# Patient Record
Sex: FEMALE | Race: WHITE | Employment: OTHER | URBAN - METROPOLITAN AREA
[De-identification: names, ages, dates, MRNs, and addresses within clinical notes are randomized per-mention and may not be internally consistent; named-entity substitution may affect disease eponyms.]

---

## 2017-01-01 ENCOUNTER — HOSPITAL ENCOUNTER (INPATIENT)
Age: 82
LOS: 3 days | DRG: 683 | End: 2017-12-02
Attending: EMERGENCY MEDICINE | Admitting: HOSPITALIST
Payer: MEDICARE

## 2017-01-01 ENCOUNTER — APPOINTMENT (OUTPATIENT)
Dept: GENERAL RADIOLOGY | Age: 82
DRG: 603 | End: 2017-01-01
Attending: EMERGENCY MEDICINE
Payer: MEDICARE

## 2017-01-01 ENCOUNTER — HOSPITAL ENCOUNTER (OUTPATIENT)
Dept: PHYSICAL THERAPY | Age: 82
Discharge: HOME | End: 2017-06-20
Payer: MEDICARE

## 2017-01-01 ENCOUNTER — APPOINTMENT (OUTPATIENT)
Dept: CT IMAGING | Age: 82
End: 2017-01-01
Attending: EMERGENCY MEDICINE
Payer: MEDICARE

## 2017-01-01 ENCOUNTER — APPOINTMENT (OUTPATIENT)
Dept: ULTRASOUND IMAGING | Age: 82
DRG: 683 | End: 2017-01-01
Attending: HOSPITALIST
Payer: MEDICARE

## 2017-01-01 ENCOUNTER — HOSPITAL ENCOUNTER (OUTPATIENT)
Dept: NON INVASIVE DIAGNOSTICS | Age: 82
Discharge: HOME | End: 2017-02-08
Payer: MEDICARE

## 2017-01-01 ENCOUNTER — APPOINTMENT (OUTPATIENT)
Dept: PHYSICAL THERAPY | Age: 82
End: 2017-01-01
Payer: MEDICARE

## 2017-01-01 ENCOUNTER — HOSPITAL ENCOUNTER (OUTPATIENT)
Dept: PHYSICAL THERAPY | Age: 82
End: 2017-01-01
Payer: MEDICARE

## 2017-01-01 ENCOUNTER — HOSPITAL ENCOUNTER (OUTPATIENT)
Dept: PHYSICAL THERAPY | Age: 82
Discharge: HOME | End: 2017-06-28
Payer: MEDICARE

## 2017-01-01 ENCOUNTER — HOSPITAL ENCOUNTER (OUTPATIENT)
Dept: PHYSICAL THERAPY | Age: 82
Discharge: HOME | End: 2017-07-27
Payer: MEDICARE

## 2017-01-01 ENCOUNTER — HOSPITAL ENCOUNTER (OUTPATIENT)
Dept: PHYSICAL THERAPY | Age: 82
Discharge: HOME | End: 2017-06-30
Payer: MEDICARE

## 2017-01-01 ENCOUNTER — HOSPITAL ENCOUNTER (OUTPATIENT)
Dept: PHYSICAL THERAPY | Age: 82
Discharge: HOME | End: 2017-07-05
Payer: MEDICARE

## 2017-01-01 ENCOUNTER — APPOINTMENT (OUTPATIENT)
Dept: GENERAL RADIOLOGY | Age: 82
End: 2017-01-01
Attending: EMERGENCY MEDICINE
Payer: MEDICARE

## 2017-01-01 ENCOUNTER — HOSPITAL ENCOUNTER (EMERGENCY)
Age: 82
Discharge: HOME | End: 2017-07-09
Attending: EMERGENCY MEDICINE
Payer: MEDICARE

## 2017-01-01 ENCOUNTER — HOSPITAL ENCOUNTER (OUTPATIENT)
Dept: WOUND CARE | Age: 82
Discharge: HOME | End: 2017-01-04
Payer: MEDICARE

## 2017-01-01 ENCOUNTER — HOSPITAL ENCOUNTER (INPATIENT)
Age: 82
LOS: 2 days | Discharge: HOME | DRG: 603 | End: 2017-05-04
Attending: EMERGENCY MEDICINE | Admitting: HOSPITALIST
Payer: MEDICARE

## 2017-01-01 ENCOUNTER — HOSPITAL ENCOUNTER (OUTPATIENT)
Dept: PHYSICAL THERAPY | Age: 82
Discharge: HOME | End: 2017-06-22
Payer: MEDICARE

## 2017-01-01 ENCOUNTER — HOSPITAL ENCOUNTER (OUTPATIENT)
Dept: WOUND CARE | Age: 82
Discharge: HOME | End: 2017-01-25
Payer: MEDICARE

## 2017-01-01 ENCOUNTER — HOSPITAL ENCOUNTER (OUTPATIENT)
Dept: PHYSICAL THERAPY | Age: 82
Discharge: HOME | End: 2017-05-30
Payer: MEDICARE

## 2017-01-01 ENCOUNTER — HOSPITAL ENCOUNTER (OUTPATIENT)
Dept: PHYSICAL THERAPY | Age: 82
Discharge: HOME | End: 2017-06-14
Payer: MEDICARE

## 2017-01-01 ENCOUNTER — HOSPITAL ENCOUNTER (EMERGENCY)
Age: 82
Discharge: HOME | End: 2017-09-26
Attending: EMERGENCY MEDICINE
Payer: MEDICARE

## 2017-01-01 ENCOUNTER — HOSPITAL ENCOUNTER (OUTPATIENT)
Dept: PHYSICAL THERAPY | Age: 82
Discharge: HOME | End: 2017-06-06
Payer: MEDICARE

## 2017-01-01 ENCOUNTER — HOSPITAL ENCOUNTER (OUTPATIENT)
Dept: PHYSICAL THERAPY | Age: 82
Discharge: HOME | End: 2017-06-12
Payer: MEDICARE

## 2017-01-01 VITALS
OXYGEN SATURATION: 98 % | DIASTOLIC BLOOD PRESSURE: 50 MMHG | BODY MASS INDEX: 28.68 KG/M2 | TEMPERATURE: 98 F | SYSTOLIC BLOOD PRESSURE: 151 MMHG | RESPIRATION RATE: 16 BRPM | HEART RATE: 56 BPM | WEIGHT: 142 LBS

## 2017-01-01 VITALS
HEIGHT: 59 IN | TEMPERATURE: 97.8 F | WEIGHT: 145 LBS | OXYGEN SATURATION: 97 % | BODY MASS INDEX: 29.23 KG/M2 | DIASTOLIC BLOOD PRESSURE: 72 MMHG | SYSTOLIC BLOOD PRESSURE: 144 MMHG | HEART RATE: 62 BPM | RESPIRATION RATE: 16 BRPM

## 2017-01-01 VITALS
DIASTOLIC BLOOD PRESSURE: 73 MMHG | TEMPERATURE: 97.1 F | OXYGEN SATURATION: 100 % | SYSTOLIC BLOOD PRESSURE: 137 MMHG | RESPIRATION RATE: 16 BRPM | HEART RATE: 61 BPM

## 2017-01-01 VITALS
SYSTOLIC BLOOD PRESSURE: 143 MMHG | RESPIRATION RATE: 16 BRPM | HEART RATE: 64 BPM | DIASTOLIC BLOOD PRESSURE: 80 MMHG | TEMPERATURE: 97.4 F | OXYGEN SATURATION: 96 %

## 2017-01-01 VITALS
TEMPERATURE: 99.3 F | DIASTOLIC BLOOD PRESSURE: 76 MMHG | RESPIRATION RATE: 16 BRPM | WEIGHT: 132 LBS | BODY MASS INDEX: 26.61 KG/M2 | OXYGEN SATURATION: 99 % | HEART RATE: 90 BPM | HEIGHT: 59 IN | SYSTOLIC BLOOD PRESSURE: 118 MMHG

## 2017-01-01 VITALS
WEIGHT: 177 LBS | DIASTOLIC BLOOD PRESSURE: 27 MMHG | HEART RATE: 43 BPM | TEMPERATURE: 95.6 F | HEIGHT: 60 IN | OXYGEN SATURATION: 89 % | BODY MASS INDEX: 34.75 KG/M2 | RESPIRATION RATE: 13 BRPM | SYSTOLIC BLOOD PRESSURE: 54 MMHG

## 2017-01-01 DIAGNOSIS — R31.9 URINARY TRACT INFECTION WITH HEMATURIA, SITE UNSPECIFIED: ICD-10-CM

## 2017-01-01 DIAGNOSIS — L03.115 CELLULITIS OF RIGHT LEG: Primary | ICD-10-CM

## 2017-01-01 DIAGNOSIS — N39.0 URINARY TRACT INFECTION WITH HEMATURIA, SITE UNSPECIFIED: ICD-10-CM

## 2017-01-01 DIAGNOSIS — J90 PLEURAL EFFUSION: ICD-10-CM

## 2017-01-01 DIAGNOSIS — R19.7 DIARRHEA, UNSPECIFIED TYPE: ICD-10-CM

## 2017-01-01 DIAGNOSIS — R60.0 LEG EDEMA: ICD-10-CM

## 2017-01-01 DIAGNOSIS — N39.0 URINARY TRACT INFECTION WITHOUT HEMATURIA, SITE UNSPECIFIED: Primary | ICD-10-CM

## 2017-01-01 DIAGNOSIS — I87.2 VENOUS INSUFFICIENCY: ICD-10-CM

## 2017-01-01 DIAGNOSIS — E86.0 DEHYDRATION: ICD-10-CM

## 2017-01-01 DIAGNOSIS — D68.9 COAGULOPATHY (HCC): ICD-10-CM

## 2017-01-01 DIAGNOSIS — I73.9 PERIPHERAL VASCULAR DISEASE (HCC): ICD-10-CM

## 2017-01-01 DIAGNOSIS — L03.114 CELLULITIS OF LEFT HAND: Primary | ICD-10-CM

## 2017-01-01 DIAGNOSIS — N17.9 AKI (ACUTE KIDNEY INJURY) (HCC): Primary | ICD-10-CM

## 2017-01-01 DIAGNOSIS — M25.461 KNEE EFFUSION, RIGHT: ICD-10-CM

## 2017-01-01 LAB
ALBUMIN SERPL-MCNC: 3.1 G/DL (ref 3.4–5)
ALBUMIN SERPL-MCNC: 3.1 G/DL (ref 3.4–5)
ALBUMIN SERPL-MCNC: 3.2 G/DL (ref 3.4–5)
ALBUMIN SERPL-MCNC: 3.5 G/DL (ref 3.4–5)
ALBUMIN/GLOB SERPL: 1.2 {RATIO} (ref 0.8–1.7)
ALBUMIN/GLOB SERPL: 1.3 {RATIO} (ref 0.8–1.7)
ALP SERPL-CCNC: 172 U/L (ref 45–117)
ALP SERPL-CCNC: 172 U/L (ref 45–117)
ALT SERPL-CCNC: 25 U/L (ref 13–56)
ALT SERPL-CCNC: 29 U/L (ref 13–56)
ANION GAP BLD CALC-SCNC: 11 MMOL/L (ref 3–18)
ANION GAP BLD CALC-SCNC: 7 MMOL/L (ref 3–18)
ANION GAP BLD CALC-SCNC: 8 MMOL/L (ref 3–18)
ANION GAP BLD CALC-SCNC: 9 MMOL/L (ref 3–18)
ANION GAP BLD CALC-SCNC: 9 MMOL/L (ref 3–18)
ANION GAP SERPL CALC-SCNC: 10 MMOL/L (ref 3–18)
ANION GAP SERPL CALC-SCNC: 11 MMOL/L (ref 3–18)
ANION GAP SERPL CALC-SCNC: 13 MMOL/L (ref 3–18)
ANION GAP SERPL CALC-SCNC: 14 MMOL/L (ref 3–18)
ANION GAP SERPL CALC-SCNC: 15 MMOL/L (ref 3–18)
APPEARANCE FLD: ABNORMAL
APPEARANCE UR: ABNORMAL
APPEARANCE UR: CLEAR
APTT PPP: 60.5 SEC (ref 23–36.4)
AST SERPL-CCNC: 36 U/L (ref 15–37)
AST SERPL-CCNC: 46 U/L (ref 15–37)
ATRIAL RATE: 15 BPM
ATRIAL RATE: 227 BPM
ATRIAL RATE: 48 BPM
BACTERIA SPEC CULT: ABNORMAL
BACTERIA SPEC CULT: ABNORMAL
BACTERIA SPEC CULT: NORMAL
BACTERIA URNS QL MICRO: ABNORMAL /HPF
BACTERIA URNS QL MICRO: ABNORMAL /HPF
BASOPHILS # BLD AUTO: 0 K/UL (ref 0–0.06)
BASOPHILS # BLD: 0 % (ref 0–2)
BASOPHILS # BLD: 0 K/UL (ref 0–0.06)
BASOPHILS # BLD: 0 K/UL (ref 0–0.06)
BASOPHILS # BLD: 1 % (ref 0–2)
BASOPHILS NFR BLD: 0 % (ref 0–2)
BASOPHILS NFR BLD: 1 % (ref 0–2)
BILIRUB DIRECT SERPL-MCNC: 0.2 MG/DL (ref 0–0.2)
BILIRUB SERPL-MCNC: 0.3 MG/DL (ref 0.2–1)
BILIRUB SERPL-MCNC: 0.9 MG/DL (ref 0.2–1)
BILIRUB UR QL: NEGATIVE
BILIRUB UR QL: NEGATIVE
BUN SERPL-MCNC: 122 MG/DL (ref 7–18)
BUN SERPL-MCNC: 122 MG/DL (ref 7–18)
BUN SERPL-MCNC: 129 MG/DL (ref 7–18)
BUN SERPL-MCNC: 132 MG/DL (ref 7–18)
BUN SERPL-MCNC: 27 MG/DL (ref 7–18)
BUN SERPL-MCNC: 48 MG/DL (ref 7–18)
BUN SERPL-MCNC: 49 MG/DL (ref 7–18)
BUN SERPL-MCNC: 53 MG/DL (ref 7–18)
BUN SERPL-MCNC: 55 MG/DL (ref 7–18)
BUN SERPL-MCNC: 66 MG/DL (ref 7–18)
BUN/CREAT SERPL: 19 (ref 12–20)
BUN/CREAT SERPL: 30 (ref 12–20)
BUN/CREAT SERPL: 30 (ref 12–20)
BUN/CREAT SERPL: 31 (ref 12–20)
BUN/CREAT SERPL: 31 (ref 12–20)
BUN/CREAT SERPL: 33 (ref 12–20)
BUN/CREAT SERPL: 34 (ref 12–20)
BUN/CREAT SERPL: 38 (ref 12–20)
BUN/CREAT SERPL: 38 (ref 12–20)
BUN/CREAT SERPL: 42 (ref 12–20)
CALCIUM SERPL-MCNC: 8.6 MG/DL (ref 8.5–10.1)
CALCIUM SERPL-MCNC: 8.6 MG/DL (ref 8.5–10.1)
CALCIUM SERPL-MCNC: 8.7 MG/DL (ref 8.5–10.1)
CALCIUM SERPL-MCNC: 8.8 MG/DL (ref 8.5–10.1)
CALCIUM SERPL-MCNC: 8.9 MG/DL (ref 8.5–10.1)
CALCIUM SERPL-MCNC: 9.1 MG/DL (ref 8.5–10.1)
CALCIUM SERPL-MCNC: 9.5 MG/DL (ref 8.5–10.1)
CALCIUM SERPL-MCNC: 9.6 MG/DL (ref 8.5–10.1)
CALCULATED R AXIS, ECG10: -20 DEGREES
CALCULATED R AXIS, ECG10: -37 DEGREES
CALCULATED R AXIS, ECG10: -38 DEGREES
CALCULATED T AXIS, ECG11: -33 DEGREES
CALCULATED T AXIS, ECG11: 1 DEGREES
CALCULATED T AXIS, ECG11: 26 DEGREES
CHLORIDE SERPL-SCNC: 102 MMOL/L (ref 100–108)
CHLORIDE SERPL-SCNC: 103 MMOL/L (ref 100–108)
CHLORIDE SERPL-SCNC: 104 MMOL/L (ref 100–108)
CHLORIDE SERPL-SCNC: 105 MMOL/L (ref 100–108)
CHLORIDE SERPL-SCNC: 106 MMOL/L (ref 100–108)
CHLORIDE SERPL-SCNC: 107 MMOL/L (ref 100–108)
CK MB CFR SERPL CALC: 5.9 % (ref 0–4)
CK MB CFR SERPL CALC: ABNORMAL % (ref 0–4)
CK MB SERPL-MCNC: 3.9 NG/ML (ref 5–25)
CK MB SERPL-MCNC: <1 NG/ML (ref 5–25)
CK SERPL-CCNC: 24 U/L (ref 26–192)
CK SERPL-CCNC: 66 U/L (ref 26–192)
CO2 SERPL-SCNC: 16 MMOL/L (ref 21–32)
CO2 SERPL-SCNC: 17 MMOL/L (ref 21–32)
CO2 SERPL-SCNC: 20 MMOL/L (ref 21–32)
CO2 SERPL-SCNC: 21 MMOL/L (ref 21–32)
CO2 SERPL-SCNC: 24 MMOL/L (ref 21–32)
CO2 SERPL-SCNC: 25 MMOL/L (ref 21–32)
CO2 SERPL-SCNC: 25 MMOL/L (ref 21–32)
CO2 SERPL-SCNC: 26 MMOL/L (ref 21–32)
CO2 SERPL-SCNC: 26 MMOL/L (ref 21–32)
CO2 SERPL-SCNC: 28 MMOL/L (ref 21–32)
COLOR FLD: ABNORMAL
COLOR UR: YELLOW
COLOR UR: YELLOW
CREAT SERPL-MCNC: 1.16 MG/DL (ref 0.6–1.3)
CREAT SERPL-MCNC: 1.26 MG/DL (ref 0.6–1.3)
CREAT SERPL-MCNC: 1.4 MG/DL (ref 0.6–1.3)
CREAT SERPL-MCNC: 1.45 MG/DL (ref 0.6–1.3)
CREAT SERPL-MCNC: 1.76 MG/DL (ref 0.6–1.3)
CREAT SERPL-MCNC: 1.92 MG/DL (ref 0.6–1.3)
CREAT SERPL-MCNC: 4.02 MG/DL (ref 0.6–1.3)
CREAT SERPL-MCNC: 4.02 MG/DL (ref 0.6–1.3)
CREAT SERPL-MCNC: 4.11 MG/DL (ref 0.6–1.3)
CREAT SERPL-MCNC: 4.16 MG/DL (ref 0.6–1.3)
CREAT UR-MCNC: 101 MG/DL (ref 30–125)
CREAT UR-MCNC: 101 MG/DL (ref 30–125)
DIAGNOSIS, 93000: NORMAL
DIFFERENTIAL METHOD BLD: ABNORMAL
DIGOXIN SERPL-MCNC: 0.5 NG/ML (ref 0.9–2)
EOSINOPHIL # BLD: 0.1 K/UL (ref 0–0.4)
EOSINOPHIL # BLD: 0.1 K/UL (ref 0–0.4)
EOSINOPHIL # BLD: 0.2 K/UL (ref 0–0.4)
EOSINOPHIL # BLD: 0.2 K/UL (ref 0–0.4)
EOSINOPHIL # BLD: 0.4 K/UL (ref 0–0.4)
EOSINOPHIL # BLD: 0.5 K/UL (ref 0–0.4)
EOSINOPHIL # FLD: 0 %
EOSINOPHIL NFR BLD: 1 % (ref 0–5)
EOSINOPHIL NFR BLD: 1 % (ref 0–5)
EOSINOPHIL NFR BLD: 13 % (ref 0–5)
EOSINOPHIL NFR BLD: 14 % (ref 0–5)
EOSINOPHIL NFR BLD: 2 % (ref 0–5)
EOSINOPHIL NFR BLD: 2 % (ref 0–5)
EPITH CASTS URNS QL MICRO: ABNORMAL /LPF (ref 0–5)
EPITH CASTS URNS QL MICRO: ABNORMAL /LPF (ref 0–5)
ERYTHROCYTE [DISTWIDTH] IN BLOOD BY AUTOMATED COUNT: 14.8 % (ref 11.6–14.5)
ERYTHROCYTE [DISTWIDTH] IN BLOOD BY AUTOMATED COUNT: 16 % (ref 11.6–14.5)
ERYTHROCYTE [DISTWIDTH] IN BLOOD BY AUTOMATED COUNT: 16.5 % (ref 11.6–14.5)
ERYTHROCYTE [DISTWIDTH] IN BLOOD BY AUTOMATED COUNT: 16.7 % (ref 11.6–14.5)
ERYTHROCYTE [DISTWIDTH] IN BLOOD BY AUTOMATED COUNT: 16.7 % (ref 11.6–14.5)
ERYTHROCYTE [DISTWIDTH] IN BLOOD BY AUTOMATED COUNT: 19.9 % (ref 11.6–14.5)
ERYTHROCYTE [DISTWIDTH] IN BLOOD BY AUTOMATED COUNT: 20.5 % (ref 11.6–14.5)
GLOBULIN SER CALC-MCNC: 2.6 G/DL (ref 2–4)
GLOBULIN SER CALC-MCNC: 2.7 G/DL (ref 2–4)
GLUCOSE SERPL-MCNC: 106 MG/DL (ref 74–99)
GLUCOSE SERPL-MCNC: 107 MG/DL (ref 74–99)
GLUCOSE SERPL-MCNC: 109 MG/DL (ref 74–99)
GLUCOSE SERPL-MCNC: 116 MG/DL (ref 74–99)
GLUCOSE SERPL-MCNC: 123 MG/DL (ref 74–99)
GLUCOSE SERPL-MCNC: 67 MG/DL (ref 74–99)
GLUCOSE SERPL-MCNC: 68 MG/DL (ref 74–99)
GLUCOSE SERPL-MCNC: 95 MG/DL (ref 74–99)
GLUCOSE SERPL-MCNC: 95 MG/DL (ref 74–99)
GLUCOSE SERPL-MCNC: 98 MG/DL (ref 74–99)
GLUCOSE UR STRIP.AUTO-MCNC: NEGATIVE MG/DL
GLUCOSE UR STRIP.AUTO-MCNC: NEGATIVE MG/DL
GRAM STN SPEC: NORMAL
GRAM STN SPEC: NORMAL
HCT VFR BLD AUTO: 30 % (ref 35–45)
HCT VFR BLD AUTO: 31.7 % (ref 35–45)
HCT VFR BLD AUTO: 33.4 % (ref 35–45)
HCT VFR BLD AUTO: 34 % (ref 35–45)
HCT VFR BLD AUTO: 34.3 % (ref 35–45)
HCT VFR BLD AUTO: 35 % (ref 35–45)
HCT VFR BLD AUTO: 36.8 % (ref 35–45)
HGB BLD-MCNC: 10.6 G/DL (ref 12–16)
HGB BLD-MCNC: 11.1 G/DL (ref 12–16)
HGB BLD-MCNC: 11.4 G/DL (ref 12–16)
HGB BLD-MCNC: 11.6 G/DL (ref 12–16)
HGB BLD-MCNC: 11.6 G/DL (ref 12–16)
HGB BLD-MCNC: 12.3 G/DL (ref 12–16)
HGB BLD-MCNC: 9.9 G/DL (ref 12–16)
HGB UR QL STRIP: ABNORMAL
HGB UR QL STRIP: ABNORMAL
INR PPP: 2.9 (ref 0.8–1.2)
INR PPP: 4.1 (ref 0.8–1.2)
KETONES UR QL STRIP.AUTO: NEGATIVE MG/DL
KETONES UR QL STRIP.AUTO: NEGATIVE MG/DL
LACTATE BLD-SCNC: 1.2 MMOL/L (ref 0.4–2)
LACTATE BLD-SCNC: 1.4 MMOL/L (ref 0.4–2)
LEUKOCYTE ESTERASE UR QL STRIP.AUTO: ABNORMAL
LEUKOCYTE ESTERASE UR QL STRIP.AUTO: ABNORMAL
LYMPHOCYTES # BLD AUTO: 11 % (ref 21–52)
LYMPHOCYTES # BLD AUTO: 3 % (ref 21–52)
LYMPHOCYTES # BLD AUTO: 4 % (ref 21–52)
LYMPHOCYTES # BLD AUTO: 5 % (ref 21–52)
LYMPHOCYTES # BLD: 0.4 K/UL (ref 0.9–3.6)
LYMPHOCYTES # BLD: 0.4 K/UL (ref 0.9–3.6)
LYMPHOCYTES # BLD: 0.5 K/UL (ref 0.9–3.6)
LYMPHOCYTES # BLD: 0.6 K/UL (ref 0.9–3.6)
LYMPHOCYTES NFR BLD: 12 % (ref 21–52)
LYMPHOCYTES NFR BLD: 15 % (ref 21–52)
LYMPHOCYTES NFR FLD: 50 %
MAGNESIUM SERPL-MCNC: 2.4 MG/DL (ref 1.6–2.6)
MCH RBC QN AUTO: 30.3 PG (ref 24–34)
MCH RBC QN AUTO: 30.4 PG (ref 24–34)
MCH RBC QN AUTO: 30.5 PG (ref 24–34)
MCH RBC QN AUTO: 30.6 PG (ref 24–34)
MCH RBC QN AUTO: 30.8 PG (ref 24–34)
MCH RBC QN AUTO: 30.8 PG (ref 24–34)
MCH RBC QN AUTO: 32 PG (ref 24–34)
MCHC RBC AUTO-ENTMCNC: 33 G/DL (ref 31–37)
MCHC RBC AUTO-ENTMCNC: 33.1 G/DL (ref 31–37)
MCHC RBC AUTO-ENTMCNC: 33.2 G/DL (ref 31–37)
MCHC RBC AUTO-ENTMCNC: 33.4 G/DL (ref 31–37)
MCHC RBC AUTO-ENTMCNC: 33.4 G/DL (ref 31–37)
MCHC RBC AUTO-ENTMCNC: 33.5 G/DL (ref 31–37)
MCHC RBC AUTO-ENTMCNC: 33.8 G/DL (ref 31–37)
MCV RBC AUTO: 90.5 FL (ref 74–97)
MCV RBC AUTO: 90.9 FL (ref 74–97)
MCV RBC AUTO: 91.6 FL (ref 74–97)
MCV RBC AUTO: 91.7 FL (ref 74–97)
MCV RBC AUTO: 92 FL (ref 74–97)
MCV RBC AUTO: 92.2 FL (ref 74–97)
MCV RBC AUTO: 96.3 FL (ref 74–97)
MONOCYTES # BLD: 0.2 K/UL (ref 0.05–1.2)
MONOCYTES # BLD: 0.3 K/UL (ref 0.05–1.2)
MONOCYTES # BLD: 0.4 K/UL (ref 0.05–1.2)
MONOCYTES # BLD: 0.9 K/UL (ref 0.05–1.2)
MONOCYTES NFR BLD AUTO: 16 % (ref 3–10)
MONOCYTES NFR BLD AUTO: 2 % (ref 3–10)
MONOCYTES NFR BLD AUTO: 2 % (ref 3–10)
MONOCYTES NFR BLD AUTO: 3 % (ref 3–10)
MONOCYTES NFR BLD: 11 % (ref 3–10)
MONOCYTES NFR BLD: 7 % (ref 3–10)
MONOCYTES NFR FLD: 10 %
NEUTS BAND # FLD: 10 %
NEUTS SEG # BLD: 1.9 K/UL (ref 1.8–8)
NEUTS SEG # BLD: 12.1 K/UL (ref 1.8–8)
NEUTS SEG # BLD: 2 K/UL (ref 1.8–8)
NEUTS SEG # BLD: 4.1 K/UL (ref 1.8–8)
NEUTS SEG # BLD: 9 K/UL (ref 1.8–8)
NEUTS SEG # BLD: 9.8 K/UL (ref 1.8–8)
NEUTS SEG NFR BLD AUTO: 70 % (ref 40–73)
NEUTS SEG NFR BLD AUTO: 90 % (ref 40–73)
NEUTS SEG NFR BLD AUTO: 93 % (ref 40–73)
NEUTS SEG NFR BLD AUTO: 94 % (ref 40–73)
NEUTS SEG NFR BLD: 59 % (ref 40–73)
NEUTS SEG NFR BLD: 68 % (ref 40–73)
NEUTS SEG NFR FLD: 30 %
NITRITE UR QL STRIP.AUTO: NEGATIVE
NITRITE UR QL STRIP.AUTO: NEGATIVE
NUC CELL # FLD: 3950 /CU MM
PH UR STRIP: 5 [PH] (ref 5–8)
PH UR STRIP: 5 [PH] (ref 5–8)
PHOSPHATE SERPL-MCNC: 8.3 MG/DL (ref 2.5–4.9)
PHOSPHATE SERPL-MCNC: 8.4 MG/DL (ref 2.5–4.9)
PLATELET # BLD AUTO: 102 K/UL (ref 135–420)
PLATELET # BLD AUTO: 119 K/UL (ref 135–420)
PLATELET # BLD AUTO: 121 K/UL (ref 135–420)
PLATELET # BLD AUTO: 135 K/UL (ref 135–420)
PLATELET # BLD AUTO: 72 K/UL (ref 135–420)
PLATELET # BLD AUTO: 95 K/UL (ref 135–420)
PLATELET # BLD AUTO: 98 K/UL (ref 135–420)
PMV BLD AUTO: 11.3 FL (ref 9.2–11.8)
PMV BLD AUTO: 11.3 FL (ref 9.2–11.8)
PMV BLD AUTO: 11.4 FL (ref 9.2–11.8)
PMV BLD AUTO: 11.4 FL (ref 9.2–11.8)
PMV BLD AUTO: 12.9 FL (ref 9.2–11.8)
PMV BLD AUTO: 13.4 FL (ref 9.2–11.8)
POTASSIUM SERPL-SCNC: 3.9 MMOL/L (ref 3.5–5.5)
POTASSIUM SERPL-SCNC: 4.1 MMOL/L (ref 3.5–5.5)
POTASSIUM SERPL-SCNC: 4.2 MMOL/L (ref 3.5–5.5)
POTASSIUM SERPL-SCNC: 4.3 MMOL/L (ref 3.5–5.5)
POTASSIUM SERPL-SCNC: 4.5 MMOL/L (ref 3.5–5.5)
POTASSIUM SERPL-SCNC: 4.6 MMOL/L (ref 3.5–5.5)
POTASSIUM SERPL-SCNC: 4.6 MMOL/L (ref 3.5–5.5)
POTASSIUM SERPL-SCNC: 5.1 MMOL/L (ref 3.5–5.5)
PROT SERPL-MCNC: 5.9 G/DL (ref 6.4–8.2)
PROT SERPL-MCNC: 6.1 G/DL (ref 6.4–8.2)
PROT UR STRIP-MCNC: 100 MG/DL
PROT UR STRIP-MCNC: NEGATIVE MG/DL
PROT UR-MCNC: 108 MG/DL
PROT/CREAT UR-RTO: 1.1
PROTHROMBIN TIME: 28.7 SEC (ref 11.5–15.2)
PROTHROMBIN TIME: 37.2 SEC (ref 11.5–15.2)
Q-T INTERVAL, ECG07: 432 MS
Q-T INTERVAL, ECG07: 490 MS
Q-T INTERVAL, ECG07: 642 MS
QRS DURATION, ECG06: 100 MS
QRS DURATION, ECG06: 118 MS
QRS DURATION, ECG06: 124 MS
QTC CALCULATION (BEZET), ECG08: 401 MS
QTC CALCULATION (BEZET), ECG08: 455 MS
QTC CALCULATION (BEZET), ECG08: 496 MS
RBC # BLD AUTO: 3.27 M/UL (ref 4.2–5.3)
RBC # BLD AUTO: 3.44 M/UL (ref 4.2–5.3)
RBC # BLD AUTO: 3.47 M/UL (ref 4.2–5.3)
RBC # BLD AUTO: 3.74 M/UL (ref 4.2–5.3)
RBC # BLD AUTO: 3.79 M/UL (ref 4.2–5.3)
RBC # BLD AUTO: 3.82 M/UL (ref 4.2–5.3)
RBC # BLD AUTO: 4 M/UL (ref 4.2–5.3)
RBC # FLD: ABNORMAL /CU MM
RBC #/AREA URNS HPF: 0 /HPF (ref 0–5)
RBC #/AREA URNS HPF: ABNORMAL /HPF (ref 0–5)
SERVICE CMNT-IMP: ABNORMAL
SERVICE CMNT-IMP: NORMAL
SODIUM SERPL-SCNC: 135 MMOL/L (ref 136–145)
SODIUM SERPL-SCNC: 137 MMOL/L (ref 136–145)
SODIUM SERPL-SCNC: 137 MMOL/L (ref 136–145)
SODIUM SERPL-SCNC: 138 MMOL/L (ref 136–145)
SODIUM SERPL-SCNC: 138 MMOL/L (ref 136–145)
SODIUM SERPL-SCNC: 139 MMOL/L (ref 136–145)
SODIUM SERPL-SCNC: 140 MMOL/L (ref 136–145)
SODIUM SERPL-SCNC: 141 MMOL/L (ref 136–145)
SODIUM SERPL-SCNC: 141 MMOL/L (ref 136–145)
SODIUM SERPL-SCNC: 142 MMOL/L (ref 136–145)
SODIUM UR-SCNC: 25 MMOL/L (ref 20–110)
SP GR UR REFRACTOMETRY: 1.02 (ref 1–1.03)
SP GR UR REFRACTOMETRY: 1.02 (ref 1–1.03)
SPECIMEN SOURCE FLD: ABNORMAL
T4 FREE SERPL-MCNC: 1 NG/DL (ref 0.7–1.5)
TROPONIN I SERPL-MCNC: 0.03 NG/ML (ref 0–0.04)
TROPONIN I SERPL-MCNC: 0.06 NG/ML (ref 0–0.04)
TROPONIN I SERPL-MCNC: 0.07 NG/ML (ref 0–0.04)
TSH SERPL DL<=0.05 MIU/L-ACNC: 9.98 UIU/ML (ref 0.36–3.74)
UROBILINOGEN UR QL STRIP.AUTO: 0.2 EU/DL (ref 0.2–1)
UROBILINOGEN UR QL STRIP.AUTO: 0.2 EU/DL (ref 0.2–1)
VENTRICULAR RATE, ECG03: 36 BPM
VENTRICULAR RATE, ECG03: 52 BPM
VENTRICULAR RATE, ECG03: 52 BPM
WBC # BLD AUTO: 10.6 K/UL (ref 4.6–13.2)
WBC # BLD AUTO: 12.9 K/UL (ref 4.6–13.2)
WBC # BLD AUTO: 2.9 K/UL (ref 4.6–13.2)
WBC # BLD AUTO: 3.3 K/UL (ref 4.6–13.2)
WBC # BLD AUTO: 5.3 K/UL (ref 4.6–13.2)
WBC # BLD AUTO: 5.8 K/UL (ref 4.6–13.2)
WBC # BLD AUTO: 9.9 K/UL (ref 4.6–13.2)
WBC URNS QL MICRO: ABNORMAL /HPF (ref 0–4)
WBC URNS QL MICRO: ABNORMAL /HPF (ref 0–4)

## 2017-01-01 PROCEDURE — 74011250637 HC RX REV CODE- 250/637: Performed by: HOSPITALIST

## 2017-01-01 PROCEDURE — 87086 URINE CULTURE/COLONY COUNT: CPT | Performed by: EMERGENCY MEDICINE

## 2017-01-01 PROCEDURE — 74011000258 HC RX REV CODE- 258: Performed by: INTERNAL MEDICINE

## 2017-01-01 PROCEDURE — 74011000258 HC RX REV CODE- 258: Performed by: EMERGENCY MEDICINE

## 2017-01-01 PROCEDURE — 74011000258 HC RX REV CODE- 258: Performed by: HOSPITALIST

## 2017-01-01 PROCEDURE — 74011000250 HC RX REV CODE- 250: Performed by: INTERNAL MEDICINE

## 2017-01-01 PROCEDURE — 74011250636 HC RX REV CODE- 250/636

## 2017-01-01 PROCEDURE — 99284 EMERGENCY DEPT VISIT MOD MDM: CPT

## 2017-01-01 PROCEDURE — 85610 PROTHROMBIN TIME: CPT | Performed by: HOSPITALIST

## 2017-01-01 PROCEDURE — 84484 ASSAY OF TROPONIN QUANT: CPT | Performed by: EMERGENCY MEDICINE

## 2017-01-01 PROCEDURE — 65610000006 HC RM INTENSIVE CARE

## 2017-01-01 PROCEDURE — 97530 THERAPEUTIC ACTIVITIES: CPT

## 2017-01-01 PROCEDURE — 74011250636 HC RX REV CODE- 250/636: Performed by: EMERGENCY MEDICINE

## 2017-01-01 PROCEDURE — G8980 MOBILITY D/C STATUS: HCPCS

## 2017-01-01 PROCEDURE — 87077 CULTURE AEROBIC IDENTIFY: CPT | Performed by: EMERGENCY MEDICINE

## 2017-01-01 PROCEDURE — 97110 THERAPEUTIC EXERCISES: CPT

## 2017-01-01 PROCEDURE — 80069 RENAL FUNCTION PANEL: CPT | Performed by: INTERNAL MEDICINE

## 2017-01-01 PROCEDURE — 74011250636 HC RX REV CODE- 250/636: Performed by: INTERNAL MEDICINE

## 2017-01-01 PROCEDURE — 65270000029 HC RM PRIVATE

## 2017-01-01 PROCEDURE — 36415 COLL VENOUS BLD VENIPUNCTURE: CPT | Performed by: INTERNAL MEDICINE

## 2017-01-01 PROCEDURE — 80048 BASIC METABOLIC PNL TOTAL CA: CPT | Performed by: HOSPITALIST

## 2017-01-01 PROCEDURE — 80048 BASIC METABOLIC PNL TOTAL CA: CPT | Performed by: EMERGENCY MEDICINE

## 2017-01-01 PROCEDURE — 83735 ASSAY OF MAGNESIUM: CPT | Performed by: EMERGENCY MEDICINE

## 2017-01-01 PROCEDURE — 89051 BODY FLUID CELL COUNT: CPT | Performed by: EMERGENCY MEDICINE

## 2017-01-01 PROCEDURE — 74011250636 HC RX REV CODE- 250/636: Performed by: HOSPITALIST

## 2017-01-01 PROCEDURE — 80053 COMPREHEN METABOLIC PANEL: CPT | Performed by: EMERGENCY MEDICINE

## 2017-01-01 PROCEDURE — 74011000250 HC RX REV CODE- 250: Performed by: EMERGENCY MEDICINE

## 2017-01-01 PROCEDURE — 51798 US URINE CAPACITY MEASURE: CPT

## 2017-01-01 PROCEDURE — 93005 ELECTROCARDIOGRAM TRACING: CPT

## 2017-01-01 PROCEDURE — 73560 X-RAY EXAM OF KNEE 1 OR 2: CPT

## 2017-01-01 PROCEDURE — 81001 URINALYSIS AUTO W/SCOPE: CPT | Performed by: EMERGENCY MEDICINE

## 2017-01-01 PROCEDURE — 93306 TTE W/DOPPLER COMPLETE: CPT

## 2017-01-01 PROCEDURE — G8979 MOBILITY GOAL STATUS: HCPCS

## 2017-01-01 PROCEDURE — G8978 MOBILITY CURRENT STATUS: HCPCS

## 2017-01-01 PROCEDURE — 97162 PT EVAL MOD COMPLEX 30 MIN: CPT

## 2017-01-01 PROCEDURE — 83605 ASSAY OF LACTIC ACID: CPT

## 2017-01-01 PROCEDURE — 85610 PROTHROMBIN TIME: CPT | Performed by: EMERGENCY MEDICINE

## 2017-01-01 PROCEDURE — 74011000250 HC RX REV CODE- 250: Performed by: HOSPITALIST

## 2017-01-01 PROCEDURE — 99218 HC RM OBSERVATION: CPT

## 2017-01-01 PROCEDURE — 70450 CT HEAD/BRAIN W/O DYE: CPT

## 2017-01-01 PROCEDURE — 77030020256 HC SOL INJ NACL 0.9%  500ML

## 2017-01-01 PROCEDURE — 77030019938 HC TBNG IV PCA ICUM -A

## 2017-01-01 PROCEDURE — 85025 COMPLETE CBC W/AUTO DIFF WBC: CPT | Performed by: EMERGENCY MEDICINE

## 2017-01-01 PROCEDURE — 85027 COMPLETE CBC AUTOMATED: CPT | Performed by: EMERGENCY MEDICINE

## 2017-01-01 PROCEDURE — 36415 COLL VENOUS BLD VENIPUNCTURE: CPT | Performed by: HOSPITALIST

## 2017-01-01 PROCEDURE — 93308 TTE F-UP OR LMTD: CPT

## 2017-01-01 PROCEDURE — 82570 ASSAY OF URINE CREATININE: CPT | Performed by: HOSPITALIST

## 2017-01-01 PROCEDURE — 93971 EXTREMITY STUDY: CPT

## 2017-01-01 PROCEDURE — 85025 COMPLETE CBC W/AUTO DIFF WBC: CPT | Performed by: INTERNAL MEDICINE

## 2017-01-01 PROCEDURE — 87040 BLOOD CULTURE FOR BACTERIA: CPT | Performed by: EMERGENCY MEDICINE

## 2017-01-01 PROCEDURE — 96361 HYDRATE IV INFUSION ADD-ON: CPT

## 2017-01-01 PROCEDURE — 85730 THROMBOPLASTIN TIME PARTIAL: CPT | Performed by: EMERGENCY MEDICINE

## 2017-01-01 PROCEDURE — 74011250636 HC RX REV CODE- 250/636: Performed by: PHYSICIAN ASSISTANT

## 2017-01-01 PROCEDURE — 97166 OT EVAL MOD COMPLEX 45 MIN: CPT

## 2017-01-01 PROCEDURE — 87070 CULTURE OTHR SPECIMN AEROBIC: CPT | Performed by: EMERGENCY MEDICINE

## 2017-01-01 PROCEDURE — 96365 THER/PROPH/DIAG IV INF INIT: CPT

## 2017-01-01 PROCEDURE — 74011250637 HC RX REV CODE- 250/637: Performed by: EMERGENCY MEDICINE

## 2017-01-01 PROCEDURE — 71010 XR CHEST PORT: CPT

## 2017-01-01 PROCEDURE — 85025 COMPLETE CBC W/AUTO DIFF WBC: CPT | Performed by: HOSPITALIST

## 2017-01-01 PROCEDURE — 77030011256 HC DRSG MEPILEX <16IN NO BORD MOLN -A

## 2017-01-01 PROCEDURE — 77030034849

## 2017-01-01 PROCEDURE — 80076 HEPATIC FUNCTION PANEL: CPT | Performed by: HOSPITALIST

## 2017-01-01 PROCEDURE — 99211 OFF/OP EST MAY X REQ PHY/QHP: CPT

## 2017-01-01 PROCEDURE — 80162 ASSAY OF DIGOXIN TOTAL: CPT | Performed by: INTERNAL MEDICINE

## 2017-01-01 PROCEDURE — 84439 ASSAY OF FREE THYROXINE: CPT | Performed by: HOSPITALIST

## 2017-01-01 PROCEDURE — 84300 ASSAY OF URINE SODIUM: CPT | Performed by: INTERNAL MEDICINE

## 2017-01-01 PROCEDURE — 84156 ASSAY OF PROTEIN URINE: CPT | Performed by: INTERNAL MEDICINE

## 2017-01-01 PROCEDURE — 99283 EMERGENCY DEPT VISIT LOW MDM: CPT

## 2017-01-01 PROCEDURE — 96374 THER/PROPH/DIAG INJ IV PUSH: CPT

## 2017-01-01 PROCEDURE — 84443 ASSAY THYROID STIM HORMONE: CPT | Performed by: EMERGENCY MEDICINE

## 2017-01-01 PROCEDURE — 99285 EMERGENCY DEPT VISIT HI MDM: CPT

## 2017-01-01 PROCEDURE — 82784 ASSAY IGA/IGD/IGG/IGM EACH: CPT | Performed by: HOSPITALIST

## 2017-01-01 PROCEDURE — 96360 HYDRATION IV INFUSION INIT: CPT

## 2017-01-01 PROCEDURE — 74011000258 HC RX REV CODE- 258

## 2017-01-01 PROCEDURE — 77030020186 HC BOOT HL PROTCT SAGE -B

## 2017-01-01 PROCEDURE — 97163 PT EVAL HIGH COMPLEX 45 MIN: CPT

## 2017-01-01 PROCEDURE — 87186 SC STD MICRODIL/AGAR DIL: CPT | Performed by: EMERGENCY MEDICINE

## 2017-01-01 PROCEDURE — 97116 GAIT TRAINING THERAPY: CPT

## 2017-01-01 PROCEDURE — 77030020821 HC BNDG COMPR KT S&N -A: Performed by: INTERNAL MEDICINE

## 2017-01-01 PROCEDURE — 77030020245 HC SOL INJ 5% D/0.9%NACL

## 2017-01-01 PROCEDURE — 97535 SELF CARE MNGMENT TRAINING: CPT

## 2017-01-01 PROCEDURE — 76775 US EXAM ABDO BACK WALL LIM: CPT

## 2017-01-01 PROCEDURE — 96367 TX/PROPH/DG ADDL SEQ IV INF: CPT

## 2017-01-01 PROCEDURE — 29580 STRAPPING UNNA BOOT: CPT

## 2017-01-01 PROCEDURE — 82550 ASSAY OF CK (CPK): CPT | Performed by: EMERGENCY MEDICINE

## 2017-01-01 PROCEDURE — 96375 TX/PRO/DX INJ NEW DRUG ADDON: CPT

## 2017-01-01 RX ORDER — NITROFURANTOIN 25; 75 MG/1; MG/1
100 CAPSULE ORAL 2 TIMES DAILY
Qty: 6 CAP | Refills: 0 | Status: SHIPPED | OUTPATIENT
Start: 2017-01-01 | End: 2017-01-01

## 2017-01-01 RX ORDER — METOPROLOL TARTRATE 50 MG/1
100 TABLET ORAL 2 TIMES DAILY
Status: DISCONTINUED | OUTPATIENT
Start: 2017-01-01 | End: 2017-01-01 | Stop reason: HOSPADM

## 2017-01-01 RX ORDER — COLCHICINE 0.6 MG/1
0.3 TABLET ORAL DAILY
Status: DISCONTINUED | OUTPATIENT
Start: 2017-01-01 | End: 2017-01-01

## 2017-01-01 RX ORDER — SODIUM CHLORIDE 9 MG/ML
250 INJECTION, SOLUTION INTRAVENOUS ONCE
Status: COMPLETED | OUTPATIENT
Start: 2017-01-01 | End: 2017-01-01

## 2017-01-01 RX ORDER — TRAMADOL HYDROCHLORIDE 50 MG/1
50 TABLET ORAL
Status: DISCONTINUED | OUTPATIENT
Start: 2017-01-01 | End: 2017-01-01 | Stop reason: HOSPADM

## 2017-01-01 RX ORDER — COLCHICINE 0.6 MG/1
0.3 TABLET ORAL DAILY
Status: DISCONTINUED | OUTPATIENT
Start: 2017-01-01 | End: 2017-01-01 | Stop reason: HOSPADM

## 2017-01-01 RX ORDER — LEVOTHYROXINE SODIUM 88 UG/1
88 TABLET ORAL
Status: DISCONTINUED | OUTPATIENT
Start: 2017-01-01 | End: 2017-01-01 | Stop reason: HOSPADM

## 2017-01-01 RX ORDER — FEBUXOSTAT 40 MG/1
40 TABLET, FILM COATED ORAL DAILY
Status: DISCONTINUED | OUTPATIENT
Start: 2017-01-01 | End: 2017-01-01

## 2017-01-01 RX ORDER — BISACODYL 5 MG
10 TABLET, DELAYED RELEASE (ENTERIC COATED) ORAL
Status: COMPLETED | OUTPATIENT
Start: 2017-01-01 | End: 2017-01-01

## 2017-01-01 RX ORDER — PHENYLEPHRINE HYDROCHLORIDE 10 MG/ML
INJECTION INTRAVENOUS
Status: DISCONTINUED
Start: 2017-01-01 | End: 2017-01-01

## 2017-01-01 RX ORDER — DIGOXIN 125 MCG
0.06 TABLET ORAL DAILY
Status: DISCONTINUED | OUTPATIENT
Start: 2017-01-01 | End: 2017-01-01 | Stop reason: HOSPADM

## 2017-01-01 RX ORDER — SODIUM CHLORIDE 900 MG/100ML
INJECTION INTRAVENOUS
Status: COMPLETED
Start: 2017-01-01 | End: 2017-01-01

## 2017-01-01 RX ORDER — DOBUTAMINE HYDROCHLORIDE 200 MG/100ML
INJECTION INTRAVENOUS
Status: DISPENSED
Start: 2017-01-01 | End: 2017-01-01

## 2017-01-01 RX ORDER — PHENYLEPHRINE HYDROCHLORIDE 10 MG/ML
INJECTION INTRAVENOUS
Status: COMPLETED
Start: 2017-01-01 | End: 2017-01-01

## 2017-01-01 RX ORDER — MORPHINE SULFATE IN 0.9 % NACL 1 MG/ML
5 PLASTIC BAG, INJECTION (ML) INTRAVENOUS
Status: DISCONTINUED | OUTPATIENT
Start: 2017-01-01 | End: 2017-01-01 | Stop reason: CLARIF

## 2017-01-01 RX ORDER — LEVOFLOXACIN 5 MG/ML
750 INJECTION, SOLUTION INTRAVENOUS
Status: COMPLETED | OUTPATIENT
Start: 2017-01-01 | End: 2017-01-01

## 2017-01-01 RX ORDER — ACETAMINOPHEN 325 MG/1
650 TABLET ORAL
Status: DISCONTINUED | OUTPATIENT
Start: 2017-01-01 | End: 2017-01-01

## 2017-01-01 RX ORDER — DILTIAZEM HYDROCHLORIDE 120 MG/1
120 CAPSULE, COATED, EXTENDED RELEASE ORAL DAILY
Status: DISCONTINUED | OUTPATIENT
Start: 2017-01-01 | End: 2017-01-01 | Stop reason: HOSPADM

## 2017-01-01 RX ORDER — FUROSEMIDE 40 MG/1
40 TABLET ORAL DAILY
Status: DISCONTINUED | OUTPATIENT
Start: 2017-01-01 | End: 2017-01-01 | Stop reason: HOSPADM

## 2017-01-01 RX ORDER — ONDANSETRON 2 MG/ML
4 INJECTION INTRAMUSCULAR; INTRAVENOUS
Status: DISCONTINUED | OUTPATIENT
Start: 2017-01-01 | End: 2017-01-01

## 2017-01-01 RX ORDER — CLINDAMYCIN HYDROCHLORIDE 300 MG/1
300 CAPSULE ORAL
Qty: 12 CAP | Refills: 6 | Status: SHIPPED | OUTPATIENT
Start: 2017-01-01 | End: 2017-01-01

## 2017-01-01 RX ORDER — FEBUXOSTAT 40 MG/1
40 TABLET, FILM COATED ORAL DAILY
COMMUNITY

## 2017-01-01 RX ORDER — MORPHINE SULFATE 5 MG/ML
INJECTION, SOLUTION INTRAVENOUS
Status: DISCONTINUED | OUTPATIENT
Start: 2017-01-01 | End: 2017-01-01 | Stop reason: HOSPADM

## 2017-01-01 RX ORDER — CEPHALEXIN 500 MG/1
500 CAPSULE ORAL 4 TIMES DAILY
Qty: 5 CAP | Refills: 0 | Status: SHIPPED | OUTPATIENT
Start: 2017-01-01 | End: 2017-01-01

## 2017-01-01 RX ORDER — LEVOTHYROXINE SODIUM 88 UG/1
88 TABLET ORAL
Status: DISCONTINUED | OUTPATIENT
Start: 2017-01-01 | End: 2017-01-01

## 2017-01-01 RX ORDER — DEXTROSE MONOHYDRATE AND SODIUM CHLORIDE 5; .9 G/100ML; G/100ML
75 INJECTION, SOLUTION INTRAVENOUS CONTINUOUS
Status: DISCONTINUED | OUTPATIENT
Start: 2017-01-01 | End: 2017-01-01

## 2017-01-01 RX ORDER — VANCOMYCIN HYDROCHLORIDE 500 MG/10ML
INJECTION, POWDER, LYOPHILIZED, FOR SOLUTION INTRAVENOUS
Status: COMPLETED
Start: 2017-01-01 | End: 2017-01-01

## 2017-01-01 RX ORDER — SODIUM CHLORIDE 0.9 % (FLUSH) 0.9 %
5-10 SYRINGE (ML) INJECTION EVERY 8 HOURS
Status: DISCONTINUED | OUTPATIENT
Start: 2017-01-01 | End: 2017-01-01

## 2017-01-01 RX ORDER — POTASSIUM CITRATE 10 MEQ/1
10 TABLET, EXTENDED RELEASE ORAL DAILY
Status: DISCONTINUED | OUTPATIENT
Start: 2017-01-01 | End: 2017-01-01

## 2017-01-01 RX ORDER — CEPHALEXIN 500 MG/1
500 CAPSULE ORAL 4 TIMES DAILY
Qty: 20 CAP | Refills: 0 | Status: SHIPPED | OUTPATIENT
Start: 2017-01-01 | End: 2017-01-01

## 2017-01-01 RX ORDER — METOPROLOL TARTRATE 50 MG/1
50 TABLET ORAL 2 TIMES DAILY
Status: DISCONTINUED | OUTPATIENT
Start: 2017-01-01 | End: 2017-01-01

## 2017-01-01 RX ORDER — SODIUM CHLORIDE 0.9 % (FLUSH) 0.9 %
5-10 SYRINGE (ML) INJECTION AS NEEDED
Status: DISCONTINUED | OUTPATIENT
Start: 2017-01-01 | End: 2017-01-01

## 2017-01-01 RX ORDER — SULFAMETHOXAZOLE AND TRIMETHOPRIM 800; 160 MG/1; MG/1
2 TABLET ORAL 2 TIMES DAILY
Qty: 40 TAB | Refills: 0 | Status: SHIPPED | OUTPATIENT
Start: 2017-01-01 | End: 2017-01-01

## 2017-01-01 RX ORDER — SIMVASTATIN 20 MG/1
20 TABLET, FILM COATED ORAL
Status: DISCONTINUED | OUTPATIENT
Start: 2017-01-01 | End: 2017-01-01

## 2017-01-01 RX ORDER — POTASSIUM CITRATE 15 MEQ/1
TABLET, EXTENDED RELEASE ORAL
COMMUNITY

## 2017-01-01 RX ORDER — TELMISARTAN 80 MG/1
80 TABLET ORAL DAILY
Status: DISCONTINUED | OUTPATIENT
Start: 2017-01-01 | End: 2017-01-01 | Stop reason: HOSPADM

## 2017-01-01 RX ORDER — DIPHENHYDRAMINE HYDROCHLORIDE 50 MG/ML
12.5 INJECTION, SOLUTION INTRAMUSCULAR; INTRAVENOUS
Status: COMPLETED | OUTPATIENT
Start: 2017-01-01 | End: 2017-01-01

## 2017-01-01 RX ORDER — ATROPINE SULFATE 0.1 MG/ML
0.5 INJECTION INTRAVENOUS AS NEEDED
Status: COMPLETED | OUTPATIENT
Start: 2017-01-01 | End: 2017-01-01

## 2017-01-01 RX ORDER — HYDRALAZINE HYDROCHLORIDE 20 MG/ML
10 INJECTION INTRAMUSCULAR; INTRAVENOUS ONCE
Status: COMPLETED | OUTPATIENT
Start: 2017-01-01 | End: 2017-01-01

## 2017-01-01 RX ORDER — SODIUM CHLORIDE 9 MG/ML
250 INJECTION, SOLUTION INTRAVENOUS
Status: COMPLETED | OUTPATIENT
Start: 2017-01-01 | End: 2017-01-01

## 2017-01-01 RX ORDER — SCOLOPAMINE TRANSDERMAL SYSTEM 1 MG/1
1.5 PATCH, EXTENDED RELEASE TRANSDERMAL
Status: DISCONTINUED | OUTPATIENT
Start: 2017-01-01 | End: 2017-01-01 | Stop reason: HOSPADM

## 2017-01-01 RX ORDER — CEPHALEXIN 500 MG/1
500 CAPSULE ORAL 4 TIMES DAILY
Qty: 40 CAP | Refills: 0 | Status: SHIPPED | OUTPATIENT
Start: 2017-01-01 | End: 2017-01-01

## 2017-01-01 RX ORDER — LANOLIN ALCOHOL/MO/W.PET/CERES
1.5 CREAM (GRAM) TOPICAL
Status: DISCONTINUED | OUTPATIENT
Start: 2017-01-01 | End: 2017-01-01

## 2017-01-01 RX ORDER — LIDOCAINE HYDROCHLORIDE 10 MG/ML
20 INJECTION INFILTRATION; PERINEURAL ONCE
Status: COMPLETED | OUTPATIENT
Start: 2017-01-01 | End: 2017-01-01

## 2017-01-01 RX ORDER — DOBUTAMINE HYDROCHLORIDE 200 MG/100ML
0-10 INJECTION INTRAVENOUS
Status: DISCONTINUED | OUTPATIENT
Start: 2017-01-01 | End: 2017-01-01

## 2017-01-01 RX ORDER — METOPROLOL TARTRATE 50 MG/1
100 TABLET ORAL 2 TIMES DAILY
Status: DISCONTINUED | OUTPATIENT
Start: 2017-01-01 | End: 2017-01-01

## 2017-01-01 RX ORDER — DOPAMINE HYDROCHLORIDE 160 MG/100ML
0-20 INJECTION, SOLUTION INTRAVENOUS
Status: DISCONTINUED | OUTPATIENT
Start: 2017-01-01 | End: 2017-01-01

## 2017-01-01 RX ORDER — SIMVASTATIN 20 MG/1
20 TABLET, FILM COATED ORAL
Status: DISCONTINUED | OUTPATIENT
Start: 2017-01-01 | End: 2017-01-01 | Stop reason: HOSPADM

## 2017-01-01 RX ORDER — MORPHINE SULFATE 10 MG/ML
INJECTION, SOLUTION INTRAMUSCULAR; INTRAVENOUS
Status: COMPLETED
Start: 2017-01-01 | End: 2017-01-01

## 2017-01-01 RX ORDER — FEBUXOSTAT 40 MG/1
40 TABLET, FILM COATED ORAL DAILY
Status: DISCONTINUED | OUTPATIENT
Start: 2017-01-01 | End: 2017-01-01 | Stop reason: HOSPADM

## 2017-01-01 RX ORDER — DILTIAZEM HYDROCHLORIDE 120 MG/1
120 CAPSULE, COATED, EXTENDED RELEASE ORAL DAILY
Status: DISCONTINUED | OUTPATIENT
Start: 2017-01-01 | End: 2017-01-01

## 2017-01-01 RX ORDER — DIGOXIN 125 MCG
0.06 TABLET ORAL DAILY
Status: DISCONTINUED | OUTPATIENT
Start: 2017-01-01 | End: 2017-01-01

## 2017-01-01 RX ORDER — LORAZEPAM 2 MG/ML
1 INJECTION INTRAMUSCULAR
Status: DISCONTINUED | OUTPATIENT
Start: 2017-01-01 | End: 2017-01-01 | Stop reason: HOSPADM

## 2017-01-01 RX ORDER — TRAMADOL HYDROCHLORIDE 50 MG/1
50 TABLET ORAL
Status: DISCONTINUED | OUTPATIENT
Start: 2017-01-01 | End: 2017-01-01

## 2017-01-01 RX ORDER — ACETAMINOPHEN 325 MG/1
650 TABLET ORAL
Status: COMPLETED | OUTPATIENT
Start: 2017-01-01 | End: 2017-01-01

## 2017-01-01 RX ORDER — MORPHINE SULFATE 10 MG/ML
5 INJECTION, SOLUTION INTRAMUSCULAR; INTRAVENOUS ONCE
Status: COMPLETED | OUTPATIENT
Start: 2017-01-01 | End: 2017-01-01

## 2017-01-01 RX ORDER — ATROPINE SULFATE 0.1 MG/ML
INJECTION INTRAVENOUS
Status: COMPLETED
Start: 2017-01-01 | End: 2017-01-01

## 2017-01-01 RX ORDER — SULFAMETHOXAZOLE AND TRIMETHOPRIM 800; 160 MG/1; MG/1
1 TABLET ORAL
Status: COMPLETED | OUTPATIENT
Start: 2017-01-01 | End: 2017-01-01

## 2017-01-01 RX ADMIN — DILTIAZEM HYDROCHLORIDE 120 MG: 120 CAPSULE, EXTENDED RELEASE ORAL at 08:55

## 2017-01-01 RX ADMIN — HYDRALAZINE HYDROCHLORIDE 10 MG: 20 INJECTION INTRAMUSCULAR; INTRAVENOUS at 06:01

## 2017-01-01 RX ADMIN — DILTIAZEM HYDROCHLORIDE 120 MG: 120 CAPSULE, EXTENDED RELEASE ORAL at 09:16

## 2017-01-01 RX ADMIN — METOPROLOL TARTRATE 50 MG: 50 TABLET ORAL at 23:02

## 2017-01-01 RX ADMIN — SIMVASTATIN 20 MG: 20 TABLET, FILM COATED ORAL at 23:30

## 2017-01-01 RX ADMIN — SODIUM CHLORIDE 500 ML: 900 INJECTION, SOLUTION INTRAVENOUS at 08:56

## 2017-01-01 RX ADMIN — MULTIPLE VITAMINS W/ MINERALS TAB 1 TABLET: TAB at 08:55

## 2017-01-01 RX ADMIN — SODIUM CHLORIDE 1000 MG: 900 INJECTION, SOLUTION INTRAVENOUS at 01:19

## 2017-01-01 RX ADMIN — SODIUM BICARBONATE: 84 INJECTION, SOLUTION INTRAVENOUS at 01:55

## 2017-01-01 RX ADMIN — LEVOFLOXACIN 750 MG: 5 INJECTION, SOLUTION INTRAVENOUS at 19:13

## 2017-01-01 RX ADMIN — SODIUM CHLORIDE 100 ML: 900 INJECTION, SOLUTION INTRAVENOUS at 16:00

## 2017-01-01 RX ADMIN — LEVOTHYROXINE SODIUM 88 MCG: 88 TABLET ORAL at 08:09

## 2017-01-01 RX ADMIN — COLCHICINE 0.3 MG: 0.6 TABLET, FILM COATED ORAL at 09:25

## 2017-01-01 RX ADMIN — APIXABAN 2.5 MG: 2.5 TABLET, FILM COATED ORAL at 18:13

## 2017-01-01 RX ADMIN — MORPHINE SULFATE 5 MG: 10 INJECTION INTRAMUSCULAR; INTRAVENOUS; SUBCUTANEOUS at 01:56

## 2017-01-01 RX ADMIN — TELMISARTAN 80 MG: 80 TABLET ORAL at 09:00

## 2017-01-01 RX ADMIN — LORAZEPAM 1 MG: 2 INJECTION, SOLUTION INTRAMUSCULAR; INTRAVENOUS at 12:27

## 2017-01-01 RX ADMIN — ATROPINE SULFATE 0.5 MG: 0.1 INJECTION, SOLUTION INTRAVENOUS at 20:04

## 2017-01-01 RX ADMIN — METOPROLOL TARTRATE 100 MG: 50 TABLET ORAL at 17:59

## 2017-01-01 RX ADMIN — SODIUM CHLORIDE 500 MG: 900 INJECTION, SOLUTION INTRAVENOUS at 15:49

## 2017-01-01 RX ADMIN — DILTIAZEM HYDROCHLORIDE 120 MG: 120 CAPSULE, COATED, EXTENDED RELEASE ORAL at 10:30

## 2017-01-01 RX ADMIN — SODIUM BICARBONATE: 84 INJECTION, SOLUTION INTRAVENOUS at 15:08

## 2017-01-01 RX ADMIN — LEVOTHYROXINE SODIUM 88 MCG: 88 TABLET ORAL at 08:32

## 2017-01-01 RX ADMIN — FUROSEMIDE 40 MG: 40 TABLET ORAL at 09:15

## 2017-01-01 RX ADMIN — PHENYLEPHRINE HYDROCHLORIDE 300 MCG/MIN: 10 INJECTION, SOLUTION INTRAMUSCULAR; INTRAVENOUS; SUBCUTANEOUS at 04:05

## 2017-01-01 RX ADMIN — ATROPINE SULFATE 0.5 MG: 0.1 INJECTION, SOLUTION INTRAVENOUS at 20:16

## 2017-01-01 RX ADMIN — FEBUXOSTAT 40 MG: 40 TABLET ORAL at 09:02

## 2017-01-01 RX ADMIN — TRAMADOL HYDROCHLORIDE 50 MG: 50 TABLET, FILM COATED ORAL at 22:00

## 2017-01-01 RX ADMIN — SIMVASTATIN 20 MG: 10 TABLET, FILM COATED ORAL at 02:33

## 2017-01-01 RX ADMIN — BISACODYL 10 MG: 5 TABLET, COATED ORAL at 16:42

## 2017-01-01 RX ADMIN — CEFAZOLIN SODIUM 1 G: 1 INJECTION, POWDER, FOR SOLUTION INTRAMUSCULAR; INTRAVENOUS at 00:02

## 2017-01-01 RX ADMIN — CEFAZOLIN SODIUM 1 G: 1 INJECTION, POWDER, FOR SOLUTION INTRAMUSCULAR; INTRAVENOUS at 08:54

## 2017-01-01 RX ADMIN — DIGOXIN 0.06 MG: 125 TABLET ORAL at 12:20

## 2017-01-01 RX ADMIN — SODIUM BICARBONATE: 84 INJECTION, SOLUTION INTRAVENOUS at 17:37

## 2017-01-01 RX ADMIN — SODIUM BICARBONATE: 84 INJECTION, SOLUTION INTRAVENOUS at 00:23

## 2017-01-01 RX ADMIN — SIMVASTATIN 20 MG: 10 TABLET, FILM COATED ORAL at 22:28

## 2017-01-01 RX ADMIN — TELMISARTAN 80 MG: 80 TABLET ORAL at 09:11

## 2017-01-01 RX ADMIN — FEBUXOSTAT 40 MG: 40 TABLET ORAL at 08:54

## 2017-01-01 RX ADMIN — SODIUM CHLORIDE 250 ML/HR: 900 INJECTION, SOLUTION INTRAVENOUS at 18:04

## 2017-01-01 RX ADMIN — SIMVASTATIN 20 MG: 20 TABLET, FILM COATED ORAL at 23:01

## 2017-01-01 RX ADMIN — CEFAZOLIN SODIUM 1 G: 1 INJECTION, POWDER, FOR SOLUTION INTRAMUSCULAR; INTRAVENOUS at 09:15

## 2017-01-01 RX ADMIN — RIVAROXABAN 15 MG: 15 TABLET, FILM COATED ORAL at 08:55

## 2017-01-01 RX ADMIN — PIPERACILLIN SODIUM,TAZOBACTAM SODIUM 3.38 G: 3; .375 INJECTION, POWDER, FOR SOLUTION INTRAVENOUS at 20:41

## 2017-01-01 RX ADMIN — ACETAMINOPHEN 650 MG: 325 TABLET, FILM COATED ORAL at 23:01

## 2017-01-01 RX ADMIN — TRAMADOL HYDROCHLORIDE 50 MG: 50 TABLET, FILM COATED ORAL at 22:30

## 2017-01-01 RX ADMIN — TRAMADOL HYDROCHLORIDE 50 MG: 50 TABLET, FILM COATED ORAL at 16:20

## 2017-01-01 RX ADMIN — TRAMADOL HYDROCHLORIDE 50 MG: 50 TABLET, FILM COATED ORAL at 08:55

## 2017-01-01 RX ADMIN — ACETAMINOPHEN 650 MG: 325 TABLET, FILM COATED ORAL at 21:01

## 2017-01-01 RX ADMIN — SODIUM CHLORIDE 250 ML: 900 INJECTION, SOLUTION INTRAVENOUS at 05:31

## 2017-01-01 RX ADMIN — CEFTRIAXONE SODIUM 1 G: 1 INJECTION, POWDER, FOR SOLUTION INTRAMUSCULAR; INTRAVENOUS at 00:26

## 2017-01-01 RX ADMIN — METOPROLOL TARTRATE 100 MG: 50 TABLET ORAL at 09:11

## 2017-01-01 RX ADMIN — TRAMADOL HYDROCHLORIDE 50 MG: 50 TABLET, FILM COATED ORAL at 09:10

## 2017-01-01 RX ADMIN — DOBUTAMINE HYDROCHLORIDE 30 MCG/KG/MIN: 200 INJECTION INTRAVENOUS at 06:20

## 2017-01-01 RX ADMIN — FUROSEMIDE 40 MG: 40 TABLET ORAL at 08:55

## 2017-01-01 RX ADMIN — ONDANSETRON 4 MG: 2 INJECTION INTRAMUSCULAR; INTRAVENOUS at 20:57

## 2017-01-01 RX ADMIN — MORPHINE SULFATE: 5 INJECTION, SOLUTION INTRAVENOUS at 02:50

## 2017-01-01 RX ADMIN — DOBUTAMINE HYDROCHLORIDE 30 MCG/KG/MIN: 200 INJECTION INTRAVENOUS at 01:31

## 2017-01-01 RX ADMIN — DIGOXIN 0.06 MG: 125 TABLET ORAL at 09:13

## 2017-01-01 RX ADMIN — COLCHICINE 0.3 MG: 0.6 TABLET, FILM COATED ORAL at 08:55

## 2017-01-01 RX ADMIN — SODIUM BICARBONATE: 84 INJECTION, SOLUTION INTRAVENOUS at 06:26

## 2017-01-01 RX ADMIN — ONDANSETRON 4 MG: 2 INJECTION INTRAMUSCULAR; INTRAVENOUS at 19:03

## 2017-01-01 RX ADMIN — LEVOTHYROXINE SODIUM 88 MCG: 88 TABLET ORAL at 11:44

## 2017-01-01 RX ADMIN — METOPROLOL TARTRATE 100 MG: 50 TABLET ORAL at 18:24

## 2017-01-01 RX ADMIN — WATER 1 G: 1 INJECTION INTRAMUSCULAR; INTRAVENOUS; SUBCUTANEOUS at 13:11

## 2017-01-01 RX ADMIN — DIGOXIN 0.06 MG: 125 TABLET ORAL at 14:47

## 2017-01-01 RX ADMIN — DOBUTAMINE HYDROCHLORIDE 10 MCG/KG/MIN: 200 INJECTION INTRAVENOUS at 20:50

## 2017-01-01 RX ADMIN — MULTIPLE VITAMINS W/ MINERALS TAB 1 TABLET: TAB at 09:33

## 2017-01-01 RX ADMIN — DOBUTAMINE HYDROCHLORIDE 30 MCG/KG/MIN: 200 INJECTION INTRAVENOUS at 21:46

## 2017-01-01 RX ADMIN — LEVOTHYROXINE SODIUM 88 MCG: 88 TABLET ORAL at 12:39

## 2017-01-01 RX ADMIN — FUROSEMIDE 40 MG: 40 TABLET ORAL at 09:12

## 2017-01-01 RX ADMIN — SIMVASTATIN 20 MG: 20 TABLET, FILM COATED ORAL at 22:27

## 2017-01-01 RX ADMIN — MULTIPLE VITAMINS W/ MINERALS TAB 1 TABLET: TAB at 10:30

## 2017-01-01 RX ADMIN — DIPHENHYDRAMINE HYDROCHLORIDE 12.5 MG: 50 INJECTION, SOLUTION INTRAMUSCULAR; INTRAVENOUS at 19:47

## 2017-01-01 RX ADMIN — MORPHINE SULFATE 5 MG: 10 INJECTION, SOLUTION INTRAMUSCULAR; INTRAVENOUS at 01:56

## 2017-01-01 RX ADMIN — LEVOTHYROXINE SODIUM 88 MCG: 88 TABLET ORAL at 07:22

## 2017-01-01 RX ADMIN — WATER 1 G: 1 INJECTION INTRAMUSCULAR; INTRAVENOUS; SUBCUTANEOUS at 15:08

## 2017-01-01 RX ADMIN — MULTIPLE VITAMINS W/ MINERALS TAB 1 TABLET: TAB at 09:02

## 2017-01-01 RX ADMIN — DOPAMINE HYDROCHLORIDE IN DEXTROSE 20 MCG/KG/MIN: 1.6 INJECTION, SOLUTION INTRAVENOUS at 23:30

## 2017-01-01 RX ADMIN — FEBUXOSTAT 40 MG: 40 TABLET ORAL at 10:30

## 2017-01-01 RX ADMIN — POTASSIUM CITRATE 10 MEQ: 10 TABLET ORAL at 12:20

## 2017-01-01 RX ADMIN — CEFAZOLIN SODIUM 1 G: 1 INJECTION, POWDER, FOR SOLUTION INTRAMUSCULAR; INTRAVENOUS at 17:59

## 2017-01-01 RX ADMIN — DILTIAZEM HYDROCHLORIDE 120 MG: 120 CAPSULE, COATED, EXTENDED RELEASE ORAL at 12:21

## 2017-01-01 RX ADMIN — WATER 1 G: 1 INJECTION INTRAMUSCULAR; INTRAVENOUS; SUBCUTANEOUS at 14:36

## 2017-01-01 RX ADMIN — RIVAROXABAN 15 MG: 15 TABLET, FILM COATED ORAL at 09:25

## 2017-01-01 RX ADMIN — LEVOTHYROXINE SODIUM 88 MCG: 88 TABLET ORAL at 10:30

## 2017-01-01 RX ADMIN — DEXTROSE AND SODIUM CHLORIDE 75 ML/HR: 5; 900 INJECTION, SOLUTION INTRAVENOUS at 23:18

## 2017-01-01 RX ADMIN — FEBUXOSTAT 40 MG: 40 TABLET ORAL at 09:15

## 2017-01-01 RX ADMIN — DOPAMINE HYDROCHLORIDE IN DEXTROSE 20 MCG/KG/MIN: 1.6 INJECTION, SOLUTION INTRAVENOUS at 05:25

## 2017-01-01 RX ADMIN — DILTIAZEM HYDROCHLORIDE 120 MG: 120 CAPSULE, EXTENDED RELEASE ORAL at 14:47

## 2017-01-01 RX ADMIN — TRAMADOL HYDROCHLORIDE 50 MG: 50 TABLET, FILM COATED ORAL at 23:19

## 2017-01-01 RX ADMIN — CEFAZOLIN SODIUM 1 G: 1 INJECTION, POWDER, FOR SOLUTION INTRAMUSCULAR; INTRAVENOUS at 23:42

## 2017-01-01 RX ADMIN — TRAMADOL HYDROCHLORIDE 50 MG: 50 TABLET, FILM COATED ORAL at 16:42

## 2017-01-01 RX ADMIN — METOPROLOL TARTRATE 50 MG: 50 TABLET ORAL at 18:13

## 2017-01-01 RX ADMIN — SIMVASTATIN 20 MG: 10 TABLET, FILM COATED ORAL at 22:00

## 2017-01-01 RX ADMIN — DIGOXIN 0.06 MG: 125 TABLET ORAL at 08:55

## 2017-01-01 RX ADMIN — LIDOCAINE HYDROCHLORIDE 20 ML: 10 INJECTION, SOLUTION INFILTRATION; PERINEURAL at 00:10

## 2017-01-01 RX ADMIN — FEBUXOSTAT 40 MG: 40 TABLET ORAL at 09:26

## 2017-01-01 RX ADMIN — PHENYLEPHRINE HYDROCHLORIDE: 10 INJECTION INTRAVENOUS at 04:05

## 2017-01-01 RX ADMIN — MULTIPLE VITAMINS W/ MINERALS TAB 1 TABLET: TAB at 10:40

## 2017-01-01 RX ADMIN — PHENYLEPHRINE HYDROCHLORIDE 300 MCG/MIN: 10 INJECTION, SOLUTION INTRAMUSCULAR; INTRAVENOUS; SUBCUTANEOUS at 10:38

## 2017-01-01 RX ADMIN — ACETAMINOPHEN 650 MG: 325 TABLET, FILM COATED ORAL at 14:31

## 2017-01-01 RX ADMIN — DIGOXIN 0.06 MG: 125 TABLET ORAL at 10:31

## 2017-01-01 RX ADMIN — TELMISARTAN 80 MG: 80 TABLET ORAL at 09:25

## 2017-01-01 RX ADMIN — SULFAMETHOXAZOLE AND TRIMETHOPRIM 1 TABLET: 800; 160 TABLET ORAL at 19:47

## 2017-01-01 RX ADMIN — APIXABAN 2.5 MG: 2.5 TABLET, FILM COATED ORAL at 09:02

## 2017-01-01 RX ADMIN — APIXABAN 2.5 MG: 2.5 TABLET, FILM COATED ORAL at 10:30

## 2017-01-01 RX ADMIN — SODIUM CHLORIDE 500 ML: 900 INJECTION, SOLUTION INTRAVENOUS at 14:22

## 2017-01-01 RX ADMIN — DOPAMINE HYDROCHLORIDE IN DEXTROSE 5 MCG/KG/MIN: 1.6 INJECTION, SOLUTION INTRAVENOUS at 17:37

## 2017-01-01 RX ADMIN — VANCOMYCIN HYDROCHLORIDE 500 MG: 500 INJECTION, POWDER, LYOPHILIZED, FOR SOLUTION INTRAVENOUS at 16:00

## 2017-01-01 RX ADMIN — PHENYLEPHRINE HYDROCHLORIDE 100 MCG/MIN: 10 INJECTION, SOLUTION INTRAMUSCULAR; INTRAVENOUS; SUBCUTANEOUS at 23:04

## 2017-01-01 RX ADMIN — TRAMADOL HYDROCHLORIDE 50 MG: 50 TABLET, FILM COATED ORAL at 10:30

## 2017-01-01 RX ADMIN — TRAMADOL HYDROCHLORIDE 50 MG: 50 TABLET, FILM COATED ORAL at 18:13

## 2017-01-01 RX ADMIN — TRAMADOL HYDROCHLORIDE 50 MG: 50 TABLET, FILM COATED ORAL at 06:25

## 2017-01-01 RX ADMIN — DOPAMINE HYDROCHLORIDE IN DEXTROSE 10 MCG/KG/MIN: 1.6 INJECTION, SOLUTION INTRAVENOUS at 18:17

## 2017-01-01 RX ADMIN — DOPAMINE HYDROCHLORIDE IN DEXTROSE 7.5 MCG/KG/MIN: 1.6 INJECTION, SOLUTION INTRAVENOUS at 18:10

## 2017-01-01 RX ADMIN — COLCHICINE 0.3 MG: 0.6 TABLET, FILM COATED ORAL at 09:15

## 2017-01-01 RX ADMIN — METOPROLOL TARTRATE 100 MG: 50 TABLET ORAL at 09:30

## 2017-01-01 RX ADMIN — TRAMADOL HYDROCHLORIDE 50 MG: 50 TABLET, FILM COATED ORAL at 06:48

## 2017-01-01 RX ADMIN — METOPROLOL TARTRATE 100 MG: 50 TABLET ORAL at 08:56

## 2017-01-01 RX ADMIN — ONDANSETRON 4 MG: 2 INJECTION INTRAMUSCULAR; INTRAVENOUS at 01:55

## 2017-01-01 RX ADMIN — MULTIPLE VITAMINS W/ MINERALS TAB 1 TABLET: TAB at 12:21

## 2017-01-01 RX ADMIN — TRAMADOL HYDROCHLORIDE 50 MG: 50 TABLET, FILM COATED ORAL at 23:01

## 2017-01-01 RX ADMIN — ACETAMINOPHEN 650 MG: 325 TABLET, FILM COATED ORAL at 20:22

## 2017-01-04 NOTE — IP AVS SNAPSHOT
Rita Elisa 
 
 
 [address] [facility] 
2nd Floor [address] [id number] [phone] Patient: Edna Rico MRN: [id number] You are allergic to the following Allergen Reactions Augmentin (Amoxicillin-Pot Clavulanate) Unknown (comments) Biaxin (Clarithromycin) Unknown (comments) Codeine Unknown (comments) Doxycycline Diarrhea Procardia (Nifedipine) Unknown (comments) Trimox (Amoxicillin) Unknown (comments) Recent Documentation Smoking Status Never Assessed Emergency Contacts Name Discharge Info Relation Home Work Mobile Aime Rojo  Child [2] [phone] About your hospitalization You were admitted on:  January 4, 2017 You last received care in the:  [facility]  W Clemente Caceres You were discharged on:  January 4, 2017 Unit phone number:  [phone] Why you were hospitalized Your primary diagnosis was:  Not on File Your Primary Care Physician (PCP) Primary Care Physician Office Phone Office Fax [address], [address] [phone] Follow-up Information None Your Appointments Wednesday January 25, 2017 11:00 AM EST  
WOUND CARE FOLLOW UP with Lindsey Clark MD  
[facility] OP WOUND CARE [facility] [address] [facility] [address] [id number]  
[phone] Current Discharge Medication List  
  
START taking these medications Dose & Instructions Dispensing Information Comments Morning Noon Evening Bedtime  
 clindamycin 300 mg capsule Commonly known as:  CLEOCIN Your next dose is: Today, Tomorrow Other:  _________ Dose:  300 mg Take 1 Cap by mouth Every Mon, Wed & Sun for 30 days. Indications: SKIN AND SKIN STRUCTURE ANAEROBIC INFECTION Quantity:  12 Cap Refills:  6 ASK your doctor about these medications Dose & Instructions Dispensing Information Comments Morning Noon Evening Bedtime  
 allopurinol 100 mg tablet Commonly known as:  Michelle Vimal Your next dose is: Today, Tomorrow Other:  _________ Dose:  100 mg Take 100 mg by mouth daily. Refills:  0  
     
   
   
   
  
 colchicine 0.6 mg tablet Your next dose is: Today, Tomorrow Other:  _________ Dose:  0.3 mg Take 0.3 mg by mouth daily. Refills:  0  
     
   
   
   
  
 collagenase 250 unit/gram ointment Commonly known as:  SANTYL Your next dose is: Today, Tomorrow Other:  _________ Dose:  1 Tube Apply 1 Tube to affected area daily. Quantity:  15 g Refills:  0  
     
   
   
   
  
 digoxin 0.125 mg tablet Commonly known as:  LANOXIN Your next dose is: Today, Tomorrow Other:  _________ Dose:  0.0625 mg Take 0.0625 mg by mouth daily. Refills:  0  
     
   
   
   
  
 dilTIAZem  mg XR capsule Commonly known as:  DILACOR XR Your next dose is: Today, Tomorrow Other:  _________ Take  by mouth daily. Refills:  0  
     
   
   
   
  
 ergocalciferol 50,000 unit capsule Commonly known as:  ERGOCALCIFEROL Your next dose is: Today, Tomorrow Other:  _________ Dose:  400 Units Take 400 Units by mouth daily. Refills:  0 Flaxseed Oil Oil Your next dose is: Today, Tomorrow Other:  _________  
   
   
 by Does Not Apply route. Refills:  0  
     
   
   
   
  
 furosemide 40 mg tablet Commonly known as:  LASIX Your next dose is: Today, Tomorrow Other:  _________ Dose:  40 mg Take 40 mg by mouth daily. Refills:  0  
     
   
   
   
  
 levoFLOXacin 750 mg tablet Commonly known as:  Kenard Catalino Your next dose is: Today, Tomorrow Other:  _________  Dose:  750 mg  
 Take 1 Tab by mouth every fourty-eight (48) hours. Quantity:  4 Tab Refills:  0  
     
   
   
   
  
 levothyroxine 88 mcg tablet Commonly known as:  SYNTHROID Your next dose is: Today, Tomorrow Other:  _________ Dose:  88 mcg Take 88 mcg by mouth Daily (before breakfast). Refills:  0  
     
   
   
   
  
 metoprolol tartrate 50 mg tablet Commonly known as:  LOPRESSOR Your next dose is: Today, Tomorrow Other:  _________ Dose:  100 mg Take 100 mg by mouth two (2) times a day. Refills:  0  
     
   
   
   
  
 multivitamin, tx-iron-ca-min 9 mg iron-400 mcg Tab tablet Commonly known as:  THERA-M w/ IRON Your next dose is: Today, Tomorrow Other:  _________ Dose:  1 Tab Take 1 Tab by mouth daily. Refills:  0  
     
   
   
   
  
 rivaroxaban 15 mg Tab tablet Commonly known as:  Jocelyn Fellers Your next dose is: Today, Tomorrow Other:  _________ Dose:  15 mg Take 15 mg by mouth daily (with breakfast). Refills:  0  
     
   
   
   
  
 simvastatin 20 mg tablet Commonly known as:  ZOCOR Your next dose is: Today, Tomorrow Other:  _________ Dose:  20 mg Take 20 mg by mouth nightly. Refills:  0  
     
   
   
   
  
 telmisartan 80 mg tablet Commonly known as:  Shaun Wakeeney Your next dose is: Today, Tomorrow Other:  _________ Dose:  80 mg Take 80 mg by mouth daily. Refills:  0  
     
   
   
   
  
 traMADol 50 mg tablet Commonly known as:  ULTRAM  
   
Your next dose is: Today, Tomorrow Other:  _________ Dose:  50 mg Take 1 Tab by mouth every six (6) hours as needed. Max Daily Amount: 200 mg. Quantity:  10 Tab Refills:  0  
     
   
   
   
  
 vancomycin 1,000 mg 1,000 mg IVPB Your next dose is: Today, Tomorrow Other:  _________  Dose:  1000 mg  
 1,000 mg by IntraVENous route every twenty-four (24) hours. Quantity:  10 Dose Refills:  0 Where to Get Your Medications These medications were sent to [address] [place] [address] [id number] Phone:  [phone]  
  clindamycin 300 mg capsule Discharge Instructions Wound Care Instructions Patient: Lydia Hayes MRN: [id number]  SSN: [id number] YOB: 1925  Age:89 y.o. Sex: female Ms. Augustine Yulisa, Dr. Margaux Sales recommends the following discharge instruction: 
 
Ayden Dills Felt/Foam Offloading    Removable Cast Brie Said Wedge Shoe     Wheelchair Total Contact Cast     Multiplodus Splint Crutches     Surgical Shoe Mattress:  
Other:  
 
Edema Control Elevated legs as much as possible. Recommend above level of heart. Layered Wraps                Left         Right        BILATERAL 
        - Type:               Unna Boot         Multi-Layer Two Layers       Three Layers     Four Layers Tubular Bandages           Left         Right     SIZE:   
     Stockings                         Left         Right Compression Pump        Left         Right   
___ millimeters of mercury 
___ millimeters of mercury for ___ minutes for ___ day(s) Other: Please remove unna boot to left lower leg and place patient's home compression stocking. IF WOUND ON RIGHT LEG IS COMPLETELY HEALED, please remove unna boot and place patient's home compression stocking. Please inform the wound center if both unna boots are removed and compression stockings placed as home care for wound care will need to be discontinued in this case. Nutritional Supplements Multi-Vitamin Other:   
  
[employer]  
     Home Health:   
    Long Term Care:   
    DME:  
 
Consult Nutrition Vascular Orthotist/Pedorthotist  
     Infectious Disease Lymphedema Therapist  
Other:  
 
Dressings: 
Another brand of generically equivalent product may be dispensed unless specifically ordered otherwise. Home Ulcer/wound Hygiene (cleanse with) Distilled Water Normal Saline Wound Cleanser Mild antimicrobial soap and water Chlorhexidine liquid soap and water or saline Other:  
 
Apply Hydrogel     Hypergel      Aquacel Ag Cadexomer Iodine                 (Iodosorb)     Silver Alginate       Medihoney:  
     Collagenase:Santyl     Calcium Alginate      Collagen:  
     Plain Foam     Non-Adherent Contact         Layer      Xeroform Silver foam      Hydrocolloid      Transparent Film Acticoat      Adaptic:   
 
Other/Specific Instructions: 
 
Cover With 
     Dry Gauze and Roll Gauze Foam and Roll Gauze Dry Gauze Bordered gauze: Foam          Bordered            Nonbordered Secure with Tape Other [place]-Ulcer Care Cream  
     Lotion Ointment Barrier Other:  
 
Change Dressing Once Daily Every Other Day Every 3 Days Every 5 Days Every 7 Days Do Not Change 2 x per week (Mon and Thurs. ) 3 x per week (Mon, Wed, Fri. ) Other Follow-Up: 
  Schedule follow up in 3  weeks with: 
   As Needed (PRN) Francisco Lee, DPM  
    CATHERINE Peter DPM DPISAAC  
    Nurse Visit Please call the wound clinic ([phone]) regarding any questions or concerns. Discharge Orders None Introducing [facility]! Wilmer Campos introduces [employer] patient portal. Now you can access parts of your medical record, email your doctor's office, and request medication refills online. 1. In your internet browser, go to [email or web address] 2. Click on the First Time User? Click Here link in the Sign In box. You will see the New Member Sign Up page. 3. Enter your Tabula Access Code exactly as it appears below. You will not need to use this code after youve completed the sign-up process. If you do not sign up before the expiration date, you must request a new code. · [employer] Access Code: [email or web address] Expires: 1/18/2017 10:29 AM 
 
4. Enter the last four digits of your Social Security Number (xxxx) and Date of Birth (mm/dd/yyyy) as indicated and click Submit. You will be taken to the next sign-up page. 5. Create a Tabula ID. This will be your Tabula login ID and cannot be changed, so think of one that is secure and easy to remember. 6. Create a Tabula password. You can change your password at any time. 7. Enter your Password Reset Question and Answer. This can be used at a later time if you forget your password. 8. Enter your e-mail address. You will receive e-mail notification when new information is available in [address] 9. Click Sign Up. You can now view and download portions of your medical record. 10. Click the Download Summary menu link to download a portable copy of your medical information. If you have questions, please visit the Frequently Asked Questions section of the Tabula website. Remember, Tabula is NOT to be used for urgent needs. For medical emergencies, dial 911. Now available from your iPhone and Android! General Information Please provide this summary of care documentation to your next provider. Patient Signature:  ____________________________________________________________ Date:  ____________________________________________________________  
  
Abi Moulds Provider Signature:  ____________________________________________________________ Date:  ____________________________________________________________

## 2017-01-04 NOTE — CONSULTS
[facility] WOUND CARE INITIAL/ follow up NOTE      Patient: Rebecca Greer MRN: [id number]  SSN: [id number]    YOB: 1925  Age: 80 y.o. Sex: female      Primary Care Provider:  Troy Tsai MD  Date of Visit: 1/4/2017    Assessment : Follow up Visit Week: [address] admission last month for Pneumonia. Was at [facility]. after that. Left leg cellulitis much improved. Less  Erythema. Now has stasis dermatitis Still with significant swelling  Much improved  Rt leg wound. Swelling of Rt leg down - with Unna boot    wrinkled and dry skin    Plan/ RECCOMENDATIONS:  Clindamycin 300 mg 3 times a week for Secondary prophylaxis of recurrent Cellulitis  Allergic to Penicillin and Clarithromycin  Continue Unna boot for Rt leg   Unna boot to Lt leg - once swelling reduced back to Compression stockings    Discussed at length with pt and her daughter  RTC in 3 week      FIRST VISIT on 10-28-16    ASSESSMENT:   1. Rt Lower mid leg traumatic wound. Rolled edge on the medial side   Infected with K.oxytoca - cx of 10-21. Was on Doxycycline  Did not toletrate     2. Atrial Fib  3. Gout  4. Sleep apnea  5. Multiple antibiotic Allergies    WOUND POA CONDITIONS    Wound Leg Right; Lower; Anterior (Active)   DRESSING STATUS Removed 10/28/2016 11:54 AM   DRESSING TYPE Other (Comment) 10/21/2016  1:27 PM   Wound Dimensions (length x width x depth) 2.2x4.1x0.1 10/28/2016 11:54 AM   Condition of Base Eschar 10/28/2016 11:54 AM   Condition of Edges Open 10/28/2016 11:54 AM   Tissue Type Black;Red;Yellow 10/28/2016 11:54 AM   Tissue Type Percent Black 5 % 10/21/2016  1:27 PM   Tissue Type Percent Red 90 10/28/2016 11:54 AM   Tissue Type Percent Yellow 5 10/28/2016 11:54 AM   Drainage Amount  Small  10/28/2016 11:54 AM   Drainage Color Sanguinous 10/28/2016 11:54 AM   Wound Odor None 10/28/2016 11:54 AM   Periwound Skin Condition Edematous 10/28/2016 11:54 AM   Cleansing and Cleansing Agents  Normal saline 10/28/2016 11:54 AM   Dressing Type Applied Other (Comment) 10/21/2016  1:27 PM   Procedure Tolerated Well 10/21/2016  1:27 PM   Number of days:7           PLAN /RECOMMENDATIONS:  Cristina based dressings  Keflex 500 mg Po Q 8 hrs X 7 days- Called RN at PCP office . PT has taken and tolerated this in the past  RTC one week      DEBRIDEMENT NOTE    Selective sharp instrument debridement of slough and devitilized tissue    After the benefits/risks/SE were discussed, the patient agreed to proceed. Time out was done:   * Patient was identified by name and date of birth   * Agreement on procedure being performed was verified   * Procedure site verified and marked as necessary   * Patient was positioned for comfort   * Consent was signed and verified. Site: Mid Rt L Leg    Instruments used:    []  Dermal curette  [] Blade        [x] #15  [] #10  [x] Forceps  [] Tissue nippers  [x] sterile scissors  [] Other     Anesthesia:    [x]  EMLA 2.5% cream: applied to wound beds for approximately 15minutes. []   Lidocaine 2% Topical Gel      []  Lidocaine injectable 1% with epinephrine 1:100,000    []  Lidocaine injectable 1% without epinephrine    []  Other:     []  None         [] patient is insensate due to neuropathy         [] patient declines        [] allergy to anesthetic        [] tissue for debridement is either superficial, loosely adherent and/or necrotic and denervated     After satisfactory anesthesia achieved, the wound/s was/were sharply debrided necrotic, devitalized and granulation tissue down to the SQ layer, revealing a clean and viable wound bed. Post debridement measurement was ___x same_____x___cm . Bleeding: <5mL Resolved with light focal pressure. Wounds were cleaned and irrigated with saline.      Wound care applied:   []   Hydrogell   []  Hypergel   []   Hydrofiber/Aquacel      []   Cadexomer Iodine (Iodosorb)   []  Silver Alginate    []   Medihoney:    []   Collagenase:Santyl   []  Calcium Alginate   []   Collagen: []   Foam   []  Non-Adherent Contact Layer   []   Xeroform    []   Adaptec:   []   Hydrocolloid   []   Transparent Film    []   Promogran   [x]   Cristina   []   Promogran       []  Antibiotic ointment/cream  []   Wound VAC   []   Other (see below)     Other:     []   Dry Gauze and Roll Gauze    [x]   Foam and Roll Gauze    []   Dry Gauze    []    Bordered gauze:     []   Secure with Tape    []   Other      []   Compression Wrap:          []   Unna Boot    []Multi-Layer    [x]Tubular Bandages       Helena-Ulcer Care    []   Cream     []   Lotion     [x]   Ointment     []   Barrier     []   Other:       The patient tolerated the procedure well with no complications. The patient left the exam room in satisfactory and stable condition. Traa Cisneros MD    PROCEDURE NOTE:        FIRST VISIT DATA: on 10-28-16  Reason for consult:   Yolanda Camacho is 80 y.o. female on whom Wound Care Service is being consulted for LLleg traumatic wound    History of Present Illness: On First Visit    Patient presented to the wound center on 10-21-16 for evaluation of a right lower leg injury. She was accompanied by her daughter. The patient reported that she injured her right leg on 9/23/16 when reaching for something in her kitchen cabinet. A metal bowl fell from the top shelf and hit her shin. To date, she has been seen by dermatology and primary care. She is also receiving skilled nursing [facility] three times per week. She changes her dressings daily, doing them herself on days when the nurse does not come. She was taking Doxycycline, prescribed by her PCP, but states it caused severe diarrhea and she discontinued it. She had not been given another antibiotic. There is an open wound on the anterior aspect of the right lower leg. Some erythema and ecchymosis is noted in the helena wound. The patient denies pain or discomfort.   A culture is taken,  Which has grown K.oxytoca She states she will see a vascular specialist next week for venous and arterial studies on her lower extremities. Her legs are edematous, the right is less swollen than the left. Patient states she has chronic swelling in her left leg and wears compression stockings when she can get them. Asked me call her PCP office to verify what antibiotics she has tolerated in the past.      Patient Active Problem List   Diagnosis Code    Wound dehiscence, traumatic injury repair T81.33XA    Traumatic open wound of right lower leg with infection S81.801A, L08.9    Cellulitis of left lower extremity L03. 116    Pneumonia J18.9    Sepsis (HCC) A41.9    Cellulitis of leg, left L03.116     Allergies   Allergen Reactions    Augmentin [Amoxicillin-Pot Clavulanate] Unknown (comments)    Biaxin [Clarithromycin] Unknown (comments)    Codeine Unknown (comments)    Doxycycline Diarrhea    Procardia [Nifedipine] Unknown (comments)    Trimox [Amoxicillin] Unknown (comments)      Past Medical History   Diagnosis Date    Atrial fibrillation ([facility] [address]     Gout     Sleep apnea      Past Surgical History   Procedure Laterality Date    Hx heart catheterization       Social History   Substance Use Topics    Smoking status: Not on file    Smokeless tobacco: Not on file    Alcohol use Not on file     Family History   Problem Relation Age of Onset    Breast Cancer Child 39    Breast Cancer Maternal Grandmother      Prior to Admission medications    Medication Sig Start Date End Date Taking? Authorizing Provider   collagenase (SANTYL) 250 unit/gram ointment Apply 1 Tube to affected area daily. 11/30/16   Moo Vuong MD   levoFLOXacin (LEVAQUIN) 750 mg tablet Take 1 Tab by mouth every fourty-eight (48) hours. 11/30/16   Moo Vuong MD   traMADol (ULTRAM) 50 mg tablet Take 1 Tab by mouth every six (6) hours as needed.  Max Daily Amount: 200 mg. 11/30/16   Moo Vuong MD   vancomycin 1,000 mg 1,000 mg IVPB 1,000 mg by IntraVENous route every twenty-four (24) hours. 11/30/16   Moo Vuong MD   dilTIAZem XR (DILACOR XR) 120 mg XR capsule Take  by mouth daily. Historical Provider   levothyroxine (SYNTHROID) 88 mcg tablet Take 88 mcg by mouth Daily (before breakfast). Rudolph Gaming MD   simvastatin (ZOCOR) 20 mg tablet Take 20 mg by mouth nightly. Rudolph Gaming MD   telmisartan (MICARDIS) 80 mg tablet Take 80 mg by mouth daily. Rudolph Gaming MD   rivaroxaban (XARELTO) 15 mg tab tablet Take 15 mg by mouth daily (with breakfast). Rudolph Gaming MD   furosemide (LASIX) 40 mg tablet Take 40 mg by mouth daily. Rudolph Gaming MD   ergocalciferol (ERGOCALCIFEROL) 50,000 unit capsule Take 400 Units by mouth daily. Rudolph Gaming MD   multivitamin, tx-iron-ca-min (THERA-M W/ IRON) 9 mg iron-400 mcg tab tablet Take 1 Tab by mouth daily. Rudolph Gaming MD   Flaxseed Oil oil by Does Not Apply route. Rudolph Gaming MD   metoprolol tartrate (LOPRESSOR) 50 mg tablet Take 100 mg by mouth two (2) times a day. Rudolph Gaming MD   digoxin (LANOXIN) 0.125 mg tablet Take 0.0625 mg by mouth daily. Rudolph Gaming MD   colchicine 0.6 mg tablet Take 0.3 mg by mouth daily. Rudolph Gaming MD   allopurinol (ZYLOPRIM) 100 mg tablet Take 100 mg by mouth daily. Rudolph Gaming MD     There are no discontinued medications. Current Outpatient Prescriptions   Medication Sig    collagenase (SANTYL) 250 unit/gram ointment Apply 1 Tube to affected area daily.  levoFLOXacin (LEVAQUIN) 750 mg tablet Take 1 Tab by mouth every fourty-eight (48) hours.  traMADol (ULTRAM) 50 mg tablet Take 1 Tab by mouth every six (6) hours as needed. Max Daily Amount: 200 mg.    vancomycin 1,000 mg 1,000 mg IVPB 1,000 mg by IntraVENous route every twenty-four (24) hours.  dilTIAZem XR (DILACOR XR) 120 mg XR capsule Take  by mouth daily.  levothyroxine (SYNTHROID) 88 mcg tablet Take 88 mcg by mouth Daily (before breakfast).  simvastatin (ZOCOR) 20 mg tablet Take 20 mg by mouth nightly.     telmisartan (MICARDIS) 80 mg tablet Take 80 mg by mouth daily.  rivaroxaban (XARELTO) 15 mg tab tablet Take 15 mg by mouth daily (with breakfast).  furosemide (LASIX) 40 mg tablet Take 40 mg by mouth daily.  ergocalciferol (ERGOCALCIFEROL) 50,000 unit capsule Take 400 Units by mouth daily.  multivitamin, tx-iron-ca-min (THERA-M W/ IRON) 9 mg iron-400 mcg tab tablet Take 1 Tab by mouth daily.  Flaxseed Oil oil by Does Not Apply route.  metoprolol tartrate (LOPRESSOR) 50 mg tablet Take 100 mg by mouth two (2) times a day.  digoxin (LANOXIN) 0.125 mg tablet Take 0.0625 mg by mouth daily.  colchicine 0.6 mg tablet Take 0.3 mg by mouth daily.  allopurinol (ZYLOPRIM) 100 mg tablet Take 100 mg by mouth daily. No current facility-administered medications for this encounter. MICROBIOLOGY:-     Culture result:   Date Value Ref Range Status   11/26/2016 [id number]  COLONIES/mL  PROTEUS MIRABILIS     Final   11/26/2016 NO GROWTH 6 DAYS   Final   11/26/2016 AEROBIC BOTTLE  DIPHTHEROIDS     Final     GRAM STAIN   Date Value Ref Range Status   11/26/2016    Final    ANAEROBIC BOTTLE  GRAM POSITIVE RODS  CORRECTED REPORT. PREVIOUSLY REPORTED AS GRAM POSITIVE COCCI IN CLUSTERS IN ERROR.     11/26/2016    Final    SMEAR CALLED TO AND CORRECTLY REPEATED BY:  Edu Malik, CHARGE NURSE  [facility].  AT 1128,12/01/16 TO ETT.     11/26/2016   Final    CORRECTED REPORT CALLED AND FAXED TO ABAD AT [facility] ON 12/3 AT 4146 TO JCK   11/26/2016   Final    RESULTS TO ROLF Camacho [address] AT [facility] [place] [id number] AT 1520 BY SO   11/26/2016 AEROBIC BOTTLE DIPTHEROIDS  Final         Antibiotic History:    Current:     S/P:    Radiology:  [unfilled]      Review of Systems:     Constitutional: negative for chills, diaphoresis, fever, malaise/fatigue, weakness and weight loss   Skin: negative for itching and rash   HENT: negative for ear discharge, ear pain, hearing loss and sore throat   Eyes: negative for blurred vision, double vision and photophobia   Cardiovascular: negative for chest pain, claudication, leg swelling, orthopnea, paroxysmal nocturnal dyspnea   Respiratory: negative for cough, hemoptysis, shortness of breath or sputum production   Gastointestinal: negative for abdominal pain, blood in stool, constipation, diarrhea, melena, nausea and vomiting   Genitourinary: No dysuria, increased frequency, hematuria   Musculoskeletal: negative for back pain, joint pain and myalgias   Endo: negative for cold intolerance, heat intolerance, polydipsia and polyuria. Heme: negative for easy bleeding and easy bruising   Allergies: negative for hives   Neurological: negative for headaches, dizziness, focal weakness, level of consciousness, seizures and tingling   Psychiatric:  negative for depression, hallucinations and suicidal ideals       Objective:     Patient Vitals for the past 24 hrs:   Temp Pulse Resp BP SpO2   01/04/17 1105 97.1 °F (36.2 °C) 61 16 137/73 100 %       Constitutional: well developed, nourished, no distress and alert and oriented x 3   HENT: atraumatic, nose normal, normocephalic and oropharynx clear and moist   Eyes: conjunctiva normal, EOM normal and PERRL   Neck: ROM normal, supple, trachea normal and no adenopathy, thrush.  MMM   Cardiovascular: heart sounds normal, intact distal pulses, normal rate and regular rhythm   Pulmonary/Chest Wall: breath sounds normal and effort normal   Abdominal: appearance normal, bowel sounds normal, soft and No rebound, gaurding or tenderness   Genitourinary/Anorectal: deferred   Musculoskeletal: normal ROM   Neurological: awake, alert and oriented x 3, and   cranial nerves intact  muscular strength 5/5 and symmetric  reflexes normal and symmetric  sensory normal       Skin: Multiple ecchumoses           Labwork and Ancillary Studies   CBC w/Diff  Lab Results   Component Value Date/Time    WBC 7.6 11/30/2016 04:37 AM    RBC 3.00 (L) 11/30/2016 04:37 AM    HCT 28.8 (L) 11/30/2016 04:37 AM    MCV 96.0 11/30/2016 04:37 AM    MCH 32.3 11/30/2016 04:37 AM    MCHC 33.7 11/30/2016 04:37 AM    RDW 15.3 (H) 11/30/2016 04:37 AM    BANDS 5 11/16/2016 05:40 PM    MONOS 12 (H) 11/30/2016 04:37 AM    EOS 2 11/30/2016 04:37 AM    BASOS 0 11/30/2016 04:37 AM       Comprehensive Metabolic Profile  Lab Results   Component Value Date/Time     (L) 11/30/2016 04:37 AM    CO2 24 11/30/2016 04:37 AM    BUN 21 (H) 11/30/2016 04:37 AM         Ratna Prescott MD  Pager: [phone] Wound Care Staff

## 2017-01-04 NOTE — DISCHARGE INSTRUCTIONS
Wound Care Instructions    Patient: Christina Paez MRN: [id number]  SSN: [id number]     YOB: 1925  Age:89 y.o. Sex: female       Ms. Paola Valencia, Dr. Denise Khan recommends the following discharge instruction:    Offloading    []   Felt/Foam Offloading   [] Removable Cast Noris Gaitan       []   Wedge Shoe   []  Wheelchair     []   Total Contact Cast   []  Multiplodus Splint     []   Crutches   []  Surgical Shoe    Mattress:   Other:     Edema Control    []   Elevated legs as much as possible. Recommend above level of heart. [x]   Layered Wraps             []   Left      [x]   Right      []  BILATERAL          - Type:            [x]   Unna Boot       []  Multi-Layer                                   []   Two Layers     []  Three Layers   []  Four Layers     []   Tubular Bandages        []   Left      []   Right     SIZE:      []   Stockings                      []   Left      []   Right     []   Compression Pump     []   Left      []   Right    ___ millimeters of mercury  ___ millimeters of mercury for ___ minutes for ___ day(s)        Other: Please remove unna boot to left lower leg and place patient's home compression stocking. IF WOUND ON RIGHT LEG IS COMPLETELY HEALED, please remove unna boot and place patient's home compression stocking. Please inform the wound center if both unna boots are removed and compression stockings placed as home care for wound care will need to be discontinued in this case. Nutritional Supplements    []  Multi-Vitamin     []  Other:       [employer] One     []   Home Health:    []    Long Term Care:      []  DME:     Consult    []   Nutrition     []   Vascular     []   Orthotist/Pedorthotist     []   Infectious Disease     []   Lymphedema Therapist   Other:     Dressings:  Another brand of generically equivalent product may be dispensed unless specifically ordered otherwise.     Home Ulcer/wound Hygiene (cleanse with)      []   Distilled Water     []   Normal Saline     []   Wound Cleanser     []   Mild antimicrobial soap and water     []   Chlorhexidine liquid soap and water or saline     []   Other:     Apply    []   Hydrogel   []  Hypergel   []   Aquacel Ag     []   Cadexomer Iodine                 (Iodosorb)   []  Silver Alginate    []   Medihoney:     []   Collagenase:Santyl   []  Calcium Alginate   []   Collagen:     []   Plain Foam   []  Non-Adherent Contact         Layer   []   Xeroform     []   Silver foam   []   Hydrocolloid   []   Transparent Film     []   Acticoat   []   Adaptic:      Other/Specific Instructions:    Cover With    []   Dry Gauze and Roll Gauze     []   Foam and Roll Gauze     []   Dry Gauze     []   Bordered gauze:     []   Foam      []    Bordered         []   Nonbordered     []   Secure with Tape     []   Other       Helena-Ulcer Care    []   Cream     []   Lotion     []   Ointment     []   Barrier     []   Other:     Change Dressing    []   Once Daily     []   Every Other Day     []   Every 3 Days       []   Every 5 Days     []   Every 7 Days     []   Do Not Change       []   2 x per week (Mon and Thurs. )     []   3 x per week (Mon, Wed, Fri. )     []   Other          Follow-Up:   [x] Schedule follow up in 3  weeks with:   []  As Needed (PRN)     [x]   Nydia Cheng MD    []   Nilam Gomez DPM    []   Tramaine Jones DPM    []   Cecilio Carr DPM       []   Jennie Hitchcock DPM    []   Nurse Visit        Please call the wound clinic ([phone]) regarding any questions or concerns.

## 2017-01-04 NOTE — WOUND CARE
01/04/17 1111   Wound Leg Right   Date First Assessed/Time First Assessed: 11/27/16 0032   Wound Type: Trauma  Location: Leg  Orientation: Right   DRESSING STATUS Removed   DRESSING TYPE Unna boot   Non-Pressure Ulcer Partial thickness (epider/derm)   Wound Length (cm) 0.8 cm   Wound Width (cm) 0.9 cm   Wound Depth (cm) 0.1   Wound Surface area (cm^3) 0.07 cm^2   Condition of Base Pink   Condition of Edges Open   Tissue Type Pink   Tissue Type Percent Pink 100   Drainage Amount  Scant   Drainage Color Serosanguinous   Wound Odor None   Periwound Skin Condition Intact   Cleansing and Cleansing Agents  Soap and water   Dressing Type Applied Unna boot   Procedure Tolerated Well     Dr. Arnold Doan initiated maintenance antibiotics Clindamycin 3 times a week. The patient will follow up with Dr. Kae Howe in 3 weeks in clinic.

## 2017-01-17 NOTE — WOUND CARE
Patient called c/o rash after taking antibiotic for two days. Patient states \" I have a lot of allergies, you can look in my record. \" Advised patient to d/c medication, she states she has already stopped it. Informed patient that she will be contacted tomorrow once Dr. Rosario Parnell reviews her chart.

## 2017-01-25 NOTE — CONSULTS
[facility] WOUND CARE INITIAL/ follow up NOTE      Patient: Ranjan Rosenberg MRN: [id number]  SSN: [id number]    YOB: 1925  Age: 80 y.o. Sex: female      Primary Care Provider:  Adan Vuong MD  Date of Visit: 1/25/2017    Assessment : Follow up Visit Week: [address] admission last month for Pneumonia. Was at [facility] after that. Left leg cellulitis much improved. Less  Erythema. Now has stasis dermatitis Still with significant swelling  Much improved  Rt leg wound. Swelling of Rt leg down - with Unna boot    wrinkled and dry skin    Plan/ RECCOMENDATIONS:  Clindamycin 300 mg 3 times a week for Secondary prophylaxis of recurrent Cellulitis  Allergic to Penicillin and Clarithromycin  Continue Unna boot for Rt leg   Unna boot to Lt leg - once swelling reduced back to Compression stockings    Discussed at length with pt and her daughter  RTC in 3 week      FIRST VISIT on 10-28-16    ASSESSMENT:   1. Rt Lower mid leg traumatic wound. Rolled edge on the medial side   Infected with K.oxytoca - cx of 10-21. Was on Doxycycline  Did not toletrate     2. Atrial Fib  3. Gout  4. Sleep apnea  5. Multiple antibiotic Allergies    WOUND POA CONDITIONS    Wound Leg Right; Lower; Anterior (Active)   DRESSING STATUS Removed 10/28/2016 11:54 AM   DRESSING TYPE Other (Comment) 10/21/2016  1:27 PM   Wound Dimensions (length x width x depth) 2.2x4.1x0.1 10/28/2016 11:54 AM   Condition of Base Eschar 10/28/2016 11:54 AM   Condition of Edges Open 10/28/2016 11:54 AM   Tissue Type Black;Red;Yellow 10/28/2016 11:54 AM   Tissue Type Percent Black 5 % 10/21/2016  1:27 PM   Tissue Type Percent Red 90 10/28/2016 11:54 AM   Tissue Type Percent Yellow 5 10/28/2016 11:54 AM   Drainage Amount  Small  10/28/2016 11:54 AM   Drainage Color Sanguinous 10/28/2016 11:54 AM   Wound Odor None 10/28/2016 11:54 AM   Periwound Skin Condition Edematous 10/28/2016 11:54 AM   Cleansing and Cleansing Agents  Normal saline 10/28/2016 11:54 AM   Dressing Type Applied Other (Comment) 10/21/2016  1:27 PM   Procedure Tolerated Well 10/21/2016  1:27 PM   Number of days:7           PLAN /RECOMMENDATIONS:  Cristina based dressings  Keflex 500 mg Po Q 8 hrs X 7 days- Called RN at PCP office . PT has taken and tolerated this in the past  RTC one week      DEBRIDEMENT NOTE    Selective sharp instrument debridement of slough and devitilized tissue    After the benefits/risks/SE were discussed, the patient agreed to proceed. Time out was done:   * Patient was identified by name and date of birth   * Agreement on procedure being performed was verified   * Procedure site verified and marked as necessary   * Patient was positioned for comfort   * Consent was signed and verified. Site: Mid Rt L Leg    Instruments used:    []  Dermal curette  [] Blade        [x] #15  [] #10  [x] Forceps  [] Tissue nippers  [x] sterile scissors  [] Other     Anesthesia:    [x]  EMLA 2.5% cream: applied to wound beds for approximately 15minutes. []   Lidocaine 2% Topical Gel      []  Lidocaine injectable 1% with epinephrine 1:100,000    []  Lidocaine injectable 1% without epinephrine    []  Other:     []  None         [] patient is insensate due to neuropathy         [] patient declines        [] allergy to anesthetic        [] tissue for debridement is either superficial, loosely adherent and/or necrotic and denervated     After satisfactory anesthesia achieved, the wound/s was/were sharply debrided necrotic, devitalized and granulation tissue down to the SQ layer, revealing a clean and viable wound bed. Post debridement measurement was ___x same_____x___cm . Bleeding: <5mL Resolved with light focal pressure. Wounds were cleaned and irrigated with saline.      Wound care applied:   []   Hydrogell   []  Hypergel   []   Hydrofiber/Aquacel      []   Cadexomer Iodine (Iodosorb)   []  Silver Alginate    []   Medihoney:    []   Collagenase:Santyl   []  Calcium Alginate   []   Collagen: []   Foam   []  Non-Adherent Contact Layer   []   Xeroform    []   Adaptec:   []   Hydrocolloid   []   Transparent Film    []   Promogran   [x]   Cristina   []   Promogran       []  Antibiotic ointment/cream  []   Wound VAC   []   Other (see below)     Other:     []   Dry Gauze and Roll Gauze    [x]   Foam and Roll Gauze    []   Dry Gauze    []    Bordered gauze:     []   Secure with Tape    []   Other      []   Compression Wrap:          []   Unna Boot    []Multi-Layer    [x]Tubular Bandages       [place]ena-Ulcer Care    []   Cream     []   Lotion     [x]   Ointment     []   Barrier     []   Other:       The patient tolerated the procedure well with no complications. The patient left the exam room in satisfactory and stable condition. Sony Blanco MD    PROCEDURE NOTE:        FIRST VISIT DATA: on 10-28-16  Reason for consult:   Yolanda Camacho is 80 y.o. female on whom Wound Care Service is being consulted for LLleg traumatic wound    History of Present Illness: On First Visit    Patient presented to the wound center on 10-21-16 for evaluation of a right lower leg injury. She was accompanied by her daughter. The patient reported that she injured her right leg on 9/23/16 when reaching for something in her kitchen cabinet. A metal bowl fell from the top shelf and hit her shin. To date, she has been seen by dermatology and primary care. She is also receiving skilled nursing from [facility] three times per week. She changes her dressings daily, doing them herself on days when the nurse does not come. She was taking Doxycycline, prescribed by her PCP, but states it caused severe diarrhea and she discontinued it. She had not been given another antibiotic. There is an open wound on the anterior aspect of the right lower leg. Some erythema and ecchymosis is noted in the helena wound. The patient denies pain or discomfort.   A culture is taken,  Which has grown K.oxytoca She states she will see a vascular specialist next week for venous and arterial studies on her lower extremities. Her legs are edematous, the right is less swollen than the left. Patient states she has chronic swelling in her left leg and wears compression stockings when she can get them. Asked me call her PCP office to verify what antibiotics she has tolerated in the past.      Patient Active Problem List   Diagnosis Code    Wound dehiscence, traumatic injury repair T81.33XA    Traumatic open wound of right lower leg with infection S81.801A, L08.9    Cellulitis of left lower extremity L03. 116    Pneumonia J18.9    Sepsis (HCC) A41.9    Cellulitis of leg, left L03.116     Allergies   Allergen Reactions    Augmentin [Amoxicillin-Pot Clavulanate] Unknown (comments)    Biaxin [Clarithromycin] Unknown (comments)    Codeine Unknown (comments)    Doxycycline Diarrhea    Procardia [Nifedipine] Unknown (comments)    Trimox [Amoxicillin] Unknown (comments)      Past Medical History   Diagnosis Date    Atrial fibrillation ([facility] [address]     Gout     Sleep apnea      Past Surgical History   Procedure Laterality Date    Hx heart catheterization       Social History   Substance Use Topics    Smoking status: Not on file    Smokeless tobacco: Not on file    Alcohol use Not on file     Family History   Problem Relation Age of Onset    Breast Cancer Child 39    Breast Cancer Maternal Grandmother      Prior to Admission medications    Medication Sig Start Date End Date Taking? Authorizing Provider   clindamycin (CLEOCIN) 300 mg capsule Take 1 Cap by mouth Every Mon, Wed & Sun for 30 days. Indications: SKIN AND SKIN STRUCTURE ANAEROBIC INFECTION 1/4/17 2/3/17  Suki Cason MD   collagenase (SANTYL) 250 unit/gram ointment Apply 1 Tube to affected area daily. 11/30/16   Arun Butts MD   levoFLOXacin (LEVAQUIN) 750 mg tablet Take 1 Tab by mouth every fourty-eight (48) hours.  11/30/16   Arun Butts MD   traMADol (ULTRAM) 50 mg tablet Take 1 Tab by mouth every six (6) hours as needed. Max Daily Amount: 200 mg. 11/30/16   Freda Syed MD   vancomycin 1,000 mg 1,000 mg IVPB 1,000 mg by IntraVENous route every twenty-four (24) hours. 11/30/16   Freda Syed MD   dilTIAZem XR (DILACOR XR) 120 mg XR capsule Take  by mouth daily. Historical Provider   levothyroxine (SYNTHROID) 88 mcg tablet Take 88 mcg by mouth Daily (before breakfast). Rudolph Gaming MD   simvastatin (ZOCOR) 20 mg tablet Take 20 mg by mouth nightly. Rudolph Gaming MD   telmisartan (MICARDIS) 80 mg tablet Take 80 mg by mouth daily. Rudolph Gaming MD   rivaroxaban (XARELTO) 15 mg tab tablet Take 15 mg by mouth daily (with breakfast). Rudolph Gaming MD   furosemide (LASIX) 40 mg tablet Take 40 mg by mouth daily. Rudolph Gaming MD   ergocalciferol (ERGOCALCIFEROL) 50,000 unit capsule Take 400 Units by mouth daily. Rudolph Gaming MD   multivitamin, tx-iron-ca-min (THERA-M W/ IRON) 9 mg iron-400 mcg tab tablet Take 1 Tab by mouth daily. Rudolph Gaming MD   Flaxseed Oil oil by Does Not Apply route. Rudolph Gaming MD   metoprolol tartrate (LOPRESSOR) 50 mg tablet Take 100 mg by mouth two (2) times a day. Rudolph Gaming MD   digoxin (LANOXIN) 0.125 mg tablet Take 0.0625 mg by mouth daily. Rudolph Gaming MD   colchicine 0.6 mg tablet Take 0.3 mg by mouth daily. Rudolph Gaming MD   allopurinol (ZYLOPRIM) 100 mg tablet Take 100 mg by mouth daily. Rudolph Gaming MD     There are no discontinued medications. Current Outpatient Prescriptions   Medication Sig    clindamycin (CLEOCIN) 300 mg capsule Take 1 Cap by mouth Every Mon, Wed & Sun for 30 days. Indications: SKIN AND SKIN STRUCTURE ANAEROBIC INFECTION    collagenase (SANTYL) 250 unit/gram ointment Apply 1 Tube to affected area daily.  levoFLOXacin (LEVAQUIN) 750 mg tablet Take 1 Tab by mouth every fourty-eight (48) hours.  traMADol (ULTRAM) 50 mg tablet Take 1 Tab by mouth every six (6) hours as needed.  Max Daily Amount: 200 mg.   [facility] vancomycin 1,000 mg 1,000 mg IVPB 1,000 mg by IntraVENous route every twenty-four (24) hours.  dilTIAZem XR (DILACOR XR) 120 mg XR capsule Take  by mouth daily.  levothyroxine (SYNTHROID) 88 mcg tablet Take 88 mcg by mouth Daily (before breakfast).  simvastatin (ZOCOR) 20 mg tablet Take 20 mg by mouth nightly.  telmisartan (MICARDIS) 80 mg tablet Take 80 mg by mouth daily.  rivaroxaban (XARELTO) 15 mg tab tablet Take 15 mg by mouth daily (with breakfast).  furosemide (LASIX) 40 mg tablet Take 40 mg by mouth daily.  ergocalciferol (ERGOCALCIFEROL) 50,000 unit capsule Take 400 Units by mouth daily.  multivitamin, tx-iron-ca-min (THERA-M W/ IRON) 9 mg iron-400 mcg tab tablet Take 1 Tab by mouth daily.  Flaxseed Oil oil by Does Not Apply route.  metoprolol tartrate (LOPRESSOR) 50 mg tablet Take 100 mg by mouth two (2) times a day.  digoxin (LANOXIN) 0.125 mg tablet Take 0.0625 mg by mouth daily.  colchicine 0.6 mg tablet Take 0.3 mg by mouth daily.  allopurinol (ZYLOPRIM) 100 mg tablet Take 100 mg by mouth daily. No current facility-administered medications for this encounter. MICROBIOLOGY:-     Culture result:   Date Value Ref Range Status   11/26/2016 85037  COLONIES/mL  PROTEUS MIRABILIS     Final   11/26/2016 NO GROWTH 6 DAYS   Final   11/26/2016 AEROBIC BOTTLE  DIPHTHEROIDS     Final     GRAM STAIN   Date Value Ref Range Status   11/26/2016    Final    ANAEROBIC BOTTLE  GRAM POSITIVE RODS  CORRECTED REPORT. PREVIOUSLY REPORTED AS GRAM POSITIVE COCCI IN CLUSTERS IN ERROR.     11/26/2016    Final    SMEAR CALLED TO AND CORRECTLY REPEATED BY:  Edu Malik, CHARGE NURSE  [facility].  AT 1128,12/01/16 TO ETT.     11/26/2016   Final    CORRECTED REPORT CALLED AND FAXED TO ABAD AT [address] LAB ON 12/3 AT 8324 TO JCK   11/26/2016   Final    RESULTS  S Mini Ayon AT [facility] ON [id number] AT 1520 BY SO   11/26/2016 AEROBIC BOTTLE DIPTHEROIDS  Final Antibiotic History:    Current:     S/P:    Radiology:  [unfilled]      Review of Systems:     Constitutional: negative for chills, diaphoresis, fever, malaise/fatigue, weakness and weight loss   Skin: negative for itching and rash   HENT: negative for ear discharge, ear pain, hearing loss and sore throat   Eyes: negative for blurred vision, double vision and photophobia   Cardiovascular: negative for chest pain, claudication, leg swelling, orthopnea, paroxysmal nocturnal dyspnea   Respiratory: negative for cough, hemoptysis, shortness of breath or sputum production   Gastointestinal: negative for abdominal pain, blood in stool, constipation, diarrhea, melena, nausea and vomiting   Genitourinary: No dysuria, increased frequency, hematuria   Musculoskeletal: negative for back pain, joint pain and myalgias   Endo: negative for cold intolerance, heat intolerance, polydipsia and polyuria. Heme: negative for easy bleeding and easy bruising   Allergies: negative for hives   Neurological: negative for headaches, dizziness, focal weakness, level of consciousness, seizures and tingling   Psychiatric:  negative for depression, hallucinations and suicidal ideals       Objective:     No data found. Constitutional: well developed, nourished, no distress and alert and oriented x 3   HENT: atraumatic, nose normal, normocephalic and oropharynx clear and moist   Eyes: conjunctiva normal, EOM normal and PERRL   Neck: ROM normal, supple, trachea normal and no adenopathy, thrush.  MMM   Cardiovascular: heart sounds normal, intact distal pulses, normal rate and regular rhythm   Pulmonary/Chest Wall: breath sounds normal and effort normal   Abdominal: appearance normal, bowel sounds normal, soft and No rebound, gaurding or tenderness   Genitourinary/Anorectal: deferred   Musculoskeletal: normal ROM   Neurological: awake, alert and oriented x 3, and   cranial nerves intact  muscular strength 5/5 and symmetric  reflexes normal and symmetric  sensory normal       Skin: Multiple ecchumoses           Labwork and Ancillary Studies   CBC w/Diff  Lab Results   Component Value Date/Time    WBC 7.6 11/30/2016 04:37 AM    RBC 3.00 (L) 11/30/2016 04:37 AM    HCT 28.8 (L) 11/30/2016 04:37 AM    MCV 96.0 11/30/2016 04:37 AM    MCH 32.3 11/30/2016 04:37 AM    MCHC 33.7 11/30/2016 04:37 AM    RDW 15.3 (H) 11/30/2016 04:37 AM    BANDS 5 11/16/2016 05:40 PM    MONOS 12 (H) 11/30/2016 04:37 AM    EOS 2 11/30/2016 04:37 AM    BASOS 0 11/30/2016 04:37 AM       Comprehensive Metabolic Profile  Lab Results   Component Value Date/Time     (L) 11/30/2016 04:37 AM    CO2 24 11/30/2016 04:37 AM    BUN 21 (H) 11/30/2016 04:37 AM         Mariusz Rhodes MD  Pager: [phone]  [facility] Wound Care Staff

## 2017-05-01 NOTE — IP AVS SNAPSHOT
Current Discharge Medication List  
  
START taking these medications Dose & Instructions Dispensing Information Comments Morning Noon Evening Bedtime  
 cephALEXin 500 mg capsule Commonly known as:  Grabiel Fraction Your last dose was: Your next dose is:    
   
   
 Dose:  500 mg Take 1 Cap by mouth four (4) times daily for 5 days. Quantity:  20 Cap Refills:  0 CONTINUE these medications which have NOT CHANGED Dose & Instructions Dispensing Information Comments Morning Noon Evening Bedtime  
 allopurinol 100 mg tablet Commonly known as:  Heidi Betancourt Your last dose was: Your next dose is:    
   
   
 Dose:  100 mg Take 100 mg by mouth daily. Refills:  0  
     
   
   
   
  
 colchicine 0.6 mg tablet Your last dose was: Your next dose is:    
   
   
 Dose:  0.3 mg Take 0.3 mg by mouth daily. Refills:  0  
     
   
   
   
  
 digoxin 0.125 mg tablet Commonly known as:  LANOXIN Your last dose was: Your next dose is:    
   
   
 Dose:  0.0625 mg Take 0.0625 mg by mouth daily. Refills:  0  
     
   
   
   
  
 dilTIAZem  mg XR capsule Commonly known as:  DILACOR XR Your last dose was: Your next dose is: Take  by mouth daily. Refills:  0  
     
   
   
   
  
 ergocalciferol 50,000 unit capsule Commonly known as:  ERGOCALCIFEROL Your last dose was: Your next dose is:    
   
   
 Dose:  400 Units Take 400 Units by mouth daily. Refills:  0 Flaxseed Oil Oil Your last dose was: Your next dose is:    
   
   
 by Does Not Apply route. Refills:  0  
     
   
   
   
  
 furosemide 40 mg tablet Commonly known as:  LASIX Your last dose was: Your next dose is:    
   
   
 Dose:  40 mg Take 40 mg by mouth daily. Refills:  0 levothyroxine 88 mcg tablet Commonly known as:  SYNTHROID Your last dose was: Your next dose is:    
   
   
 Dose:  88 mcg Take 88 mcg by mouth Daily (before breakfast). Refills:  0  
     
   
   
   
  
 metoprolol tartrate 50 mg tablet Commonly known as:  LOPRESSOR Your last dose was: Your next dose is:    
   
   
 Dose:  100 mg Take 100 mg by mouth two (2) times a day. Refills:  0  
     
   
   
   
  
 multivitamin, tx-iron-ca-min 9 mg iron-400 mcg Tab tablet Commonly known as:  THERA-M w/ IRON Your last dose was: Your next dose is:    
   
   
 Dose:  1 Tab Take 1 Tab by mouth daily. Refills:  0 Potassium Citrate 15 mEq [place] Your last dose was: Your next dose is: Take  by mouth. Refills:  0  
     
   
   
   
  
 rivaroxaban 15 mg Tab tablet Commonly known as:  Loren Darter Your last dose was: Your next dose is:    
   
   
 Dose:  15 mg Take 15 mg by mouth daily (with breakfast). Refills:  0  
     
   
   
   
  
 simvastatin 20 mg tablet Commonly known as:  ZOCOR Your last dose was: Your next dose is:    
   
   
 Dose:  20 mg Take 20 mg by mouth nightly. Refills:  0  
     
   
   
   
  
 telmisartan 80 mg tablet Commonly known as:  Tilmon Luis Your last dose was: Your next dose is:    
   
   
 Dose:  80 mg Take 80 mg by mouth daily. Refills:  0  
     
   
   
   
  
 traMADol 50 mg tablet Commonly known as:  ULTRAM  
   
Your last dose was: Your next dose is:    
   
   
 Dose:  50 mg Take 1 Tab by mouth every six (6) hours as needed. Max Daily Amount: 200 mg. Quantity:  10 Tab Refills:  0  
     
   
   
   
  
 ULORIC 40 mg Tab tablet Generic drug:  febuxostat Your last dose was: Your next dose is:    
   
   
 Dose:  40 mg Take 40 mg by mouth daily. Refills:  0 STOP taking these medications   
 levoFLOXacin 750 mg tablet Commonly known as:  LEVAQUIN  
   
  
 vancomycin 1,000 mg 1,000 mg IVPB Where to Get Your Medications These medications were sent to [address] [place] [address] [id number] Phone:  [phone]  
  cephALEXin 500 mg capsule

## 2017-05-01 NOTE — IP AVS SNAPSHOT
[address] [address] 
[phone] Patient: Ashish Cantor MRN: [id number] You are allergic to the following Allergen Reactions Augmentin (Amoxicillin-Pot Clavulanate) Unknown (comments) Biaxin (Clarithromycin) Unknown (comments) Clindamycin Rash Codeine Unknown (comments) Doxycycline Diarrhea Procardia (Nifedipine) Unknown (comments) Trimox (Amoxicillin) Unknown (comments) Recent Documentation Height Weight BMI Smoking Status 1.499 m 65.8 kg 29.29 kg/m2 Never Assessed Unresulted Labs Order Current Status CULTURE, BLOOD Preliminary result CULTURE, BLOOD Preliminary result CULTURE, BODY FLUID W GRAM STAIN Preliminary result Emergency Contacts Name Discharge Info Relation Home Work Mobile [facility] DISCHARGE CAREGIVER [3] Daughter [21] [phone] About your hospitalization You were admitted on:  May 2, 2017 You last received care in the:  [address] 2 [facility] You were discharged on: May 4, 2017 Unit phone number:  [phone] Why you were hospitalized Your primary diagnosis was:  Not on File Your diagnoses also included:  Cellulitis Of Right Leg Providers Seen During Your Hospitalizations Provider Role Specialty Primary office phone Gómez Valverde MD Attending Provider Emergency Medicine [phone] Erlinda Archer MD Attending Provider Emergency Medicine [phone] Julio Mejia MD Attending Provider [facility] [phone] Melisa Aguilera DO Attending Provider Internal Medicine [phone] Your Primary Care Physician (PCP) Primary Care Physician Office Phone Office Fax [address], [address] [phone] Follow-up Information Follow up With Details Comments Contact Info MD [address] 1100 [facility] [address] [place] [address] Current Discharge Medication List  
  
START taking these medications Dose & Instructions Dispensing Information Comments Morning Noon Evening Bedtime  
 cephALEXin 500 mg capsule Commonly known as:  Ortiz Gayatri Your last dose was: Your next dose is:    
   
   
 Dose:  500 mg Take 1 Cap by mouth four (4) times daily for 5 days. Quantity:  20 Cap Refills:  0 CONTINUE these medications which have NOT CHANGED Dose & Instructions Dispensing Information Comments Morning Noon Evening Bedtime  
 allopurinol 100 mg tablet Commonly known as:  Asim Mera Your last dose was: Your next dose is:    
   
   
 Dose:  100 mg Take 100 mg by mouth daily. Refills:  0  
     
   
   
   
  
 colchicine 0.6 mg tablet Your last dose was: Your next dose is:    
   
   
 Dose:  0.3 mg Take 0.3 mg by mouth daily. Refills:  0  
     
   
   
   
  
 digoxin 0.125 mg tablet Commonly known as:  LANOXIN Your last dose was: Your next dose is:    
   
   
 Dose:  0.0625 mg Take 0.0625 mg by mouth daily. Refills:  0  
     
   
   
   
  
 dilTIAZem  mg XR capsule Commonly known as:  DILACOR XR Your last dose was: Your next dose is: Take  by mouth daily. Refills:  0  
     
   
   
   
  
 ergocalciferol 50,000 unit capsule Commonly known as:  ERGOCALCIFEROL Your last dose was: Your next dose is:    
   
   
 Dose:  400 Units Take 400 Units by mouth daily. Refills:  0 Flaxseed Oil Oil Your last dose was: Your next dose is:    
   
   
 by Does Not Apply route. Refills:  0  
     
   
   
   
  
 furosemide 40 mg tablet Commonly known as:  LASIX Your last dose was: Your next dose is: Dose:  40 mg Take 40 mg by mouth daily. Refills:  0  
     
   
   
   
  
 levothyroxine 88 mcg tablet Commonly known as:  SYNTHROID Your last dose was: Your next dose is:    
   
   
 Dose:  88 mcg Take 88 mcg by mouth Daily (before breakfast). Refills:  0  
     
   
   
   
  
 metoprolol tartrate 50 mg tablet Commonly known as:  LOPRESSOR Your last dose was: Your next dose is:    
   
   
 Dose:  100 mg Take 100 mg by mouth two (2) times a day. Refills:  0  
     
   
   
   
  
 multivitamin, tx-iron-ca-min 9 mg iron-400 mcg Tab tablet Commonly known as:  THERA-M w/ IRON Your last dose was: Your next dose is:    
   
   
 Dose:  1 Tab Take 1 Tab by mouth daily. Refills:  0 Potassium Citrate 15 mEq Allison Anguiano Your last dose was: Your next dose is: Take  by mouth. Refills:  0  
     
   
   
   
  
 rivaroxaban 15 mg Tab tablet Commonly known as:  Jose D Alexander Your last dose was: Your next dose is:    
   
   
 Dose:  15 mg Take 15 mg by mouth daily (with breakfast). Refills:  0  
     
   
   
   
  
 simvastatin 20 mg tablet Commonly known as:  ZOCOR Your last dose was: Your next dose is:    
   
   
 Dose:  20 mg Take 20 mg by mouth nightly. Refills:  0  
     
   
   
   
  
 telmisartan 80 mg tablet Commonly known as:  Chris Salen Your last dose was: Your next dose is:    
   
   
 Dose:  80 mg Take 80 mg by mouth daily. Refills:  0  
     
   
   
   
  
 traMADol 50 mg tablet Commonly known as:  ULTRAM  
   
Your last dose was: Your next dose is:    
   
   
 Dose:  50 mg Take 1 Tab by mouth every six (6) hours as needed. Max Daily Amount: 200 mg. Quantity:  10 Tab Refills:  0  
     
   
   
   
  
 ULORIC 40 mg Tab tablet Generic drug:  febuxostat Your last dose was: Your next dose is: Dose:  40 mg Take 40 mg by mouth daily. Refills:  0 STOP taking these medications   
 levoFLOXacin 750 mg tablet Commonly known as:  LEVAQUIN  
   
  
 vancomycin 1,000 mg 1,000 mg IVPB Where to Get Your Medications These medications were sent to [address] [place], [place] [address]haresh Guerrero [address], [facility] [address] Phone:  [phone]  
  cephALEXin 500 mg capsule Discharge Instructions DISCHARGE SUMMARY from Nurse The following personal items are in your possession at time of discharge: 
 
Dental Appliances: None Visual Aid: Glasses, With patient Home Medications: None Jewelry: Camilo Hand Clothing: Footwear, Pants, Shirt, Socks, Undergarments Other Valuables: Elizabeth Miles PATIENT INSTRUCTIONS: 
 
 
F-face looks uneven A-arms unable to move or move unevenly S-speech slurred or non-existent T-time-call 911 as soon as signs and symptoms begin-DO NOT go Back to bed or wait to see if you get better-TIME IS BRAIN. Warning Signs of HEART ATTACK Call 911 if you have these symptoms: 
? Chest discomfort. Most heart attacks involve discomfort in the center of the chest that lasts more than a few minutes, or that goes away and comes back. It can feel like uncomfortable pressure, squeezing, fullness, or pain. ? Discomfort in other areas of the upper body. Symptoms can include pain or discomfort in one or both arms, the back, neck, jaw, or stomach. ? Shortness of breath with or without chest discomfort. ? Other signs may include breaking out in a cold sweat, nausea, or lightheadedness. Don't wait more than five minutes to call [address]! Fast action can save your life. Calling 911 is almost always the fastest way to get lifesaving treatment. Emergency Medical Services staff can begin treatment when they arrive  up to an hour sooner than if someone gets to the hospital by car. The discharge information has been reviewed with the patient. The spouse verbalized understanding. Discharge medications reviewed with the patient and appropriate educational materials and side effects teaching were provided. Cellulitis: Care Instructions Your Care Instructions Cellulitis is a skin infection. It often occurs after a break in the skin from a scrape, cut, bite, or puncture, or after a rash. The doctor has checked you carefully, but problems can develop later. If you notice any problems or new symptoms, get medical treatment right away. Follow-up care is a key part of your treatment and safety. Be sure to make and go to all appointments, and call your doctor if you are having problems. It's also a good idea to know your test results and keep a list of the medicines you take. How can you care for yourself at home? · Take your antibiotics as directed. Do not stop taking them just because you feel better. You need to take the full course of antibiotics. · Prop up the infected area on pillows to reduce pain and swelling. Try to keep the area above the level of your heart as often as you can. · If your doctor told you how to care for your wound, follow your doctor's instructions. If you did not get instructions, follow this general advice: ¨ Wash the wound with clean water 2 times a day. Don't use hydrogen peroxide or alcohol, which can slow healing. ¨ You may cover the wound with a thin layer of petroleum jelly, such as Vaseline, and a nonstick bandage. ¨ Apply more petroleum jelly and replace the bandage as needed. · Be safe with medicines. Take pain medicines exactly as directed. ¨ If the doctor gave you a prescription medicine for pain, take it as prescribed. ¨ If you are not taking a prescription pain medicine, ask your doctor if you can take an over-the-counter medicine. To prevent cellulitis in the future · Try to prevent cuts, scrapes, or other injuries to your skin. Cellulitis most often occurs where there is a break in the skin. · If you get a scrape, cut, mild burn, or bite, wash the wound with clean water as soon as you can to help avoid infection. Don't use hydrogen peroxide or alcohol, which can slow healing. · If you have swelling in your legs (edema), support stockings and good skin care may help prevent leg sores and cellulitis. · Take care of your feet, especially if you have diabetes or other conditions that increase the risk of infection. Wear shoes and socks. Do not go barefoot. If you have athlete's foot or other skin problems on your feet, talk to your doctor about how to treat them. When should you call for help? Call your doctor now or seek immediate medical care if: 
· You have signs that your infection is getting worse, such as: 
¨ Increased pain, swelling, warmth, or redness. ¨ Red streaks leading from the area. ¨ Pus draining from the area. ¨ A fever. · You get a rash. Watch closely for changes in your health, and be sure to contact your doctor if: 
· You are not getting better after 1 day (24 hours). · You do not get better as expected. Where can you learn more? Go to http://david-luanne.info/. Favian Gonzales in the search box to learn more about \"Cellulitis: Care Instructions. \" Current as of: October 13, 2016 Content Version: 11.2 © 5959-2147 [employer].  Care instructions adapted under license by [employer] (which disclaims liability or warranty for this information). If you have questions about a medical condition or this instruction, always ask your healthcare professional. [address] any warranty or liability for your use of this information. Patient armband removed and shredded. Discharge Orders None Introducing [facility] SERVICES! Rosi Steel introduces Eponym patient portal. Now you can access parts of your medical record, email your doctor's office, and request medication refills online. 1. In your internet browser, go to [email or web address] 2. Click on the First Time User? Click Here link in the Sign In box. You will see the New Member Sign Up page. 3. Enter your Eponym Access Code exactly as it appears below. You will not need to use this code after youve completed the sign-up process. If you do not sign up before the expiration date, you must request a new code. · Eponym Access Code: [email or web address] Expires: 7/30/2017 10:50 PM 
 
4. Enter the last four digits of your Social Security Number (xxxx) and Date of Birth (mm/dd/yyyy) as indicated and click Submit. You will be taken to the next sign-up page. 5. Create a Eponym ID. This will be your Eponym login ID and cannot be changed, so think of one that is secure and easy to remember. 6. Create a Eponym password. You can change your password at any time. 7. Enter your Password Reset Question and Answer. This can be used at a later time if you forget your password. 8. Enter your e-mail address. You will receive e-mail notification when new information is available in [address] 9. Click Sign Up. You can now view and download portions of your medical record. 10. Click the Download Summary menu link to download a portable copy of your medical information. If you have questions, please visit the Frequently Asked Questions section of the Eponym website.  Remember, Eponym is NOT to be used for urgent needs. For medical emergencies, dial 911. Now available from your iPhone and Android! General Information Please provide this summary of care documentation to your next provider. Patient Signature:  ____________________________________________________________ Date:  ____________________________________________________________  
  
Mónica Brought Provider Signature:  ____________________________________________________________ Date:  ____________________________________________________________

## 2017-05-02 PROBLEM — L03.115 CELLULITIS OF RIGHT LEG: Status: ACTIVE | Noted: 2017-01-01

## 2017-05-02 NOTE — PROGRESS NOTES
0400: admit pt from ED, pt arrived via stretcher. Received pt alert and oriented x 4. With right leg redness, pt has 4+ pitting edema on bilateral lower extremities, right knee wrapped with dressing. No s/s of distress. Instructed pt to use call bell whenever she needs help, pt verbalized understanding. 9352: Paged Dr. Tameka Lee. 0505: received a call back from Dr. Tameka Lee, asked for a diet order, she ordered a cardiac diet for this patient, also notified her about pt's elevated BP 's - 190's, she ordered a one time dose of hydralazine 10 mg IV. Asked her if it's ok not to put SCD's on this pt because her legs are very swollen, she said to discontinue the order for SCD's and that it's ok not to put them because pt's INR is high. Read back provided. 0800: Bedside and Verbal shift change report given to Sulma Snyder (oncoming nurse) by Ravi Boo   (offgoing nurse). Report included the following information SBAR, Kardex, Intake/Output and MAR.

## 2017-05-02 NOTE — PROGRESS NOTES
conducted an initial consultation and Spiritual Assessment for Yolanda Camacho, who is a 80 y.o.,female. Patients Primary Language is: [place]. According to the patients EMR Jainism Affiliation is: Church. The reason the Patient came to the hospital is:   Patient Active Problem List    Diagnosis Date Noted    Cellulitis of right leg 05/02/2017    Pneumonia 11/26/2016    Sepsis ([address] 11/26/2016    Cellulitis of leg, left 11/26/2016    Cellulitis of left lower extremity 11/18/2016    Wound dehiscence, traumatic injury repair 10/28/2016    Traumatic open wound of right lower leg with infection 10/28/2016        The  provided the following Interventions:  Initiated a relationship of care and support with patient in room 2116 at around 1003 this morning. Listened empathically as she talked about being here and her hopes for a quick recovery and then back home. Patient is very open and friendly talking. Provided information about Spiritual Care Services. Offered prayer and assurance of continued prayers on patients behalf. The following outcomes were achievher medical narrative and spiritual journey/beliefs. Patient processed feeling about current hospitalization. Patient expressed gratitude for pastoral care visit. Assessment:  Patient does not have any Evangelical/cultural needs that will affect patients preferences in health care. There are no further spiritual or Evangelical issues which require Spiritual Care Services interventions at this time. Plan:  Chaplains will continue to follow and will provide pastoral care on an as needed/requested basis    . Flakita Sal   Spiritual Care   [phone]

## 2017-05-02 NOTE — PROCEDURES
[facility]  *** FINAL REPORT ***    Name: Rosa Barr  MRN: [id number]    Inpatient  : 1925  HIS Order #: [id number]  [address] [facility] Visit #: [id number]  Date: 02 May 2017    TYPE OF TEST: Peripheral Venous Testing    REASON FOR TEST  Pain in limb, Limb swelling, Cellulitis    Right Leg:-  Deep venous thrombosis:           No  Superficial venous thrombosis:    No  Deep venous insufficiency:        No  Superficial venous insufficiency: Not examined      INTERPRETATION/FINDINGS  Duplex images were obtained using 2-D gray scale, color flow, and  spectral Doppler analysis. Right leg :  1. Deep vein(s) visualized include the common femoral, deep femoral,  proximal femoral, mid femoral, distal femoral, popliteal(above knee),  popliteal(fossa), popliteal(below knee), posterior tibial and peroneal   veins. 2. No evidence of deep venous thrombosis detected in the veins  visualized. 3. No evidence of deep vein thrombosis in the contralateral common  femoral vein. 4. No evidence of superficial thrombosis detected. 5. No evidence of reflux detected in the deep veins visualized. 6. Pulsatile flow detected consistent with increased central venous  pressure. ADDITIONAL COMMENTS    I have personally reviewed the data relevant to the interpretation of  this  study. TECHNOLOGIST: Hector Rodriguez.  Gato Garcia  Signed: 2017 02:42 PM    PHYSICIAN: Russell Wells MD  Signed: 2017 03:48 PM

## 2017-05-02 NOTE — PROGRESS NOTES
[facility] Dosing Services     Pharmacy dosing ABX empirically for treatment right leg cellulitis. Loading dose: Initiated total bolus of 1.5gm vancomycin IV x1    Day of Therapy: 0    Labs:   Bun/Serum Creatinine - 53 mg/dl / 1.4 mg/dl  CrCl - 27.2 ml/min  WBC - 10.6    Cultures:   Blood x2 - pending  Body fluid culture - pending    Other Abx:  Ceftriaxone    Plan:   Goal trough 15-20. Initiate maintenance dose of 1gm IV q24h. Check Vanco-trough on 5/4. Pharmacy to follow and will make changes to dose and/or frequency based on clinical status. Thanks for the consult.

## 2017-05-02 NOTE — PROGRESS NOTES
Nutrition initial assessment/Plan of care      RECOMMENDATIONS:   1. Low Na (2 g) diet  2. Monitor weight and PO intake  3. RD to follow     GOALS:   1. PO intake meets >75% of protein/calorie needs by 5/9  2. Weight Maintenance (+/- 1-2 lb) by 5/9        ASSESSMENT:   Per BMI of 29.3, weight is in the overweight classification. PO intake is adequate. Labs noted. BG in the last 24 hours: 107-116. Nutrition recommendations listed. RD to follow. Nutrition Diagnoses:   None at this time    Nutrition Risk:  [] High  [] Moderate [x]  Low    SUBJECTIVE/OBJECTIVE:   Patient admitted with right leg cellulitis. Patient with past medical history of gout, sleep apnea and atrial fibrillation. No known food allergies. Patient reports a good appetite. No known food allergy. She denies N/V/D/C. She reports UBW around 145 lb. Information Obtained from:    [x] Chart Review   [x] Patient   [] Family/Caregiver   [x] Nurse/Physician   [] Interdisciplinary Meeting/Rounds    Diet: Cardiac  Medications: [x] Reviewed (Lasix, MVI)    Allergies: [x] Reviewed   Encounter Diagnoses     ICD-10-CM ICD-9-CM   1. Cellulitis of right leg L03.115 682.6   2. Knee effusion, right M25.461 719.06   3.  Coagulopathy ([facility] D68.9 286.9     Past Medical History:   Diagnosis Date    Atrial fibrillation ([facility]     Gout     Sleep apnea       Labs:    Lab Results   Component Value Date/Time    Sodium 141 05/02/2017 01:00 AM    Potassium 4.6 05/02/2017 01:00 AM    Chloride 106 05/02/2017 01:00 AM    CO2 26 05/02/2017 01:00 AM    Anion gap 9 05/02/2017 01:00 AM    Glucose 107 05/02/2017 01:00 AM    BUN 55 05/02/2017 01:00 AM    Creatinine 1.76 05/02/2017 01:00 AM    Calcium 8.9 05/02/2017 01:00 AM    Phosphorus 3.8 04/28/2010 11:30 AM    Albumin 2.4 11/28/2016 04:15 AM     Anthropometrics: BMI (calculated): 29.3  Last 3 Recorded Weights in this Encounter    05/01/17 8333   Weight: 65.8 kg (145 lb)      Ht Readings from Last 1 Encounters:   05/01/17 4' 11\" (1.499 m)       IBW: 95 lb %IBW: 153% UBW: 145 lb %UBW: 100%   [] Weight Loss [] Weight Gain [x] Weight Stable    Estimated Nutrition Needs: [x] MSJ  [] Other:  Calories: 0773-2691 kcal Based on:   [x] Actual BW    Protein:   65-80 g Based on:   [x] Actual BW    Fluid:       2428-5133 ml Based on:   [x] Actual BW      [x] No Cultural, Temple or ethnic dietary need identified.     [] Cultural, Temple and ethnic food preferences identified and addressed     Wt Status:  [] Normal (18.6 - 24.9) [] Underweight (<18.5) [x] Overweight (25 - 29.9) [] Mild Obesity (30 - 34.9)  [] Moderate Obesity (35 - 39.9) [] Morbid Obesity (40+)   [] Moderate Malnutrition [] Severe Malnutrition in the context of :     Nutrition Problems Identified:   [] Suboptimal PO intake   [] Food Allergies  [] Difficulty chewing/swallowing/poor dentition  [] Constipation/Diarrhea   [] Nausea/Vomiting   [x] None  [] Other:     Plan:   [x] Therapeutic Diet  []  Obtained/adjusted food preferences/tolerances and/or snacks options   []  Supplements added   [] Occupational therapy following for feeding techniques  []  HS snack added   []  Modify diet texture   []  Modify diet for food allergies   []  Educate patient   []  Assist with menu selection   [x]  Monitor PO intake on meal rounds   [x]  Continue inpatient monitoring and intervention   []  Participated in discharge planning/Interdisciplinary rounds/Team meetings   []  Other:     Education Needs:   [] Not appropriate for teaching at this time due to:   [x] Identified and addressed    Nutrition Monitoring and Evaluation:  [x] Continue ongoing monitoring and intervention  [] Other    Joanne Calix, [address] [place] [address]  Pager: [phone]

## 2017-05-02 NOTE — H&P
[address] [place]    Name:  Gerardo Tello  MR#:  [id number]  :  1925  Account #:  [de-identified]  Date of Adm:  2017      CHIEF COMPLAINT: Right lower extremity swelling, redness and pain. HISTORY OF PRESENT ILLNESS: The patient is a 19-year-old  female who presents to the ED complaining of pain and swelling and  redness in her right lower extremity. The patient has a history of knee  injections with steroids per Orthopedics. She states that her last  injection was less than a month ago. At the site of the injection the  patient began having some leaking of clear fluid and redness that  started earlier in the day. The patient states that with a history of the  knee injections this has happened in the past. She denies any fever,  nausea, vomiting, diarrhea. In addition to the swelling, she adds that  she has a history of bilateral lower extremity edema, in which she took  Lasix in the past and currently is not taking Lasix. While in the ER, the patient was found to have an elevated INR in  addition to the redness and swelling of the right leg. IV antibiotics were  initiated at that time. The Xarelto will be held due to supratherapeutic  INR. The patient admitted for further management. Past Medical History:   Diagnosis Date    Atrial fibrillation ([address]     Gout     Sleep apnea        Past Surgical History:   Procedure Laterality Date    HX HEART CATHETERIZATION         Social History     Social History    Marital status:      Spouse name: N/A    Number of children: N/A    Years of education: N/A     Occupational History    Not on file.      Social History Main Topics    Smoking status: Not on file    Smokeless tobacco: Not on file    Alcohol use Not on file    Drug use: Not on file    Sexual activity: Not on file     Other Topics Concern    Not on file     Social History Narrative       Family History   Problem Relation Age of Onset    Breast Cancer Child 39    Breast Cancer Maternal Grandmother        Allergies   Allergen Reactions    Augmentin [Amoxicillin-Pot Clavulanate] Unknown (comments)    Biaxin [Clarithromycin] Unknown (comments)    Clindamycin Rash    Codeine Unknown (comments)    Doxycycline Diarrhea    Procardia [Nifedipine] Unknown (comments)    Trimox [Amoxicillin] Unknown (comments)       Review of Systems:  Positive in bold. CONST: Fever, weight loss, fatigue or chills  GI: Nausea, vomiting, abdominal pain, change in bowel habits, hematochezia, melena, and GERD   INTEG: Dermatitis, abnormal moles  HEENT: Recent changes in vision, vertigo, epistaxis, dysphagia, hoarseness  CV: Chest pain, palpitations, HTN, edema and varicosities  RESP: Cough, shortness of breath, wheezing, hemoptysis, snoring. : Hematuria, dysuria, frequency, urgency, retention, incontinence   MS: Weakness, joint pain and arthritis  ENDO: Diabetes, thyroid disease, polyuria, polydipsia, polyphagia, poor wound healing, heat intolerance, cold intolerance  LYMPH/HEME: Anemia, bruising and history of blood transfusions  NEURO: Dizziness, headache, fainting, seizures and stroke  PSYCH: Anxiety , depression   SKIN: redness, swelling  Physical Exam:      Visit Vitals    /67 (BP 1 Location: Right arm, BP Patient Position: Sitting)    Pulse (!) 51    Temp 98.6 °F (37 °C)    Resp 17    Ht 4' 11\" (1.499 m)    Wt 65.8 kg (145 lb)    SpO2 100%    BMI 29.29 kg/m2       Physical Exam:  Gen:  No distress, alert, awake and oriented x 4  HEENT:  Normal cephalic atraumatic, extra-occular movements are intact. Neck:  Supple, No JVD  Lungs:  Clear bilaterally, no wheeze, no rales, normal effort  Cardiovascular:  Regular Rate and Rhythm, normal S1 and S2, no audible murmur. Capillary refill: < 3 seconds. Abdomen:  Soft, non tender, non distended, normal bowel sounds, no guarding.   Extremities:  Well perfused, no cyanosis,  Bilateral lower extremity edema, worsened on right lower extremity with redness extending up the medial and lateral thigh. Skin is taut  Neurological:  Awake and alert, CN's are intact, normal strength throughout extremities  Skin:  No rashes or moles. Turgor and color normal  Mental Status:  Normal thought process, does not appear anxious      Laboratory Studies: All lab results for the last 24 hours reviewed. Assessment/Plan     Active Problems:   cellulitis right leg      PLAN:    Infectious: cellulitis right leg   Continue IV antibiotics- rocephin, vanco    CV: HTN, AFib, CHF. INR supra therapeutic while patient on xarelto. Although not common, it may be contributing factor. Will hold xarelto and check INR daily. Restart if INR begins to decrease. Continue antihypertensive medication as per home   Continue lasix   Monitor vitals as per unit    Misc:  FULL CODE   Physical therapy to evaluate and treat   Fall precautions   Ambulate with assistance. Monitor intake and output   Monitor vitals as per unit   Replace electrolytes as needed. Follow up am labs.             MD LILIANE Mccormack / DARRYL  D:  05/02/2017   03:42  T:  05/02/2017   06:33  Job #:  [id number]

## 2017-05-02 NOTE — PROGRESS NOTES
[facility]   Discharge Planning/ Assessment    Reasons for Intervention: Chart reviewed and pt verified demographics. He has Medicare a and b, and [employer]. She lives alone, hires a person to [employer] and do laundry at home. Had [facility] in past wound dressing care. He has a cane wheel chair, 2 rolling walkers. Her dtr Raisa Allen [phone] will participate in dc process. Plan home  With [facility] dressing changes if needed.     High Risk Criteria  [] Yes  [x]No   Physician Referral  [] Yes  [x]No        Date    Nursing Referral  [] Yes  [x]No        Date    Patient/Family Request  [] Yes  [x]No        Date       Resources:    Medicare  [x] Yes  [x]No   Medicaid  [] Yes  [x]No   No Resources  [] Yes  [x]No   Private Insurance  [] Yes  [x]No    Name/Phone Number    Other  [x] Yes  []No        (i.e. Workman's Comp)         Prior Services:    Prior Services  [x] Yes  []No   Home Health  [x] Yes  []No   [address]  [] Yes  [x]No        [facility] of [address] Program  [] Yes  [x]No       Meals on Wheels  [] Yes  [x]No   Office on Aging  [] Yes  [x]No   Transportation Services  [] Yes  [x]No   Nursing Home  [] Yes  [x]No        Nursing Home Name    [facility]  [] Yes  [x]No        [address] Name    Other       Information Source:      Information obtained from  [x] Patient  [] Parent   [] [address]  [] Child  [] Spouse   [] Significant Other/Partner   [] Friend      [] EMS    [] Nursing Home Chart          [] Other:   Chart Review  [x] Yes  []No     Family/Support System:    Patient lives with  [x] Alone    [] Spouse   [] Significant Other  [] Children  [] Caretaker   [] Parent  [] Sibling     [] Other       Other Support System:    Is the patient responsible for care of others  [] Yes  [x]No   Information of person caring for patient on  discharge    Managers financial affairs independently  [x] Yes  []No   If no, explain:      Status Prior to Admission:    Mental Status  [x] Awake  [] Alert  [] Oriented  [] Quiet/Calm [] Lethargic/Sedated   [] Disoriented  [] Restless/Anxious  [] Combative   Personal Care  [] Dependent  [x] Independent Personal Care  [] Requires Assistance   Meal Preparation Ability  [] Independent   [] Standby Assistance   [] Minimal Assistance   [] Moderate Assistance  [x] Maximum Assistance     [] Total Assistance   Chores  [] Independent with Chores   [] N/A Nursing Home Resident   [x] Requires Assistance   Bowel/Bladder  [x] Continent  [] Catheter  [] Incontinent  [] Ostomy Self-Care    [] Urine Diversion Self-Care  [] Maximum Assistance     [] Total Assistance   Number of Persons needed for assistance    DME at home  [] [employer], Idamae Massiel  [] [employer], Straight   [] Commode    [] Bathroom/Grab Bars  [] Hospital Bed  [] Nebulizer  [] Oxygen           [] Raised Toilet Seat  [] Shower Chair  [] Side Rails for Bed   [] Tub Transfer Bench   [] July Grills  [] Lucy Sy, Standard      [] Other:   Vendor      Treatment Presently Receiving:    Current Treatments  [] Chemotherapy  [] Dialysis  [] Insulin  [] IVAB [] IVF   [] O2  [] PCA   [] PT   [] RT   [] Tube Feedings   [] Wound Care     Psychosocial Evaluation:    Verbalized Knowledge of Disease Process  [] Patient  []Family   Coping with Disease Process  [] Patient  []Family   Requires Further Counseling Coping with Disease Process  [] Patient  []Family     Identified Projected Needs:    Home Health Aid  [] Yes  [x]No   Transportation  [] Yes  [x]No   Education  [] Yes  [x]No        Specific Education     Financial Counseling  [] Yes  [x]No   Inability to Care for Self/Will Require 24 hour care  [] Yes  [x]No   Pain Management  [] Yes  [x]No   Home Infusion Therapy  [] Yes  [x]No   Oxygen Therapy  [] Yes  [x]No   DME  [] Yes  [x]No   Long Term Care Placement  [] Yes  [x]No   Rehab  [] Yes  [x]No   Physical Therapy  [] Yes  [x]No   Needs Anticipated At This Time  [] Yes  [x]No     Intra-Hospital Referral:    [facility]  [] Yes  [x]No     [] Yes  [x]No   Patient Representative  [] Yes  [x]No   Staff for Teaching Needs  [] Yes  [x]No   Specialty Teaching Needs     Diabetic Educator  [] Yes  [x]No   Referral for Diabetic Educator Needed  [] Yes  [x]No  If Yes, place order for Nutritionist or Diabetic Consult     Tentative Discharge Plan:    Home with No Services  [x] Yes  []No   Home with [address]  [] Yes  [x]No        If Yes, specify type    Home Care Program  [] Yes  [x]No        If Yes, specify type    Meals on Wheels  [] Yes  [x]No   Office of Aging  [] Yes  [x]No   NHP  [] Yes  [x]No   Return to the Nursing Home  [] Yes  [x]No   Rehab Therapy  [] Yes  [x]No   Acute Rehab  [] Yes  [x]No   Subacute Rehab  [] Yes  [x]No   Private Care  [] Yes  [x]No   Substance Abuse Referral  [] Yes  [x]No   Transportation  [] Yes  [x]No   Chore Service  [] Yes  [x]No   Inpatient Hospice  [] Yes  [x]No   OP RT  [] Yes  [x] No   OP Hemo  [] Yes  [x] No   OP PT  [] Yes  [x]No   Support Group  [] Yes  [x]No   Reach to Recovery  [] Yes  [x]No   OP Oncology Clinic  [] Yes  [x]No   Clinic Appointment  [] Yes  [x]No   DME  [] Yes  [x]No   Comments    Name of D/C Planner or  Given to Patient or Family Ian Velásquez. Adriel Pappas RN   Phone Number         [phone]   Date 05/02/17   Time    If you are discharged home, whom do you designate to participate in your discharge plan and receive any information needed?      Enter name of designee dtr        Phone # of designee         Address of designee         Updated         Patient refused to designate any           individual

## 2017-05-02 NOTE — ED TRIAGE NOTES
Patient states she had kenalog shot to right knee on April 13th. Since then she has been having clear drainage coming out of injection since. Patient also has redness from knee that spreads up her thigh.

## 2017-05-02 NOTE — PROGRESS NOTES
0805: Orders received and chart reviewed. Patient scheduled for lower extremity dopplers for right leg DVT suspicion. Most recent INR was 4.1. Hold PT eval at this time. Will re-attempt pending imaging results.      Thank you,   Meli Rosario, PT, DPT

## 2017-05-02 NOTE — ACP (ADVANCE CARE PLANNING)
Patient has designated _______dtr________________ to participate in his/her discharge plan and to receive any needed information.      Name: Vanessa Matthews  Address:  Phone [phone]

## 2017-05-02 NOTE — ED NOTES
Patient reports clear drainage from right knee since injection of Kenalog. Today developed redness along medial right leg extending from right knee to mid thigh.

## 2017-05-02 NOTE — ED NOTES
Provider in triage ---- 1) right thigh redness similar to prior cellulitis    2) injection site right knee still leaking since 4/13/17 following kenalog shot. Concern for cellulitis. Doubt septic joint.

## 2017-05-02 NOTE — PROGRESS NOTES
Assumed care of patient. Patient is AOx4, resting in bed, in stable condition. Patient does have some right knee pain and was given some pain medication earlier.

## 2017-05-02 NOTE — INTERDISCIPLINARY ROUNDS
IDR Summary      Patient: Sugey Kam MRN: [id number]    Age: 80 y.o.  : 1925     Admit Diagnosis: Cellulitis of right leg      Problems pertinent to hospital stay:     Consults:Case Management     Testing due for patient today?  NO    Nutrition plan:Yes     Mobility needs: Yes      Lines/Tubes:   IV: YES   Needed: YES  Varela: NO  Needed:NO  Central Line: NO Needed: NO      VTE Prophylaxis: Not needed          Care Management:  Discharge plan: Home with [facility]   PCP: Billie Joseph MD    : NO  Financial concerns:No   Interventions:       LOS: 0 days     Expected days until discharge: 1-2 days        Signed:     ASIM Armendariz-BC  [address]  Hospitalist Division  Pager:  [phone]  Office:  [phone]

## 2017-05-02 NOTE — ED PROVIDER NOTES
HPI Comments: Yolanda Serrato is a 80 y.o. Female with c/o drainage from right knee s/p steroid injection few days ago with new onset redness to right thigh that started today. No fcs, nvd, cp, sob, cough, recent admissions. H/o cellulitis, pneumonia admission last fall. Swelling constant,. Only mild pain worse with ambulation    The history is provided by the patient. Past Medical History:   Diagnosis Date    Atrial fibrillation ([address]     Gout     Sleep apnea        Past Surgical History:   Procedure Laterality Date    HX HEART CATHETERIZATION           Family History:   Problem Relation Age of Onset    Breast Cancer Child 39    Breast Cancer Maternal Grandmother        Social History     Social History    Marital status:      Spouse name: N/A    Number of children: N/A    Years of education: N/A     Occupational History    Not on file. Social History Main Topics    Smoking status: Not on file    Smokeless tobacco: Not on file    Alcohol use Not on file    Drug use: Not on file    Sexual activity: Not on file     Other Topics Concern    Not on file     Social History Narrative         ALLERGIES: Augmentin [amoxicillin-pot clavulanate]; Biaxin [clarithromycin]; Clindamycin; Codeine; Doxycycline; Procardia [nifedipine]; and Trimox [amoxicillin]    Review of Systems   Constitutional: Negative for fever. HENT: Negative for sore throat. Respiratory: Negative for cough and shortness of breath. Cardiovascular: Positive for leg swelling. Negative for chest pain. Gastrointestinal: Negative for abdominal pain, nausea and vomiting. Genitourinary: Negative for difficulty urinating. Musculoskeletal: Positive for gait problem and joint swelling. Skin: Positive for rash and wound. Negative for pallor. Neurological: Negative for syncope. Hematological: Bruises/bleeds easily. Psychiatric/Behavioral: Positive for sleep disturbance. Negative for confusion.    All other systems reviewed and are negative. Vitals:    05/01/17 2235   BP: 150/67   Pulse: (!) 51   Resp: 17   Temp: 98.6 °F (37 °C)   SpO2: 100%   Weight: 65.8 kg (145 lb)   Height: 4' 11\" (1.499 m)            Physical Exam   Constitutional: She is oriented to person, place, and time. She appears well-developed and well-nourished. No distress. HENT:   Head: Normocephalic and atraumatic. Right Ear: External ear normal.   Left Ear: External ear normal.   Nose: Nose normal.   Mouth/Throat: Uvula is midline, oropharynx is clear and moist and mucous membranes are normal.   Eyes: Conjunctivae are normal. No scleral icterus. Neck: Neck supple. Cardiovascular: Normal rate, regular rhythm, normal heart sounds and intact distal pulses. Pulmonary/Chest: Effort normal and breath sounds normal.   Abdominal: Soft. There is no tenderness. Musculoskeletal: She exhibits edema. Legs:  Neurological: She is alert and oriented to person, place, and time. Gait normal.   Skin: Skin is warm and dry. She is not diaphoretic. Psychiatric: Her behavior is normal.   Nursing note and vitals reviewed. [facility]  ED Course       Arthrocentesis  Date/Time: 5/2/2017 1:36 AM  Performed by: Ramirez Staff  Authorized by: Ramirez Staff     Consent:     Consent obtained:  Verbal    Consent given by:  Patient    Risks discussed:  Bleeding, infection and pain    Alternatives discussed:  No treatment  Location:     Location:  Knee    Knee:  R knee  Anesthesia (see MAR for exact dosages): Anesthesia method:  Local infiltration    Local anesthetic:  Lidocaine 1% w/o epi  Procedure details:     Needle gauge:  18 G    Ultrasound guidance: no      Approach:  Medial    Aspirate characteristics:  Bloody    Steroid injected: no      Specimen collected: yes    Post-procedure details:     Dressing:  Gauze roll    Patient tolerance of procedure:   Tolerated well, no immediate complications        Vitals:  Patient Vitals for the past 12 hrs: Temp Pulse Resp BP SpO2   05/01/17 2235 98.6 °F (37 °C) (!) 51 17 150/67 100 %         Medications ordered:   Medications   sodium chloride (NS) flush 5-10 mL (not administered)   sodium chloride (NS) flush 5-10 mL (not administered)   cefTRIAXone (ROCEPHIN) 1 g in 0.9% sodium chloride (MBP/ADV) 50 mL MBP (0 g IntraVENous IV Completed 5/2/17 0056)   vancomycin (VANCOCIN) 1,000 mg in 0.9% sodium chloride (MBP/ADV) 250 mL adv (1,000 mg IntraVENous New Bag 5/2/17 0119)   lidocaine (XYLOCAINE) 10 mg/mL (1 %) injection 20 mL (20 mL SubCUTAneous Given by Provider 5/1/17 0010)         Lab findings:  Recent Results (from the past 12 hour(s))   CBC WITH AUTOMATED DIFF    Collection Time: 05/01/17 11:34 PM   Result Value Ref Range    WBC 12.9 4.6 - 13.2 K/uL    RBC 4.00 (L) 4.20 - 5.30 M/uL    HGB 12.3 12.0 - 16.0 g/dL    HCT 36.8 35.0 - 45.0 %    MCV 92.0 74.0 - 97.0 FL    MCH 30.8 24.0 - 34.0 PG    MCHC 33.4 31.0 - 37.0 g/dL    RDW 16.7 (H) 11.6 - 14.5 %    PLATELET 794 (L) 581 - 420 K/uL    MPV 11.3 9.2 - 11.8 FL    NEUTROPHILS 94 (H) 40 - 73 %    LYMPHOCYTES 3 (L) 21 - 52 %    MONOCYTES 2 (L) 3 - 10 %    EOSINOPHILS 1 0 - 5 %    BASOPHILS 0 0 - 2 %    ABS. NEUTROPHILS 12.1 (H) 1.8 - 8.0 K/UL    ABS. LYMPHOCYTES 0.4 (L) 0.9 - 3.6 K/UL    ABS. MONOCYTES 0.2 0.05 - 1.2 K/UL    ABS. EOSINOPHILS 0.2 0.0 - 0.4 K/UL    ABS.  BASOPHILS 0.0 0.0 - 0.06 K/UL    DF AUTOMATED     PROTHROMBIN TIME + INR    Collection Time: 05/01/17 11:34 PM   Result Value Ref Range    Prothrombin time 37.2 (H) 11.5 - 15.2 sec    INR 4.1 (H) 0.8 - 1.2     PTT    Collection Time: 05/01/17 11:34 PM   Result Value Ref Range    aPTT 60.5 (H) 23.0 - 36.4 SEC   POC LACTIC ACID    Collection Time: 05/01/17 11:48 PM   Result Value Ref Range    Lactic Acid (POC) 1.2 0.4 - 2.0 mmol/L   CELL COUNT AND DIFF, BODY FLUID    Collection Time: 05/02/17 12:10 AM   Result Value Ref Range    BODY FLUID TYPE KNEE FLUID      FLUID COLOR RED      FLUID APPEARANCE BLOODY FLUID RBC COUNT (A) NRRE /cu mm     SPECIMEN GROSSLY BLOODY. RBC'S TOO NUMEROUS TO COUNT. FLD NUCLEATED CELLS 3950 (A) NRRE /cu mm    FLD SEGS PENDING %    FLD BANDS PENDING %    FLD LYMPHS PENDING %    FLD MONOCYTES PENDING %    FLD EOSINS PENDING %       EKG interpretation by ED Physician:      X-Ray, CT or other radiology findings or impressions:  XR KNEE RT MAX 2 VWS    (Results Pending)   DUPLEX LOWER EXT VENOUS RIGHT    (Results Pending)     No fx on xray    Progress notes, Consult notes or additional Procedure notes: Will need admission for cellulitis. Also eval for possible septic joint although very low suspicion. D/w pt and family at bedside need for admission, abx  D/w dr Emily Garcia on call for hospitalist who will admit    Reevaluation of patient:   Stable for admission    Disposition:  Diagnosis:   1. Cellulitis of right leg    2. Knee effusion, right    3. Coagulopathy ([address]        Disposition: admit    Follow-up Information     None            Patient's Medications   Start Taking    No medications on file   Continue Taking    ALLOPURINOL (ZYLOPRIM) 100 MG TABLET    Take 100 mg by mouth daily. COLCHICINE 0.6 MG TABLET    Take 0.3 mg by mouth daily. DIGOXIN (LANOXIN) 0.125 MG TABLET    Take 0.0625 mg by mouth daily. DILTIAZEM XR (DILACOR XR) 120 MG XR CAPSULE    Take  by mouth daily. ERGOCALCIFEROL (ERGOCALCIFEROL) 50,000 UNIT CAPSULE    Take 400 Units by mouth daily. FEBUXOSTAT (ULORIC) 40 MG TAB TABLET    Take 40 mg by mouth daily. FLAXSEED OIL OIL    by Does Not Apply route. FUROSEMIDE (LASIX) 40 MG TABLET    Take 40 mg by mouth daily. LEVOFLOXACIN (LEVAQUIN) 750 MG TABLET    Take 1 Tab by mouth every fourty-eight (48) hours. LEVOTHYROXINE (SYNTHROID) 88 MCG TABLET    Take 88 mcg by mouth Daily (before breakfast). METOPROLOL TARTRATE (LOPRESSOR) 50 MG TABLET    Take 100 mg by mouth two (2) times a day.     MULTIVITAMIN, TX-IRON-CA-MIN (THERA-M W/ IRON) 9 MG IRON-400 MCG TAB TABLET    Take 1 Tab by mouth daily. POTASSIUM CITRATE 15 MEQ TBER    Take  by mouth. RIVAROXABAN (XARELTO) 15 MG TAB TABLET    Take 15 mg by mouth daily (with breakfast). SIMVASTATIN (ZOCOR) 20 MG TABLET    Take 20 mg by mouth nightly. TELMISARTAN (MICARDIS) 80 MG TABLET    Take 80 mg by mouth daily. TRAMADOL (ULTRAM) 50 MG TABLET    Take 1 Tab by mouth every six (6) hours as needed. Max Daily Amount: 200 mg. VANCOMYCIN 1,000 MG 1,000 MG IVPB    1,000 mg by IntraVENous route every twenty-four (24) hours.    These Medications have changed    No medications on file   Stop Taking    No medications on file

## 2017-05-02 NOTE — ROUTINE PROCESS
Bedside and Verbal shift change report given to Lizett Llanes RN (oncoming nurse) by Will Fuentes RN   (offgoing nurse). Report included the following information SBAR, Kardex, MAR and Recent Results.

## 2017-05-03 NOTE — CONSULTS
Infectious Disease Consultation Note    Requested by: DR. Palmer Garcia    Reason: ?cellulitis R leg    Current abx Prior abx   Cefazolin 5/3 - 0  abx 5/2 - 1 Vancomycin Ceftriaxone 5/2 - 0      ASSESSMENT - > REC:     Right lower extremity cellulitis, non-purulent  - ankle to distal thigh, acute onset on day of admission  - likely due to chronic edema and dry skin on feet rather than right knee injection 3 wks PTA  - has h/o prior cellulitis episodes -> continue Cefazolin  -> monitor for ADR to Cefazolin  -> elevate right leg above heart level  -> if continues to improve could change to po Cephalexin for 7 day course (total)  -> for prevention: control edema, use support stockings, skin moisturizer   H/o allergies amoxicillin, clarithromycin (unknown) and clindamycin (rash)  - tolerated Ceftriaxone and so far Cefazolin -> monitor for ADR on Cefazolin   Chronic Atrial Fibrillation    DJD    Gout    Sleep Apnea      MICROBIOLOGY:   5/1 R knee fluid cx IP   blcx NGTD x 2     LINES AND CATHETERS:   piv      HPI:    80year-old  female with DJD, chronic atrial fibrillation, Gout, Sleep apnea admitted to [facility] 5/2/2017 due to Right leg and thigh redness. She has chronic lower extremity edema of both legs and has previous episodes of cellulitis, on on each leg. The last episode was around seven months PTA on her left leg and she had a prior episode involving her right leg. 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 On 4/13/2017 (3 weeks PTA) she had an injection in her right knee after which she reports she had persistent leaking of clear fluid from the injection site. She had no redness or swelling at that time and only on the day of admission did she notice redness and tenderness of her medial right thigh and right leg. There was no fever or chills or sweats.   She was admitted and given IV Vancomycin and Ceftriaxone on 5/2 and changed to Cefazolin starting 5/3.  She feels the redness and tenderness has already improved. Past Medical History:   Diagnosis Date    Atrial fibrillation ([address]     Gout     Sleep apnea        Past Surgical History:   Procedure Laterality Date    HX HEART CATHETERIZATION         Allergies: Augmentin [amoxicillin-pot clavulanate]; Biaxin [clarithromycin]; Clindamycin; Codeine; Doxycycline; Procardia [nifedipine]; and Trimox [amoxicillin] . Current Facility-Administered Medications   Medication Dose Route Frequency    colchicine tablet 0.3 mg  0.3 mg Oral DAILY    digoxin (LANOXIN) tablet 0.0625 mg  0.0625 mg Oral DAILY    febuxostat (ULORIC) tablet 40 mg  40 mg Oral DAILY    furosemide (LASIX) tablet 40 mg  40 mg Oral DAILY    levothyroxine (SYNTHROID) tablet 88 mcg  88 mcg Oral ACB    metoprolol tartrate (LOPRESSOR) tablet 100 mg  100 mg Oral BID    multivitamin, tx-iron-ca-min (THERA-M w/ IRON) tablet 1 Tab  1 Tab Oral DAILY    simvastatin (ZOCOR) tablet 20 mg  20 mg Oral QHS    telmisartan (MICARDIS) tablet 80 mg  80 mg Oral DAILY    traMADol (ULTRAM) tablet 50 mg  50 mg Oral Q6H PRN    dilTIAZem CD (CARDIZEM CD) capsule 120 mg  120 mg Oral DAILY    rivaroxaban (XARELTO) tablet 15 mg  15 mg Oral DAILY WITH BREAKFAST    ceFAZolin (ANCEF) 1 g in 0.9% sodium chloride (MBP/ADV) 50 mL MBP  1 g IntraVENous Q8H       Family History   Problem Relation Age of Onset    Breast Cancer Child 39    Breast Cancer Maternal Grandmother      Social History     Social History    Marital status:      Spouse name: N/A    Number of children: N/A    Years of education: N/A     Occupational History    Not on file.      Social History Main Topics    Smoking status: Not on file    Smokeless tobacco: Not on file    Alcohol use Not on file    Drug use: Not on file    Sexual activity: Not on file     Other Topics Concern    Not on file     Social History Narrative     History   Smoking Status    Not on file   Smokeless Tobacco  Not on file        Temp (24hrs), Av.5 °F (36.4 °C), Min:97.3 °F (36.3 °C), Max:97.6 °F (36.4 °C)    Visit Vitals    /75 (BP 1 Location: Left arm, BP Patient Position: At rest)    Pulse 61    Temp 97.6 °F (36.4 °C)    Resp 14    Ht 4' 11\" (1.499 m)    Wt 65.8 kg (145 lb)    SpO2 97%    BMI 29.29 kg/m2       ROS:A comprehensive review of systems was negative except for that written in the History of Present Illness. Physical Exam:    General: Well developed, well nourished 80 y.o.  female in no acute distress. ENT: ENT exam normal, no neck nodes or sinus tenderness  Head: normocephalic, without obvious abnormality  Mouth:  mucous membranes moist, pharynx normal without lesions  Neck: supple, symmetrical, trachea midline   Cardio:  irregularly irregular rhythm, systolic murmur present, no rubs or gallops  Chest: inspection normal - no chest wall deformities or tenderness, respiratory effort normal  Lungs: clear to auscultation, no wheezes or rales and unlabored breathing  Abdomen: soft, non-tender. Bowel sounds normal. No masses, no organomegaly.   Extremities:  Bilateral lower extremity edema 2+, erythema on right LE from ankle to proximal thigh with extension posteromedially and laterally on right thigh  Neuro: Grossly normal      Labs: Results:   Chemistry Recent Labs      17   07517   0100   GLU  98  116*  107*   NA  142  140  141   K  4.3  4.1  4.6   CL  106  107  106   CO2  25  24  26   BUN  48*  53*  55*   CREA  1.26  1.40*  1.76*   CA  8.6  8.7  8.9   AGAP  11  9  9   BUCR  38*  38*  31*      CBC w/Diff Recent Labs      17   07517   2334   WBC  9.9  10.6  12.9   RBC  3.82*  3.74*  4.00*   HGB  11.6*  11.4*  12.3   HCT  35.0  34.0*  36.8   PLT  119*  102*  121*   GRANS  90*  93*  94*   LYMPH  5*  4*  3*   EOS  2  1  1      Microbiology Recent Labs      17   2340  17   2330  17   0010   CULT  NO GROWTH 1 DAY  NO GROWTH 1 Bravo Turpin M.D.  [facility]   [phone]

## 2017-05-03 NOTE — PROGRESS NOTES
Problem: Mobility Impaired (Adult and Pediatric)  Goal: *Acute Goals and Plan of Care (Insert Text)  Outcome: Resolved/Met Date Met:  05/03/17  PHYSICAL THERAPY EVALUATION AND DISCHARGE     Patient: Yolanad Camacho (80 y.o. female)  Date: 5/3/2017  Primary Diagnosis: Cellulitis of right leg  Precautions:  Fall      ASSESSMENT AND RECOMMENDATIONS:  Based on the objective data described below, the patient presents with mobility level appropriate for discharge to previous home set-up. Mod I with bed mobility and sit to stand transfers. Min A with self care for toileting. Amb 400 feet mod I with wheeled walker. Reports living alone in single story home with ramp to enter with help to assist with cooking, laundry, and driving. Family in area to help as need. Independent in ADLs and mobility with wheeled walker or cane prior to admission. Patient verbalized and demonstrated appropriate safety awareness with mobility tasks. Skilled physical therapy is not indicated at this time. Discharge Recommendations: None  Further Equipment Recommendations for Discharge: N/A      Recommendations for nursing: Amb with ww  Written on communication board: Yes  Verbally communicated to: ROLF Soto Found       SUBJECTIVE:   Patient stated I need to use the bedside commode.       OBJECTIVE DATA SUMMARY:       Past Medical History:   Diagnosis Date    Atrial fibrillation ([address]      Gout      Sleep apnea       Past Surgical History:   Procedure Laterality Date    HX HEART CATHETERIZATION         Barriers to Learning/Limitations: None  Compensate with: visual, verbal, tactile, kinesthetic cues/model     G CODE:  Mobility [id number] Current  CJ= 20-39%  D/C  CJ= 20-39%.   The severity rating is based on the [employer] Balance Scale 3+/5        Eval Complexity: History: MEDIUM  Complexity : 1-2 comorbidities / personal factors will impact the outcome/ POC Exam:MEDIUM Complexity : 3 Standardized tests and measures addressing body structure, function, activity limitation and / or participation in recreation  Presentation: MEDIUM Complexity : Evolving with changing characteristics  Clinical Decision Making:Medium Complexity [facility] Standing Balance Scale 3+/5 Overall Complexity:MEDIUM     [employer] Balance Scale 3+/5  0: Pt performs 25% or less of standing activity (Max assist) CN, 100% impaired. 1: Pt supports self with upper extremities but requires therapist assistance. Pt performs 25-50% of effort (Mod assist) CM, 80% to <100% impaired. 1+: Pt supports self with upper extremities but requires therapist assistance. Pt performs >50% effort. (Min assist). CL, 60% to <80% impaired. 2: Pt supports self independently with both upper extremities (walker, crutches, parallel bars). CL, 60% to <80% impaired. 2+: Pt support self independently with 1 upper extremity (cane, crutch, 1 parallel bar). CK, 40% to <60% impaired. 3: Pt stands without upper extremity support for up to 30 seconds. CK, 40% to <60% impaired. 3+: Pt stands without upper extremity support for 30 seconds or greater. CJ, 20% to <40% impaired. 4: Pt independently moves and returns center of gravity 1-2 inches in one plane. CJ, 20% to <40% impaired. 4+: Pt independently moves and returns center of gravity 1-2 inches in multiple planes. CI, 1% to <20% impaired. 5: Pt independently moves and returns center of gravity in all planes greater than 2 inches. CH, 0% impaired.   Prior Level of Function/Home Situation:  Home Situation  Home Environment: Private residence  # Steps to Enter: 0 (ramp)  Wheelchair Ramp: Yes  One/Two Story Residence: One story  Living Alone: Yes (assist with cooking, laundry, driving)  Support Systems: Friends \ neighbors, Family member(s)  Patient Expects to be Discharged to[de-identified] Private residence  Current DME Used/Available at Home: [address], [place], straight, Walker, rollator  Critical Behavior:  Neurologic State: Alert  Orientation Level: Oriented X4  Cognition: Follows commands; Appropriate safety awareness  Safety/Judgement: Fall prevention  Psychosocial  Patient Behaviors: Calm; Cooperative  Strength:    Strength: Within functional limits  Tone & Sensation:   Tone: Normal  Range Of Motion:  AROM: Within functional limits  Functional Mobility:  Bed Mobility:  Supine to Sit: Modified independent  Sit to Supine: Modified independent  Transfers:  Sit to Stand: Modified independent  Stand to Sit: Modified independent  Balance:   Sitting: Intact  Standing: Impaired  Standing - Static: Fair  Standing - Dynamic : Fair  Ambulation/Gait Training:  Distance (ft): 400 Feet (ft)  Assistive Device: Walker, rolling  Ambulation - Level of Assistance: Modified independent  Gait Description (WDL): Exceptions to WDL  Speed/Jennifer: Slow  Therapeutic Exercises:   n/a  Pain: 0/10  Pre treatment pain: 0  Post treatment pain: 0  Pain Scale 1: Numeric (0 - 10)  Pain Intensity 1: 0  Pain Location 1: Knee;Leg  Pain Orientation 1: Right  Pain Intervention(s) 1: Medication (see MAR)  Activity Tolerance:   Good  Please refer to the flowsheet for vital signs taken during this treatment. After treatment:   [X]         Patient left in no apparent distress sitting up in chair  [ ]         Patient left in no apparent distress in bed  [X]         Call bell left within reach  [ ]         Nursing notified  [ ]         Caregiver present  [ ]         Bed alarm activated      COMMUNICATION/EDUCATION:   [X]         Fall prevention education was provided and the patient/caregiver indicated understanding. [X]         Patient/family have participated as able in goal setting and plan of care. [X]         Patient/family agree to work toward stated goals and plan of care. [ ]         Patient understands intent and goals of therapy, but is neutral about his/her participation. [ ]         Patient is unable to participate in goal setting and plan of care.      Thank you for this referral.  Otoniel Mann, PT   Time Calculation: 26 mins

## 2017-05-03 NOTE — PROGRESS NOTES
Early AM admit  Pt s/e @ bedside  She denies any fever or chills  Had R knee steroid shot mid-April and has had drainage from site ever since  Redness on medial/lateral thigh began yesterday    #R Thigh Erythema, Possible Non-purulent Cellulitis? #Gout  #[facility]  #Hypothyroidism  #HLD  #HTN  #DVT PPx    - Unclear if infectious cellulitis or not as distribution of erythema does not seem consistent with it. For now, will d/c vanco/rocephin. Start cefazolin 1g q8h for nonpurulent cellulitis. Consult ID for assistance as dermatology not available. Pt remains afebrile and w/o leukocytosis  - Cont febuxostat  - Cont diltiazem.  Restart xarelto  - Cont synthroid  - Statin  - Cont home BP meds  - On xarelto    Fadumo Cardenas, DO  Internal Medicine, Hospitalist  Pager: [phone]  [facility]

## 2017-05-03 NOTE — PROGRESS NOTES
Internal Medicine Progress Note    Patient's Name: Sharlene Gray  Admit Date: 5/1/2017  Length of Stay: 1      Assessment/Plan     #RLE Non-purulent Cellulitis  #Gout  [facility]  #Hypothyroidism  #HLD  #HTN  #DVT PPx     - Cont cefazolin. Appreciate ID recs. Possible transition to PO if improving by jennifer  - Cont febuxostat  - Cont diltiazem. Restart xarelto  - Cont synthroid  - Statin  - BP slightly elevated once. Cont home BP meds  - On xarelto    I have personally reviewed all pertinent labs and films that have officially resulted over the last 24 hours. I have personally checked for all pending labs that are awaiting final results.     Subjective     Pt s/e @ bedside  No major events overnight  Pt offers no complaints this AM  Had physical therapy yesterday and it went well  Denies CP or SOB    Objective     Visit Vitals    /75 (BP 1 Location: Left arm, BP Patient Position: At rest)    Pulse 61    Temp 97.6 °F (36.4 °C)    Resp 14    Ht 4' 11\" (1.499 m)    Wt 65.8 kg (145 lb)    SpO2 97%    BMI 29.29 kg/m2       Physical Exam:  General Appearance: NAD, conversant  Lungs: CTA with normal respiratory effort  CV: RRR, no m/r/g  Abdomen: soft, non-tender, normal bowel sounds  Extremities: no cyanosis, B/L LE +2 pitting edema w/ RLE erythema extending proximally from ankle to thigh  Psych: appropriate affect, alert and oriented to person, place and time    Lab/Data Reviewed:  BMP:   Lab Results   Component Value Date/Time     05/03/2017 04:55 AM    K 4.3 05/03/2017 04:55 AM     05/03/2017 04:55 AM    CO2 25 05/03/2017 04:55 AM    AGAP 11 05/03/2017 04:55 AM    GLU 98 05/03/2017 04:55 AM    BUN 48 (H) 05/03/2017 04:55 AM    CREA 1.26 05/03/2017 04:55 AM    GFRAA 48 (L) 05/03/2017 04:55 AM    GFRNA 40 (L) 05/03/2017 04:55 AM     CBC:   Lab Results   Component Value Date/Time    WBC 9.9 05/03/2017 04:55 AM    HGB 11.6 (L) 05/03/2017 04:55 AM    HCT 35.0 05/03/2017 04:55 AM    PLT 119 (L) 05/03/2017 04:55 AM       Imaging Reviewed:  No results found.     Medications Reviewed:  Current Facility-Administered Medications   Medication Dose Route Frequency    colchicine tablet 0.3 mg  0.3 mg Oral DAILY    digoxin (LANOXIN) tablet 0.0625 mg  0.0625 mg Oral DAILY    febuxostat (ULORIC) tablet 40 mg  40 mg Oral DAILY    furosemide (LASIX) tablet 40 mg  40 mg Oral DAILY    levothyroxine (SYNTHROID) tablet 88 mcg  88 mcg Oral ACB    metoprolol tartrate (LOPRESSOR) tablet 100 mg  100 mg Oral BID    multivitamin, tx-iron-ca-min (THERA-M w/ IRON) tablet 1 Tab  1 Tab Oral DAILY    simvastatin (ZOCOR) tablet 20 mg  20 mg Oral QHS    telmisartan (MICARDIS) tablet 80 mg  80 mg Oral DAILY    traMADol (ULTRAM) tablet 50 mg  50 mg Oral Q6H PRN    dilTIAZem CD (CARDIZEM CD) capsule 120 mg  120 mg Oral DAILY    rivaroxaban (XARELTO) tablet 15 mg  15 mg Oral DAILY WITH BREAKFAST    ceFAZolin (ANCEF) 1 g in 0.9% sodium chloride (MBP/ADV) 50 mL MBP  1 g IntraVENous Q8H           Lenora Palmer DO  Internal Medicine, Hospitalist  Pager: [phone]  [address] [place] [address]

## 2017-05-03 NOTE — PROGRESS NOTES
1935: Bedside verbal shift report received with Tha Coley (preceptor) from [facility]. Pt is awake in bed, no signs of distress, instructed to press call light if assistance is needed, call light within reach. 1403: Bedside and Verbal shift change report given to Radha Gee RN (oncoming nurse) by Tessy Tabares RN (offgoing nurse) and Isidoro Sloan RN (preceptor). Report included the following information SBAR, Kardex, Intake/Output, MAR and Recent Results.

## 2017-05-04 NOTE — ROUTINE PROCESS
8570 assumed care of pt after bedside verbal report was given by off going nurse, pt rsting in bed awake, no acute distress noted, pt denies c/o pain, will monitor    1233 pt sitting up in chair eating lunch, no changes     1658 pt up in bed awake, watching tv, no distress noted, will monitor     1956 Bedside and Verbal shift change report given to ROLF Ibarra (oncoming nurse) by ROLF Stephens (offgoing nurse). Report included the following information SBAR, Kardex and MAR.

## 2017-05-04 NOTE — PROGRESS NOTES
Chart reviewed and talked with pt. Pt to be discharged today. Pt says no HH wound care needed, her wound is closed. Gave me permission to talk to daughter and let her know of dc. Dtr will come after 1 pm when she is off work.

## 2017-05-04 NOTE — ROUTINE PROCESS
Assumed care of pt report received from [facility]. Pt awake, alert and oriented X 3. Pt denies any pain at this time. No verbal distress noted. Bed in lowest position & wheels locked. Call bell within reach. 5/4/17 - 0725 -Bedside and Verbal shift change report given to Alexander Vallejo RN (oncoming nurse) by Ritesh Castaneda RN BSN (offgoing nurse). Report given with SBAR, Kardex, Intake/Output, MAR and Recent Results.

## 2017-05-04 NOTE — PROGRESS NOTES
INFECTIOUS DISEASE FOLLOW UP NOTE :-     Admit Date: 5/1/2017    Current abx Prior abx   Cefazolin 5/3 - 1, abx 5/2 - 2 Vancomycin Ceftriaxone 5/2 - 0      ASSESSMENT - > REC:      Right lower extremity cellulitis, non-purulent  - ankle to distal thigh, acute onset on day of admission  - likely due to chronic edema and dry skin on feet rather than right knee injection 3 wks PTA  - has h/o prior cellulitis episodes -> on Cefazolin and tolerating well without any ADR   -> continue to elevate right leg above heart level  ->change to po Cephalexin at discharge for 7 days course (total)  -> for prevention: control edema, use support stockings, skin moisturizer   H/o allergies amoxicillin, clarithromycin (unknown) and clindamycin (rash)  - tolerated Ceftriaxone and so far Cefazolin -> monitor for ADR on Cefazolin   Chronic Atrial Fibrillation     DJD     Gout     Sleep Apnea        MICROBIOLOGY:   5/1 R knee fluid cx ntd  blcx NGTD x 2      LINES AND CATHETERS:   piv       MEDICATIONS:     Current Facility-Administered Medications   Medication Dose Route Frequency    colchicine tablet 0.3 mg  0.3 mg Oral DAILY    digoxin (LANOXIN) tablet 0.0625 mg  0.0625 mg Oral DAILY    febuxostat (ULORIC) tablet 40 mg  40 mg Oral DAILY    furosemide (LASIX) tablet 40 mg  40 mg Oral DAILY    levothyroxine (SYNTHROID) tablet 88 mcg  88 mcg Oral ACB    metoprolol tartrate (LOPRESSOR) tablet 100 mg  100 mg Oral BID    multivitamin, tx-iron-ca-min (THERA-M w/ IRON) tablet 1 Tab  1 Tab Oral DAILY    simvastatin (ZOCOR) tablet 20 mg  20 mg Oral QHS    telmisartan (MICARDIS) tablet 80 mg  80 mg Oral DAILY    traMADol (ULTRAM) tablet 50 mg  50 mg Oral Q6H PRN    dilTIAZem CD (CARDIZEM CD) capsule 120 mg  120 mg Oral DAILY    rivaroxaban (XARELTO) tablet 15 mg  15 mg Oral DAILY WITH BREAKFAST    ceFAZolin (ANCEF) 1 g in 0.9% sodium chloride (MBP/ADV) 50 mL MBP  1 g IntraVENous Q8H       SUBJECTIVE :     Interval notes reviewed. Afebrile. Feels well. Leg and knee feels better. No N, V or diarrhea. Donna Abx well. cx d/w her. OBJECTIVE     Visit Vitals    /72 (BP 1 Location: Left arm, BP Patient Position: At rest)    Pulse 62    Temp 97.8 °F (36.6 °C)    Resp 16    Ht 4' 11\" (1.499 m)    Wt 65.8 kg (145 lb)    SpO2 97%    BMI 29.29 kg/m2       Temp (24hrs), Av.7 °F (36.5 °C), Min:97.5 °F (36.4 °C), Max:97.8 °F (36.6 °C)      Gen-No distress  HENT- No thrush  Chest- Symmetric expansion, CTA  CV-S1S2 RR, No JVD, No edema  Abd-Soft, NT, ND, BS+  Skin-No jaundice, cyanosis, clubbing. No decubitus  Muscsk- Bilateral lower extremity edema 2+, erythema on right LE from ankle to proximal thigh with extension posteromedially and laterally on right thigh- fading away, ecchymosis+, knee effusion+        Labs: Results:   Chemistry Recent Labs      17   0428  17   0455  17   0752   GLU  109*  98  116*   NA  141  142  140   K  4.2  4.3  4.1   CL  105  106  107   CO2  28  25  24   BUN  49*  48*  53*   CREA  1.16  1.26  1.40*   CA  9.1  8.6  8.7   AGAP  8  11  9   BUCR  42*  38*  38*      CBC w/Diff Recent Labs      17   0455  17   0752  17   2334   WBC  9.9  10.6  12.9   RBC  3.82*  3.74*  4.00*   HGB  11.6*  11.4*  12.3   HCT  35.0  34.0*  36.8   PLT  119*  102*  121*   GRANS  90*  93*  94*   LYMPH  5*  4*  3*   EOS  2  1  1          RADIOLOGY :          Imaging    Results from [place] encounter on 17   XR KNEE RT MAX 2 VWS   Narrative History: Recent steroid injection right knee, with development of redness and  drainage right knee    FINDINGS: AP and crosstable lateral view right knee obtained. There is prior  chronic displaced proximal fibular shaft fracture with surrounding callus and  solid-appearing osseous union. No evidence of acute fracture or dislocation. There is moderate suprapatellar joint effusion.  There is joint space narrowing  and degenerative articular changes most pronounced patellofemoral joint. Impression IMPRESSION:    1. Moderate joint effusion. 2. 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 3. Chronic proximal fibular shaft fracture. No results found for this or any previous visit. I have independently examined the patient and reviewed all lab studies and imgaing as well as review of nursing notes and physican notes from the past 24 hours. The plan of care has been discussed with the patient/relative and all questions are answered.      Duane Rake, MD  May 4, 2017    [facility] AT THE [facility]  [phone]

## 2017-05-04 NOTE — DISCHARGE SUMMARY
Internal Medicine Discharge Summary        Patient: June Camacho    YOB: 1925    Age:  80 y.o. Admit Date: 5/1/2017    Discharge Date: 5/4/2017    LOS:  LOS: 2 days     Discharge To: Home    Consults: Infectious Disease    Admission Diagnoses: Cellulitis of right leg    Discharge Diagnoses:    Problem List as of 5/4/2017  Never Reviewed          Codes Class Noted - Resolved    Cellulitis of right leg ICD-10-CM: L03.115  ICD-9-CM: 682.6  5/2/2017 - Present        Pneumonia ICD-10-CM: J18.9  ICD-9-CM: 462  11/26/2016 - Present        Sepsis ([address] ICD-10-CM: A41.9  ICD-9-CM: 038.9, 995.91  11/26/2016 - Present        Cellulitis of leg, left ICD-10-CM: L03.116  ICD-9-CM: 682.6  11/26/2016 - Present        Cellulitis of left lower extremity ICD-10-CM: L03.116  ICD-9-CM: 682.6  11/18/2016 - Present        Wound dehiscence, traumatic injury repair ICD-10-CM: T81.33XA  ICD-9-CM: 998.33  10/28/2016 - Present        Traumatic open wound of right lower leg with infection ICD-10-CM: S81.801A, L08.9  ICD-9-CM: 891.1  10/28/2016 - Present              Discharge Condition:  Improved    Procedures: None         HPI: The patient is a 80-year-old  female who presents to the ED complaining of pain and swelling and  redness in her right lower extremity. The patient has a history of knee  injections with steroids per Orthopedics. She states that her last  injection was less than a month ago. At the site of the injection the  patient began having some leaking of clear fluid and redness that  started earlier in the day. The patient states that with a history of the  knee injections this has happened in the past. She denies any fever,  nausea, vomiting, diarrhea.  In addition to the swelling, she adds that  she has a history of bilateral lower extremity edema, in which she took  Lasix in the past and currently is not taking Lasix.     While in the ER, the patient was found to have an elevated INR in  addition to the redness and swelling of the right leg. IV antibiotics were  initiated at that time. The Xarelto will be held due to supratherapeutic  INR. The patient admitted for further management. Hospital Course (Including Significant Findings): The patient was admitted to the hospital for further management of their presenting condition. She was continued on broad spectrum antibiotics, initially with zosyn and rocephin, which was later switched to cefazolin. Infectious disease was consulted for further antibiotic assistance and length of therapy. She was eventually changed to Keflex at discharge for an additional 5 days. She had no complaints on the day of discharge. The rest of the patient's chronic conditions were managed appropriately during their admission. They were medically stable at the time of discharge.     Visit Vitals    /72 (BP 1 Location: Left arm, BP Patient Position: At rest)    Pulse 62    Temp 97.8 °F (36.6 °C)    Resp 16    Ht 4' 11\" (1.499 m)    Wt 65.8 kg (145 lb)    SpO2 97%    BMI 29.29 kg/m2       Physical Exam at Discharge:  General Appearance: NAD, conversant  HENT: normocephalic/atraumatic, moist mucus membranes  Lungs: CTA with normal respiratory effort  CV: RRR, no m/r/g  Abdomen: soft, non-tender, normal bowel sounds  Extremities: no cyanosis, B/L LE +2 pitting edema w/ RLE erythema extending proximally from ankle to thigh  Neuro: moves all extremities, no focal deficits  Psych: appropriate affect, alert and oriented to person, place and time    Labs Prior to Discharge:  Labs: Results:       Chemistry Recent Labs      05/04/17   0428  05/03/17   0455  05/02/17   0752   GLU  109*  98  116*   NA  141  142  140   K  4.2  4.3  4.1   CL  105  106  107   CO2  28  25  24   BUN  49*  48*  53*   CREA  1.16  1.26  1.40*   CA  9.1  8.6  8.7   AGAP  8  11  9   BUCR  42*  38*  38*      CBC w/Diff Recent Labs      05/03/17 0455 05/02/17   0752  05/01/17   2334   WBC  9.9 10.6  12.9   RBC  3.82*  3.74*  4.00*   HGB  11.6*  11.4*  12.3   HCT  35.0  34.0*  36.8   PLT  119*  102*  121*   GRANS  90*  93*  94*   LYMPH  5*  4*  3*   EOS  2  1  1      Cardiac Enzymes No results for input(s): CPK, CKND1, CRISTINA in the last 72 hours. No lab exists for component: CKRMB, TROIP   Coagulation Recent Labs      05/02/17   0752  05/01/17   2334   PTP  28.7*  37.2*   INR  2.9*  4.1*   APTT   --   60.5*       Lipid Panel Lab Results   Component Value Date/Time    Cholesterol, total 132 07/07/2010 10:57 AM    HDL Cholesterol 65 07/07/2010 10:57 AM    LDL, calculated 55.6 07/07/2010 10:57 AM    VLDL, calculated 11.4 07/07/2010 10:57 AM    Triglyceride 57 07/07/2010 10:57 AM    CHOL/HDL Ratio 2.0 07/07/2010 10:57 AM      BNP No results for input(s): BNPP in the last 72 hours. Liver Enzymes No results for input(s): TP, ALB, TBIL, AP, SGOT, GPT in the last 72 hours. No lab exists for component: DBIL   Thyroid Studies Lab Results   Component Value Date/Time    TSH 2.43 11/27/2016 12:45 AM            Significant Imaging:  Xr Knee Rt Max 2 Vws    Result Date: 5/2/2017  History: Recent steroid injection right knee, with development of redness and drainage right knee FINDINGS: AP and crosstable lateral view right knee obtained. There is prior chronic displaced proximal fibular shaft fracture with surrounding callus and solid-appearing osseous union. No evidence of acute fracture or dislocation. There is moderate suprapatellar joint effusion. There is joint space narrowing and degenerative articular changes most pronounced patellofemoral joint. IMPRESSION: 1. Moderate joint effusion. 2. 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 3. Chronic proximal fibular shaft fracture.            Discharge Medications:     Current Discharge Medication List      START taking these medications    Details   cephALEXin (KEFLEX) 500 mg capsule Take 1 Cap by mouth four (4) times daily for 5 days. Qty: 20 Cap, Refills: 0         CONTINUE these medications which have NOT CHANGED    Details   Potassium Citrate 15 mEq TbER Take  by mouth. febuxostat (ULORIC) 40 mg tab tablet Take 40 mg by mouth daily. traMADol (ULTRAM) 50 mg tablet Take 1 Tab by mouth every six (6) hours as needed. Max Daily Amount: 200 mg. Qty: 10 Tab, Refills: 0      dilTIAZem XR (DILACOR XR) 120 mg XR capsule Take  by mouth daily. levothyroxine (SYNTHROID) 88 mcg tablet Take 88 mcg by mouth Daily (before breakfast). simvastatin (ZOCOR) 20 mg tablet Take 20 mg by mouth nightly. telmisartan (MICARDIS) 80 mg tablet Take 80 mg by mouth daily. rivaroxaban (XARELTO) 15 mg tab tablet Take 15 mg by mouth daily (with breakfast). furosemide (LASIX) 40 mg tablet Take 40 mg by mouth daily. multivitamin, tx-iron-ca-min (THERA-M W/ IRON) 9 mg iron-400 mcg tab tablet Take 1 Tab by mouth daily. Flaxseed Oil oil by Does Not Apply route. metoprolol tartrate (LOPRESSOR) 50 mg tablet Take 100 mg by mouth two (2) times a day. digoxin (LANOXIN) 0.125 mg tablet Take 0.0625 mg by mouth daily. colchicine 0.6 mg tablet Take 0.3 mg by mouth daily. ergocalciferol (ERGOCALCIFEROL) 50,000 unit capsule Take 400 Units by mouth daily. allopurinol (ZYLOPRIM) 100 mg tablet Take 100 mg by mouth daily. STOP taking these medications       levoFLOXacin (LEVAQUIN) 750 mg tablet Comments:   Reason for Stopping:         vancomycin 1,000 mg 1,000 mg IVPB Comments:   Reason for Stopping:               Activity: Activity as tolerated    Diet: Cardiac Diet    Wound Care: As directed    Follow-up:   Please follow up with your PCP within 7 days to discuss your recent hospitalization. Patient to arrange.          Total time spent including time spent on final examination and discharge discussion, discharge documentation and records reviewed and medication reconciliation: > 30 minutes    Reyes Clan, DO  Internal Medicine, Hospitalist  Pager: [phone] [facility]

## 2017-05-04 NOTE — DISCHARGE INSTRUCTIONS
DISCHARGE SUMMARY from Nurse    The following personal items are in your possession at time of discharge:    Dental Appliances: None  Visual Aid: Glasses, With patient     Home Medications: None  Jewelry: Bracelet  Clothing: Footwear, Pants, Shirt, Socks, Undergarments  Other Valuables: Eyeglasses             PATIENT INSTRUCTIONS:    After general anesthesia or intravenous sedation, for 24 hours or while taking prescription Narcotics:  · Limit your activities  · Do not drive and operate hazardous machinery  · Do not make important personal or business decisions  · Do  not drink alcoholic beverages  · If you have not urinated within 8 hours after discharge, please contact your surgeon on call. Report the following to your surgeon:  · Excessive pain, swelling, redness or odor of or around the surgical area  · Temperature over 100.5  · Nausea and vomiting lasting longer than 4 hours or if unable to take medications  · Any signs of decreased circulation or nerve impairment to extremity: change in color, persistent  numbness, tingling, coldness or increase pain  · Any questions        What to do at Home:  Recommended activity: as physician advised    If you experience any of the following symptoms listed under Warning Signs of a Heart Attack, Signs and Symptoms of a Stroke and \"When to Call for Help\" listed under discharge teaching, please follow up with your primary care physician and/or call 911. *  Please give a list of your current medications to your Primary Care Provider. *  Please update this list whenever your medications are discontinued, doses are      changed, or new medications (including over-the-counter products) are added. *  Please carry medication information at all times in case of emergency situations.           These are general instructions for a healthy lifestyle:    No smoking/ No tobacco products/ Avoid exposure to second hand smoke    Surgeon General's Warning:  Quitting smoking now greatly reduces serious risk to your health. Obesity, smoking, and sedentary lifestyle greatly increases your risk for illness    A healthy diet, regular physical exercise & weight monitoring are important for maintaining a healthy lifestyle    You may be retaining fluid if you have a history of heart failure or if you experience any of the following symptoms:  Weight gain of 3 pounds or more overnight or 5 pounds in a week, increased swelling in our hands or feet or shortness of breath while lying flat in bed. Please call your doctor as soon as you notice any of these symptoms; do not wait until your next office visit. Recognize signs and symptoms of STROKE:    F-face looks uneven    A-arms unable to move or move unevenly    S-speech slurred or non-existent    T-time-call 911 as soon as signs and symptoms begin-DO NOT go       Back to bed or wait to see if you get better-TIME IS BRAIN. Warning Signs of HEART ATTACK     Call 911 if you have these symptoms:   Chest discomfort. Most heart attacks involve discomfort in the center of the chest that lasts more than a few minutes, or that goes away and comes back. It can feel like uncomfortable pressure, squeezing, fullness, or pain.  Discomfort in other areas of the upper body. Symptoms can include pain or discomfort in one or both arms, the back, neck, jaw, or stomach.  Shortness of breath with or without chest discomfort.  Other signs may include breaking out in a cold sweat, nausea, or lightheadedness. Don't wait more than five minutes to call 911 - MINUTES MATTER! Fast action can save your life. Calling 911 is almost always the fastest way to get lifesaving treatment. Emergency Medical Services staff can begin treatment when they arrive -- up to an hour sooner than if someone gets to the hospital by car. The discharge information has been reviewed with the patient. The spouse verbalized understanding.     Discharge medications reviewed with the patient and appropriate educational materials and side effects teaching were provided. Cellulitis: Care Instructions  Your Care Instructions    Cellulitis is a skin infection. It often occurs after a break in the skin from a scrape, cut, bite, or puncture, or after a rash. The doctor has checked you carefully, but problems can develop later. If you notice any problems or new symptoms, get medical treatment right away. Follow-up care is a key part of your treatment and safety. Be sure to make and go to all appointments, and call your doctor if you are having problems. It's also a good idea to know your test results and keep a list of the medicines you take. How can you care for yourself at home? · Take your antibiotics as directed. Do not stop taking them just because you feel better. You need to take the full course of antibiotics. · Prop up the infected area on pillows to reduce pain and swelling. Try to keep the area above the level of your heart as often as you can. · If your doctor told you how to care for your wound, follow your doctor's instructions. If you did not get instructions, follow this general advice:  ¨ Wash the wound with clean water 2 times a day. Don't use hydrogen peroxide or alcohol, which can slow healing. ¨ You may cover the wound with a thin layer of petroleum jelly, such as Vaseline, and a nonstick bandage. ¨ Apply more petroleum jelly and replace the bandage as needed. · Be safe with medicines. Take pain medicines exactly as directed. ¨ If the doctor gave you a prescription medicine for pain, take it as prescribed. ¨ If you are not taking a prescription pain medicine, ask your doctor if you can take an over-the-counter medicine. To prevent cellulitis in the future  · Try to prevent cuts, scrapes, or other injuries to your skin. Cellulitis most often occurs where there is a break in the skin.   · If you get a scrape, cut, mild burn, or bite, wash the wound with clean water as soon as you can to help avoid infection. Don't use hydrogen peroxide or alcohol, which can slow healing. · If you have swelling in your legs (edema), support stockings and good skin care may help prevent leg sores and cellulitis. · Take care of your feet, especially if you have diabetes or other conditions that increase the risk of infection. Wear shoes and socks. Do not go barefoot. If you have athlete's foot or other skin problems on your feet, talk to your doctor about how to treat them. When should you call for help? Call your doctor now or seek immediate medical care if:  · You have signs that your infection is getting worse, such as:  ¨ Increased pain, swelling, warmth, or redness. ¨ Red streaks leading from the area. ¨ Pus draining from the area. ¨ A fever. · You get a rash. Watch closely for changes in your health, and be sure to contact your doctor if:  · You are not getting better after 1 day (24 hours). · You do not get better as expected. Where can you learn more? Go to http://david-luanne.info/. Carla Robert in the search box to learn more about \"Cellulitis: Care Instructions. \"  Current as of: October 13, 2016  Content Version: 11.2  © 2458-0119 [employer]. Care instructions adapted under license by [employer] (which disclaims liability or warranty for this information). If you have questions about a medical condition or this instruction, always ask your healthcare professional. [place] [address] any warranty or liability for your use of this information. Patient armband removed and shredded.

## 2017-05-30 NOTE — PROGRESS NOTES
PHYSICAL THERAPY - DAILY TREATMENT NOTE    Patient Name: Sugey Kam        Date: 2017  : 1925   YES Patient  Verified  Visit #:   1     Insurance: Payor: [place] MED[place]ARE / Plan: [place] MEDICARE PART A & B / Product Type: Medicare /      In time: 3:10 Out time: 4:05   Total Treatment Time: 54     Medicare Time Tracking (below)   Total Timed Codes (min):  55 1:1 Treatment Time:  55     TREATMENT AREA =  B knees, gait instability    SUBJECTIVE    Pain Level (on 0 to 10 scale):    / 10   Medication Changes/New allergies or changes in medical history, any new surgeries or procedures? YES    If yes, update Summary List   Subjective Functional Status/Changes:  []  No changes reported     History of Condition: Patient reports chronic history of knee pain, reports she is coming to therapy because she used to get injections in knees and the last time the hold didn't heal and she ended up with cellulitis, so MD will no longer give her shots. Patient reports she has been using walker since Dec when she was hospitalized, previously using SPC. Patient reports she lives alone, has ramp to get into house, has afternoon caregiver. Patient also reports fall 4/15/17, reports she let go of walker to put on bathrobe and fell backwards.     Aggravating Factors: prolonged standing (20 mins), prolonged walking, falls    Alleviating Factors: ice, rest    Previous Treatment: several rounds of injections, tramadol    PMHx:see chart    Social/Recreational/Work: previously enjoyed bowling (has not done for many years), gardening; sedentary    Pt Goals: \"get these knees working better\"    FOTO: 41         OBJECTIVE  Physical Therapy Evaluation - Knee    Gait:  [] Normal    [] Abnormal    [x] Antalgic    [] NWB    Device: RW    Describe: very slow gait speed, forward flexed posture, decreased step length    ROM / Strength  [] Unable to assess                  AROM                      PROM                   Strength (1-5) Left Right Left Right Left Right   Hip Flexion     4- 3+    Extension          Abduction seated     3- 3-    Adduction seated     2- 2-   Knee Flexion 103 105 105 112 3+ 4    Extension 0 0   4- 4-   Ankle Plantarflexion          Dorsiflexion     4- 4       Flexibility: [] Unable to assess at this time  Hamstrings:    (L) Tightness= [] WNL   [] Min   [] Mod   [] Severe    (R) Tightness= [] WNL   [] Min   [] Mod   [] Severe  Quadriceps:    (L) Tightness= [] WNL   [] Min   [] Mod   [] Severe    (R) Tightness= [] WNL   [] Min   [] Mod   [] Severe  Gastroc:      (L) Tightness= [] WNL   [] Min   [] Mod   [] Severe    (R) Tightness= [] WNL   [] Min   [] Mod   [] Severe  Other:    Palpation:   Neg/Pos  Neg/Pos  Neg/Pos   Joint Line + med/lat B Quad tendon  Patellar ligament    Patella  Fibular head  Pes Anserinus    Tibial tubercle  Hamstring tendons  Infrapatellar fat pad      Optional Tests:   Patellar Positioning (Static)   []L []R Normal []L []R Lateral   []L []R Dorinda Sjogren      []L []R Medial   []L []R [place]    Patellar Tracking   []L []R Glide (Lat)   []L []R Tilt (Lat)     []L []R Glide (Med)  []L []R Tilt (Med)      []L []R Tile (Inf)     Patellar Mobility   []L []R Hypermobile []L []R Hypomobile         Girth Measurements in cm:      4 in above midpatella   2 in above midpatella  at  midpatella  2 in below midpatella   4 in below midpatella   Left        Right           Lachmans  [] Neg    [] Pos Posterior Drawer [] Neg    [] Pos  Pivot Shift  [] Neg    [] Pos Posterior Sag  [] Neg    [] Pos  MARION   [] Neg    [] Pos Charisse's Test [] Neg    [] Pos  ALRI   [] Neg    [] Pos Squat   [] Neg    [] Pos  Valgus@ 0 Degrees [] Neg    [] Pos José Luis  [] Neg    [] Pos  Valgus@ 30 Degrees [] Neg    [] Pos Patellar Apprehension [] Neg    [] Pos  Varus@ 0 Degrees [] Neg    [] Pos Aceves's Compression [] Neg    [] Pos  Varus@ 30 Degrees [] Neg    [] Pos Ely's Test  [] Neg    [] Pos  Apley's Compression [] Neg    [] Pos Connie's Test  [] Neg    [] Pos  Apley's Distraction [] Neg    [] Pos Stroke Test  [] Neg    [] Pos   Anterior Drawer [] Neg    [] Pos Fluctuation Test [] Neg    [] Pos  Other:                  [] Neg    [] Pos                 15 min Therapeutic Activity: FOTO   Rationale:   Determine functional limitations   min Patient Education:  YES  Reviewed HEP   []  Progressed/Changed HEP based on:   HEP issued      Other Objective/Functional Measures:     Balance Standard Testing (Eyes Open/Eyes Closed - EO/EC)       Romberg   [] WFL    [] Pos    Describe: 30\" EO/5\" EC         Stand on Foam  [] WFL    [] Pos    Describe: 5\" EO/NT\" EC         Patient usually wears compression stockings    Mod A with supine to/from sit transfers with LE management    DGI 9/24    Post Treatment Pain Level (on 0 to 10) scale:   0  / 10     ASSESSMENT    Assessment/Changes in Function:     Justification for Eval Code Complexity:  Patient History (low 0, mod 1-2, high 3-4): frequent falls, latex allergy, allergies, PVD, [place], [place], Hx BLE cellulitis, arthritis, HTN, back pain, Hx skin CA, visual impairment, Hx surgeries  Examination (low 1-2, mod 3+, high 4+):   Clinical Presentation (low stable or uncomplicated, mod evolving or changing, high unstable or unpredictable):   Clinical Decision Making (high DGI 9/24)    See PoC     []  See Progress Note/Recertification   Patient will continue to benefit from skilled PT services to modify and progress therapeutic interventions, address functional mobility deficits, address ROM deficits, address strength deficits, analyze and address soft tissue restrictions, analyze and cue movement patterns, analyze and modify body mechanics/ergonomics and assess and modify postural abnormalities to attain remaining goals.    Progress toward goals / Updated goals:    Goals established, See PoC     PLAN    [x]  Upgrade activities as tolerated YES Continue plan of care   []  Discharge due to :    [x]  Other: Initiate PoC     Therapist: Prachi Rodriguez, PT    Date: 5/30/2017 Time: 3:12 PM

## 2017-05-30 NOTE — PROGRESS NOTES
Beryl Danielle [address]  [facility] PHYSICAL THERAPY  [address], [place], [address] - Phone: [phone]  Fax: [phone] / [address]  Patient Name: Maciej Ying : 1925   Medical   Diagnosis: Right knee pain [M25.561] Treatment Diagnosis: B knee pain, gait instability   Onset Date: chronic     Referral Source: Francoise Mishra MD Start of Care [facility]): 2017   Prior Hospitalization: See medical history Provider #: [id number]   Prior Level of Function: Sedentary, previously amb with [facility]   Comorbidities: frequent falls, latex allergy, allergies, PVD, [place], [place], Hx BLE cellulitis, arthritis, HTN, back pain, Hx skin CA, visual impairment, Hx surgeries   Medications: Verified on Patient Summary List   The Plan of Care and following information is based on the information from the initial evaluation.   ===========================================================================================  Assessment / key information:  Yolanda Delong is a 80 y.o.  female with Dx: Right knee pain [M25.561], signs and symptoms consistent with chronic B knee pain and Hx frequent falls. Patient reports she previously treated knee pain with injections, however last time did not heal and she developed cellulitis. Patient reports she was hospitalized in Dec for cellulitis, is now amb with RW. Patient does report fall 4/15/17 when attempting to hedy bathrobe in standing, now reports she sits down for dressing. Objective: Patient demonstrates impaired gait and posture, decreased B knee AROM and LE strength. Patient is TTP over med and lat joint lines B. Patient also with impaired balance, DGI  indicating significantly increased risk of falls; and required mod A for supine to/from sit for LE management.        AROM   PROM  Strength (1-5)      Left Right Left Right Left Right   Hip Flexion         4- 3+     Abduction seated         3- 3-     Adduction seated         2- 2-   Knee Flexion 103 105 105 112 3+ 4     Extension 0 0     4- 4-   Ankle Plantarflexion                 Dorsiflexion         4- 4   Balance Standard Testing (Eyes Open/Eyes Closed - EO/EC)  Romberg   30\" EO/5\" EC    Stand on Foam  5\" EO/NT\" EC  FOTO score 41 points indicating 59% limitation in functional ability. Patient would benefit from skilled PT to address the below listed impairments. Thank you for your referral.  ===========================================================================================  Eval Complexity: History: HIGH Complexity :3+ comorbidities / personal factors will impact the outcome/ POC Exam:HIGH Complexity : 4+ Standardized tests and measures addressing body structure, function, activity limitation and / or participation in recreation  Presentation: HIGH Complexity : Unstable and unpredictable characteristics  Clinical Decision Making:Other outcome measures DGI  HIGH Overall Complexity:HIGH   Problem List: pain affecting function, decrease ROM, decrease strength, edema affecting function, impaired gait/ balance, decrease ADL/ functional abilitiies, decrease activity tolerance, decrease flexibility/ joint mobility and decrease transfer abilities   Treatment Plan may include any combination of the following: Therapeutic exercise, Therapeutic activities, Neuromuscular re-education, Physical agent/modality, Gait/balance training, Manual therapy, Aquatic therapy, Patient education, Self Care training, Functional mobility training, Home safety training and Stair training  Patient / Family readiness to learn indicated by: asking questions, trying to perform skills and interest  Persons(s) to be included in education: patient (P)  Barriers to Learning/Limitations: None  Measures taken: na   Patient Goal (s): \"get these knees working better\"   Patient self reported health status: good  Rehabilitation Potential: fair   Short Term Goals:  To be accomplished in  2-3 weeks: 1. Patient will demonstrate compliance with HEP for symptom management at home. 2. Patient will improve B knee flexion AROM to 110 deg to improve ease with stair negotiation. 3. Patient will be mod I with bed mobility to improve safety with transfers at home.  Long Term Goals: To be accomplished in  4-6  weeks:  1. Patient will be independent with HEP to self-manage and prevent symptoms upon DC. 2.  Patient will improve FOTO score to 48 points to indicate improved functional status. 3.  Patient will improve DGI score to 13/24 to decrease risk of falls. 4.  Patient will demonstrate ROM EC x30 seconds to improve balance and safety in challenging environments. Frequency / Duration:   Patient to be seen  2  times per week for 4-6  weeks:  Patient / Caregiver education and instruction: self care, activity modification and exercises  G-Codes (GP): Mobility [id number] Current  CL= 60-79%   Goal  CK= 40-59%. The severity rating is based on the Other DGI    Therapist Signature: Maria C Silvestre, PT Date: 8/66/8466   Certification Period: 5/30/17-8/29/17 Time: 3:12 PM   ===========================================================================================  I certify that the above Physical Therapy Services are being furnished while the patient is under my care. I agree with the treatment plan and certify that this therapy is necessary. Physician Signature:        Date:       Time:     Please sign and return to [employer] or you may fax the signed copy to [phone]. Thank you.

## 2017-06-06 NOTE — PROGRESS NOTES
PHYSICAL THERAPY - DAILY TREATMENT NOTE    Patient Name: Aaorn Padilla        Date: 2017  : 1925   YES Patient  Verified  Visit #:   2   of   8  Insurance: Payor: Melisa Shake / Plan: [place] MEDICARE PART A & B / Product Type: Medicare /      In time: 3:00 Out time: 3:40   Total Treatment Time: 40     Medicare Time Tracking (below)   Total Timed Codes (min):  40 1:1 Treatment Time:  40     TREATMENT AREA =  B knees    SUBJECTIVE    Pain Level (on 0 to 10 scale):  6  / 10   Medication Changes/New allergies or changes in medical history, any new surgeries or procedures? NO    If yes, update Summary List   Subjective Functional Status/Changes:  []  No changes reported     \"I was really sore after pressing into the knee           OBJECTIVE    30 min Therapeutic Exercise:  [x]  See flow sheet   Rationale:      increase ROM and increase strength to improve the patients ability to amb with increased ease   10 mins- TA sit<>supine bed mobility   min Patient Education:  YES  Reviewed HEP   []  Progressed/Changed HEP based on:   Issue HEP NV     Other Objective/Functional Measures:    Reported pain with LAQ and marches  Hesitant to perform proper bed mobility  Fatigued with stand hip 3 way, VC for form and posture     Post Treatment Pain Level (on 0 to 10) scale:   6  / 10     ASSESSMENT    Assessment/Changes in Function:     [facility] visit since [facility], no progress noted     []  See Progress Note/Recertification   Patient will continue to benefit from skilled PT services to analyze,, cue,, progress,, modify,, demonstrate,, instruct, and address, movement patterns,, therapeutic interventions,, postural abnormalities,, soft tissue restrictions,, ROM,, strength,, functional mobility,, body mechanics/ergonomics, and home and community integration, to attain remaining goals.    Progress toward goals / Updated goals:    No progress noted     PLAN    []  Upgrade activities as tolerated YES Continue plan of care   []  Discharge due to :    []  Other:      Therapist: Juan Kennedy DPT    Date: 6/6/2017 Time: 3:44 PM   Future Appointments  Date Time Provider Josephine Toledo   6/12/2017 4:30 PM Jose Berkowitz [facility]   6/14/2017 3:00 PM [facility]   6/20/2017 3:00 PM Jose Berkowitz [facility]   6/22/2017 5:00 PM Malissa Hogan [facility]   6/28/2017 3:30 PM [facility]   6/30/2017 3:00 PM [facility]

## 2017-06-12 NOTE — PROGRESS NOTES
PHYSICAL THERAPY - DAILY TREATMENT NOTE    Patient Name: Carlos Desai        Date: 2017  : 1925   YES Patient  Verified  Visit #:   3     Insurance: Payor: VA MEDICARE / Plan: VA MEDICARE PART A & B / Product Type: Medicare /      In time: 4:30 Out time: 5:10   Total Treatment Time: 40     Medicare Time Tracking (below)   Total Timed Codes (min):  40 1:1 Treatment Time:  40     TREATMENT AREA =  B knees    SUBJECTIVE    Pain Level (on 0 to 10 scale):  6  / 10   Medication Changes/New allergies or changes in medical history, any new surgeries or procedures? NO    If yes, update Summary List   Subjective Functional Status/Changes:  []  No changes reported     \"I was sore after last time. I had to ice\"           OBJECTIVE    40 min Therapeutic Exercise:  [x]  See flow sheet   Rationale:      increase ROM, increase strength and amb to improve the patients ability to amb with increased ease      min Patient Education:  YES  Reviewed HEP   []  Progressed/Changed HEP based on:   Cont HEP     Other Objective/Functional Measures:    Pt reports difficulty and knee pain with seated hip flex, not lasting  Good tolerance to step-ups today with //bar assist  x2 UE assist with tap ups today     Post Treatment Pain Level (on 0 to 10) scale:   5  / 10     ASSESSMENT    Assessment/Changes in Function:     No changes reported   []  See Progress Note/Recertification   Patient will continue to benefit from skilled PT services to analyze,, cue,, progress,, modify,, demonstrate,, instruct, and address, movement patterns,, therapeutic interventions,, postural abnormalities,, soft tissue restrictions,, ROM,, strength,, functional mobility,, body mechanics/ergonomics, and home and community integration, to attain remaining goals.    Progress toward goals / Updated goals:    Pt reports cont compliance     PLAN    []  Upgrade activities as tolerated YES Continue plan of care   []  Discharge due to :    []  Other: Therapist: Brigette Rios DPT    Date: 6/12/2017 Time: 5:07 PM   Future Appointments  Date Time Provider Josephine Paris   6/14/2017 3:00 PM [facility]   6/20/2017 3:00 PM Deana Whitaker, [facility]   6/22/2017 5:00 PM Buffy Ford [facility]   6/28/2017 3:30 PM [facility]   6/30/2017 3:00 PM [facility]

## 2017-06-14 NOTE — PROGRESS NOTES
PHYSICAL THERAPY - DAILY TREATMENT NOTE    Patient Name: Uyen Alarcon        Date: 2017  : 1925   YES Patient  Verified  Visit #:   3     Insurance: Payor: VA MEDICARE / Plan: VA MEDICARE PART A & B / Product Type: Medicare /      In time: 3:00 Out time: 3:55   Total Treatment Time: 54     Medicare Time Tracking (below)   Total Timed Codes (min):  55 1:1 Treatment Time:  55     TREATMENT AREA =  Right knee pain [M25.561]    SUBJECTIVE    Pain Level (on 0 to 10 scale):  5  / 10   Medication Changes/New allergies or changes in medical history, any new surgeries or procedures? NO     If yes, update Summary List   Subjective Functional Status/Changes:  []  No changes reported     \"I have a lot of pain in my knees but it seems to feel better when I come here. \"          OBJECTIVE    30 min Therapeutic Exercise:  [x]  See flow sheet   Rationale:      increase ROM and increase strength to improve the patients ability to improve efficiency with ambulation and bed mobility. 25 min Therapeutic Activity: Gait training with head turns, relative SLS with hip 3-way   Rationale: To increase safety and efficiency with ADL's.     min Patient Education:  YES  Reviewed HEP   []  Progressed/Changed HEP based on:   Patient reports compliance     Other Objective/Functional Measures:    Pt was unable to perform tap-up's to the second step due to hip flexion weakness but had no increase in symptoms. Pt still continued to perform isometric hip flexion to improve efficiency with this activity. Pt was able to perform hip 3-way with 1 finger support B but needed a lot of encouragement and SBA for balance.      Post Treatment Pain Level (on 0 to 10) scale:   5  / 10     ASSESSMENT    Assessment/Changes in Function:     Pt presents to PT progressing in a LE strengthening and dynamic balance program.     []  See Progress Note/Recertification   Patient will continue to benefit from skilled PT services to modify and progress therapeutic interventions, address functional mobility deficits, address strength deficits, analyze and address soft tissue restrictions, analyze and cue movement patterns, analyze and modify body mechanics/ergonomics and assess and modify postural abnormalities to attain remaining goals. Progress toward goals / Updated goals: · Short Term Goals: To be accomplished in 2-3 weeks:  1. Patient will demonstrate compliance with HEP for symptom management at home. 2. Patient will improve B knee flexion AROM to 110 deg to improve ease with stair negotiation. Addressed with heel slides. 3. Patient will be mod I with bed mobility to improve safety with transfers at home. Addressed with bridges. · Long Term Goals: To be accomplished in 4-6 weeks:  1. Patient will be independent with HEP to self-manage and prevent symptoms upon DC. 2. Patient will improve FOTO score to 48 points to indicate improved functional status. 3. Patient will improve DGI score to 13/24 to decrease risk of falls. Addressed with dynamic gait activities described above. 4. Patient will demonstrate ROM EC x30 seconds to improve balance and safety in challenging environments.      PLAN    [x]  Upgrade activities as tolerated YES Continue plan of care   []  Discharge due to :    []  Other:      Therapist: Sandeep Patel    Date: 6/14/2017 Time: 6:30 PM     Future Appointments  Date Time Provider Josephine Toledo   6/20/2017 3:00 PM Eileen Forbes [facility]   6/22/2017 5:00 PM Jimmy Ely [facility]   6/28/2017 3:30 PM Sandeep Patel [facility]   6/30/2017 3:00 PM [address] [place] [address]

## 2017-06-20 NOTE — PROGRESS NOTES
PHYSICAL THERAPY - DAILY TREATMENT NOTE    Patient Name: Gregorio Zamora        Date: 2017  : 1925   YES Patient  Verified  Visit #:   5     Insurance: Payor: Vinny Alejandro / Plan: VA MEDICARE PART A & B / Product Type: Medicare /      In time: 3:00 Out time: 3:40   Total Treatment Time: 40     Medicare Time Tracking (below)   Total Timed Codes (min):  40 1:1 Treatment Time:  40     TREATMENT AREA =  B knees    SUBJECTIVE    Pain Level (on 0 to 10 scale):  5  / 10   Medication Changes/New allergies or changes in medical history, any new surgeries or procedures? NO    If yes, update Summary List   Subjective Functional Status/Changes:  []  No changes reported     \"These knees are always bothering me\"          OBJECTIVE    40 min Therapeutic Exercise:  [x]  See flow sheet   Rationale:      increase ROM and increase strength to improve the patients ability to amb and negt environment with increased ease      min Patient Education:  YES  Reviewed HEP   []  Progressed/Changed HEP based on:   Cont HEP     Other Objective/Functional Measures:    Progressed reps per flow sheet  Unable to maintain MSRhom with R foot forward, 30 sec on L       Post Treatment Pain Level (on 0 to 10) scale:   5  / 10     ASSESSMENT    Assessment/Changes in Function:     Slow progress     []  See Progress Note/Recertification   Patient will continue to benefit from skilled PT services to analyze,, cue,, progress,, modify,, demonstrate,, instruct, and address, movement patterns,, therapeutic interventions,, postural abnormalities,, soft tissue restrictions,, ROM,, strength,, functional mobility,, body mechanics/ergonomics, and home and community integration, to attain remaining goals. Progress toward goals / Updated goals:   Added balance today for LTG 4     PLAN    []  Upgrade activities as tolerated YES Continue plan of care   []  Discharge due to :    []  Other:      Therapist: Mauricio England DPT    Date: 2017 Time: 3:46 PM   Future Appointments  Date Time Provider Josephine Toledo   6/22/2017 5:00 PM Ta Escalante PT [facility]   6/28/2017 3:30 PM Isaura Gleason [facility]   6/30/2017 3:00 PM [address] [place] [address]

## 2017-06-22 NOTE — PROGRESS NOTES
PHYSICAL THERAPY - DAILY TREATMENT NOTE    Patient Name: Maciej Ying        Date: 2017  : 1925   YES Patient  Verified  Visit #:     Insurance: Payor: VA MEDICARE / Plan: VA MEDICARE PART A & B / Product Type: Medicare /      In time: 4:40 Out time: 5:30   Total Treatment Time: 50     Medicare Time Tracking (below)   Total Timed Codes (min):  50 1:1 Treatment Time:  40     TREATMENT AREA =  Right knee pain [M25.561]    SUBJECTIVE    Pain Level (on 0 to 10 scale):  5  / 10   Medication Changes/New allergies or changes in medical history, any new surgeries or procedures? NO     If yes, update Summary List   Subjective Functional Status/Changes:  []  No changes reported     \"I don't think the arthritis will ever go away. \"          OBJECTIVE    33 (23) min Therapeutic Exercise:  [x]  See flow sheet   Rationale:      increase ROM, increase strength, improve coordination, improve balance and increase proprioception to improve the patients ability to perform ADL\"s and amb with decreased pain and improved ease      (17) min Therapeutic Activity: Tap-ups, MSR EO, ROM EC, minisquat, sit to stand   Rationale:   improve coordination, improve balance and decrease symptoms to improve the patients ability to perform ADLs/IADLs, functional mobility and gait safely and independently without increased symptoms       min Patient Education:  YES  Reviewed HEP   []  Progressed/Changed HEP based on:   Patient reports compliance     Other Objective/Functional Measures:    Patient with difficulty with LAQ's had to perform 2x5 reps on R, able to perform 10x on L without rest break  C/o R>L knee pain throughout  Able to progress balance ex's per flowsheet     Post Treatment Pain Level (on 0 to 10) scale:   5  / 10     ASSESSMENT    Assessment/Changes in Function:     Patient is progressing slowly towards goals at this time     []  See Progress Note/Recertification   Patient will continue to benefit from skilled PT services to modify and progress therapeutic interventions, address functional mobility deficits, address ROM deficits, address strength deficits, analyze and address soft tissue restrictions, analyze and cue movement patterns, analyze and modify body mechanics/ergonomics and assess and modify postural abnormalities to attain remaining goals. Progress toward goals / Updated goals: · Short Term Goals: To be accomplished in 2-3 weeks:  1. Patient will demonstrate compliance with HEP for symptom management at home. Reports compliance  2. Patient will improve B knee flexion AROM to 110 deg to improve ease with stair negotiation. Addressed with HS curls  3. Patient will be mod I with bed mobility to improve safety with transfers at home. Addressed with bridges. , min A this visit  · Long Term Goals: To be accomplished in 4-6 weeks:  1. Patient will be independent with HEP to self-manage and prevent symptoms upon DC. 2. Patient will improve FOTO score to 48 points to indicate improved functional status. 3. Patient will improve DGI score to 13/24 to decrease risk of falls. Addressed with dynamic gait activities described above. 4. Patient will demonstrate ROM EC x30 seconds to improve balance and safety in challenging environments.  10\", progressing     PLAN    [x]  Upgrade activities as tolerated YES Continue plan of care   []  Discharge due to :    []  Other:      Therapist: Elvie Aggarwal PT    Date: 6/22/2017 Time: 4:37 PM     Future Appointments  Date Time Provider Josephine Toledo   6/22/2017 5:00 PM Elvie Aggarwal PT [facility]   6/28/2017 3:30 PM [address] [place] [address]   6/30/2017 3:00 PM [address] [place] [address]

## 2017-06-28 NOTE — PROGRESS NOTES
PHYSICAL THERAPY - DAILY TREATMENT NOTE    Patient Name: Stefany Landers        Date: 2017  : 1925   YES Patient  Verified  Visit #:     Insurance: Payor: VA MEDICARE / Plan: VA MEDICARE PART A & B / Product Type: Medicare /      In time: 3:25 Out time: 4:10   Total Treatment Time: 39     Medicare Time Tracking (below)   Total Timed Codes (min):  45 1:1 Treatment Time:  45     TREATMENT AREA =  Right knee pain [M25.561]    SUBJECTIVE    Pain Level (on 0 to 10 scale):  5  / 10   Medication Changes/New allergies or changes in medical history, any new surgeries or procedures? NO     If yes, update Summary List   Subjective Functional Status/Changes:  []  No changes reported     \"I want to work on walking with a cane\"          OBJECTIVE    20 min Therapeutic Exercise:  [x]  See flow sheet   Rationale:      increase ROM and increase strength to improve the patients ability to ambulate with improved stability. 25 min Therapeutic Activity: Gait training 2 x 30 ft with LBQC, mini-squats with B 1 finger support   Rationale: To increase safety and efficiency with ADL's.     min Patient Education:  YES  Reviewed HEP   []  Progressed/Changed HEP based on:   Patient reports compliance     Other Objective/Functional Measures:    Pt could perform mini-squats with 1 finger assist with better form than in previous visits. Pt required CGA when ambulating with the [facility] and vc's.      Post Treatment Pain Level (on 0 to 10) scale:   5  / 10     ASSESSMENT    Assessment/Changes in Function:     Pt presents to PT progressing slowly in safety with ambulation and other household ADL's.     []  See Progress Note/Recertification   Patient will continue to benefit from skilled PT services to modify and progress therapeutic interventions, address functional mobility deficits, address ROM deficits, address strength deficits, analyze and address soft tissue restrictions, analyze and cue movement patterns and analyze and modify body mechanics/ergonomics to attain remaining goals. Progress toward goals / Updated goals: Will reassess NV.      PLAN    [x]  Upgrade activities as tolerated YES Continue plan of care   []  Discharge due to :    []  Other:      Therapist: Monico Mortimer    Date: 6/28/2017 Time: 6:35 PM     Future Appointments  Date Time Provider Josephine Toledo   6/30/2017 3:00 PM Monico Mortimer [facility]   7/5/2017 4:30 PM Charbel Young PT [facility]   7/12/2017 3:30 PM Charbel Young PT [facility]   7/25/2017 3:00 PM Monico Mortimer [facility]   7/27/2017 3:00 PM [address] [place] [address]

## 2017-06-30 NOTE — PROGRESS NOTES
PHYSICAL THERAPY - DAILY TREATMENT NOTE    Patient Name: Valdemar Trevino        Date: 2017  : 1925   YES Patient  Verified  Visit #:     Insurance: Payor: [place] MED[place] / Plan: [place] MEDICARE PART A & B / Product Type: Medicare /      In time: 3:00 Out time: 3:35   Total Treatment Time: 35     Medicare Time Tracking (below)   Total Timed Codes (min):  35 1:1 Treatment Time:  35     TREATMENT AREA =  Right knee pain [M25.561]    SUBJECTIVE    Pain Level (on 0 to 10 scale):    / 10   Medication Changes/New allergies or changes in medical history, any new surgeries or procedures? NO     If yes, update Summary List   Subjective Functional Status/Changes:  []  No changes reported     \"My knees are never going to feel better. \"          OBJECTIVE    20 min Therapeutic Exercise:  [x]  See flow sheet   Rationale:      increase ROM and increase strength to improve the patients ability to perform ADL's with improved core stability. 15 min Therapeutic Activity: Relative SLS with tap-ups, gait training with SPC 2 x 30 ft   Rationale: To  increase safety and efficiency with ADL's.     min Patient Education:  YES  Reviewed HEP   []  Progressed/Changed HEP based on:   Patient reports compliance     Other Objective/Functional Measures:    Pt drags R LE, but demonstrates no LOB when ambulating with a SPC, despite need of CGA. Pt was able to progress in tap-ups but required a rest break due to fatigue and hip flexor weakness. Pt can hip 3-way and mini-squat with 1 finger assisst B. Post Treatment Pain Level (on 0 to 10) scale:   5  / 10     ASSESSMENT    Assessment/Changes in Function:     Pt presents to PT able to progress in a balance and LE strengthening protocol. PRogressing in balance indicated above.      []  See Progress Note/Recertification   Patient will continue to benefit from skilled PT services to modify and progress therapeutic interventions, address functional mobility deficits, address ROM deficits, address strength deficits, analyze and address soft tissue restrictions, analyze and cue movement patterns, analyze and modify body mechanics/ergonomics and assess and modify postural abnormalities to attain remaining goals. Progress toward goals / Updated goals: Will reassess NV.      PLAN    [x]  Upgrade activities as tolerated YES Continue plan of care   []  Discharge due to :    []  Other:      Therapist: Jeremy Ortez    Date: 6/30/2017 Time: 4:08 PM     Future Appointments  Date Time Provider Josephine Toledo   7/5/2017 4:30 PM Charbel Loza PT [facility]   7/12/2017 3:30 PM Charbel Loza PT [facility]   7/25/2017 3:00 PM Jeremy Ortez [facility]   7/27/2017 3:00 PM [address] [place] [address]

## 2017-07-05 NOTE — PROGRESS NOTES
Beryl Danielle [address]  [facility] PHYSICAL THERAPY  [address] [facility] [address], [address] - Phone: [phone]  Fax: [phone] OF CARE/RECERTIFICATION FOR PHYSICAL THERAPY          Patient Name: Denise Bang : 1925   Treatment/Medical Diagnosis: Right knee pain [M25.561]   Onset Date: Chronic    Referral Source: Machelle Solis MD Start of Care [facility]): 17   Prior Hospitalization: See Medical History Provider #: [id number]   Prior Level of Function: Sedentary, previously amb with SPC   Comorbidities: frequent falls, latex allergy, allergies, PVD, Lime, DIAMOND, Hx BLE cellulitis, arthritis, HTN, back pain, Hx skin CA, visual impairment, Hx surgeries   Medications: Verified on Patient Summary List   Visits from [facility]: 9 Missed Visits: 0     Goal/Measure of Progress Goal Met? 1. Patient will demonstrate compliance with HEP for symptom management at home. Status at last Eval: NA Current Status: compliant yes   2. Patient will improve B knee flexion AROM to 110 deg to improve ease with stair negotiation. Status at last Eval: 105 R 103 L Current Status: 110 B yes   3. Patient will demonstrate ROM EC x30 seconds to improve balance and safety in challenging environments. Status at last Eval: 5 sec Current Status: 30 sec yes     Therapy has consisted of active warm-up on the bike, step-up's, tap-up's to improve hip flexion AAROM, mini-squat training, standing and mat strengthening exercises. Key Functional Changes/Progress: Pt's FOTO score improved 2 points to 43 indicating 57% limitation in functional ability. The pt has improved in knee flexion AAROM, however the pt still cannot sit<>stand more than 1-2x due to pain, despite performance of this skill with appropriate form. The pt has been gait training with a SPC, for which the pt requires CGA.  The pt is improving in ambulation with the [facility], with less need for vc's; however, the pt still is not safe enough to ambulate with the [facility] in her home. Problem List: pain affecting function, decrease ROM, decrease strength, impaired gait/ balance, decrease ADL/ functional abilitiies, decrease activity tolerance, decrease flexibility/ joint mobility and decrease transfer abilities   Treatment Plan may include any combination of the following: Therapeutic exercise, Therapeutic activities, Neuromuscular re-education, Physical agent/modality, Gait/balance training, Manual therapy, Aquatic therapy, Patient education, Self Care training, Functional mobility training, Home safety training and Stair training  Patient Goal(s) has been updated and includes: \"To be able to walk with the [facility] instead of the walker. \"    Goals for this certification period include and are to be achieved in   3-4  weeks:  1. Patient will be independent with HEP to self-manage and prevent symptoms upon DC. 2.   Patient will improve DGI score to 13/24 to decrease risk of falls. 3.   Patient will improve FOTO score to 48 points to indicate improved functional status. Frequency / Duration:   Patient to be seen   2-3   times per week for   3-4    weeks:  G-Codes (GP): Mobility [id number] Current  CL= 60-79%   Goal  CK= 40-59%. The severity rating is based on the Other DGI  Assessments/Recommendations: The patient would continue to benefit from continued skilled interventions to treat the above functional deficits. If you have any questions/comments please contact us directly at [phone]. Thank you for allowing us to assist in the care of your patient. Therapist Signature: Lito Núñez DPT Date: 2/5/8197   Certification Period:  Reporting Period: 5/30/17-8/29/17 5/30/17 - 7/5/17 Time: 6:24 PM   NOTE TO PHYSICIAN:  PLEASE COMPLETE THE ORDERS BELOW AND FAX TO   [facility] Physical Therapy: [phone].   If you are unable to process this request in 24 hours please contact our office: [phone].    ___ I have read the above report and request that my patient continue as recommended.   ___ I have read the above report and request that my patient continue therapy with the following changes/special instructions: ________________________________________________   ___ I have read the above report and request that my patient be discharged from therapy.      Physician Signature:        Date:       Time:

## 2017-07-05 NOTE — PROGRESS NOTES
PHYSICAL THERAPY - DAILY TREATMENT NOTE    Patient Name: Michael Boston        Date: 2017  : 1925   YES Patient  Verified  Visit #:     Insurance: Payor: Oralia Jamal / Plan: [place] MEDICARE PART A & B / Product Type: Medicare /      In time: 4:30 Out time: 5:30   Total Treatment Time: 60     Medicare Time Tracking (below)   Total Timed Codes (min):  60 1:1 Treatment Time:  60     TREATMENT AREA =  Right knee pain [M25.561]    SUBJECTIVE    Pain Level (on 0 to 10 scale):  5  / 10   Medication Changes/New allergies or changes in medical history, any new surgeries or procedures? NO     If yes, update Summary List   Subjective Functional Status/Changes:  []  No changes reported     \"I don't think my knees are going to get any better. I can work on walking with the cane though. \"          OBJECTIVE    25 min Therapeutic Exercise:  [x]  See flow sheet   Rationale:      increase ROM and increase strength to improve the patients ability to perform ADL's with improved LE flexibility and stability. 35 min Therapeutic Activity: DGI, mini-squats   Rationale: To perform ADL's with improved safety and efficiency. min Patient Education:  YES  Reviewed HEP   []  Progressed/Changed HEP based on:   Patient reports compliance     Other Objective/Functional Measures:    See PN. Post Treatment Pain Level (on 0 to 10) scale:   5  / 10     ASSESSMENT    Assessment/Changes in Function:     See PN.     []  See Progress Note/Recertification   Patient will continue to benefit from skilled PT services to modify and progress therapeutic interventions, address functional mobility deficits, address ROM deficits, address strength deficits, analyze and address soft tissue restrictions, analyze and cue movement patterns and analyze and modify body mechanics/ergonomics to attain remaining goals. Progress toward goals / Updated goals:    See PN.      PLAN    [x]  Upgrade activities as tolerated YES Continue plan of care   []  Discharge due to :    []  Other:      Therapist: Hector Mcadams    Date: 7/5/2017 Time: 6:14 PM     Future Appointments  Date Time Provider Josephine Toledo   7/12/2017 3:30 PM Joyce Parish [facility]   7/25/2017 3:00 PM [address] [place] [address]   7/27/2017 3:00 PM [address] [place] [address]

## 2017-07-09 NOTE — ED TRIAGE NOTES
Pt c/o left hand pain and swelling- pt has hx of gout and state that she woke up this morning and her hand was red and swollen.

## 2017-07-10 NOTE — DISCHARGE INSTRUCTIONS
SPECIFIC PATIENT INSTRUCTIONS FROM THE PHYSICIAN WHO TREATED YOU IN THE ER TODAY:  1. Return if any concerns or worsening of condition(s)  2. Keflex and Bactrim DS as prescribed until finished. 3. IF Norco was prescribed, take the vicodin for pain not controlled by over the counter ibuprofen. 4. Follow up with your primary doctor in 2 days or reevaluation in the ER in 48 hours. Cellulitis: Care Instructions  Your Care Instructions    Cellulitis is a skin infection. It often occurs after a break in the skin from a scrape, cut, bite, or puncture, or after a rash. The doctor has checked you carefully, but problems can develop later. If you notice any problems or new symptoms, get medical treatment right away. Follow-up care is a key part of your treatment and safety. Be sure to make and go to all appointments, and call your doctor if you are having problems. It's also a good idea to know your test results and keep a list of the medicines you take. How can you care for yourself at home? · Take your antibiotics as directed. Do not stop taking them just because you feel better. You need to take the full course of antibiotics. · Prop up the infected area on pillows to reduce pain and swelling. Try to keep the area above the level of your heart as often as you can. · If your doctor told you how to care for your wound, follow your doctor's instructions. If you did not get instructions, follow this general advice:  ¨ Wash the wound with clean water 2 times a day. Don't use hydrogen peroxide or alcohol, which can slow healing. ¨ You may cover the wound with a thin layer of petroleum jelly, such as Vaseline, and a nonstick bandage. ¨ Apply more petroleum jelly and replace the bandage as needed. · Be safe with medicines. Take pain medicines exactly as directed. ¨ If the doctor gave you a prescription medicine for pain, take it as prescribed.   ¨ If you are not taking a prescription pain medicine, ask your doctor if you can take an over-the-counter medicine. To prevent cellulitis in the future  · Try to prevent cuts, scrapes, or other injuries to your skin. Cellulitis most often occurs where there is a break in the skin. · If you get a scrape, cut, mild burn, or bite, wash the wound with clean water as soon as you can to help avoid infection. Don't use hydrogen peroxide or alcohol, which can slow healing. · If you have swelling in your legs (edema), support stockings and good skin care may help prevent leg sores and cellulitis. · Take care of your feet, especially if you have diabetes or other conditions that increase the risk of infection. Wear shoes and socks. Do not go barefoot. If you have athlete's foot or other skin problems on your feet, talk to your doctor about how to treat them. When should you call for help? Call your doctor now or seek immediate medical care if:  · You have signs that your infection is getting worse, such as:  ¨ Increased pain, swelling, warmth, or redness. ¨ Red streaks leading from the area. ¨ Pus draining from the area. ¨ A fever. · You get a rash. Watch closely for changes in your health, and be sure to contact your doctor if:  · You are not getting better after 1 day (24 hours). · You do not get better as expected. Where can you learn more? Go to http://david-luanne.info/. Angel Valles in the search box to learn more about \"Cellulitis: Care Instructions. \"  Current as of: October 13, 2016  Content Version: 11.3  © 8181-4608 [employer]. Care instructions adapted under license by [employer] (which disclaims liability or warranty for this information). If you have questions about a medical condition or this instruction, always ask your healthcare professional. [address] any warranty or liability for your use of this information. [employer] Activation    Thank you for requesting access to [employer].  Please follow the instructions below to securely access and download your online medical record. [employer] allows you to send messages to your doctor, view your test results, renew your prescriptions, schedule appointments, and more. How Do I Sign Up? 1. In your internet browser, go to [email or web address]. 2. Click on the First Time User? Click Here link in the Sign In box. You will see the New Member Sign Up page. 3. Enter your [employer] Access Code exactly as it appears below. You will not need to use this code after youve completed the sign-up process. If you do not sign up before the expiration date, you must request a new code. [employer] Access Code: [email or web address]  Expires: 2017 10:50 PM (This is the date your [employer] access code will )    4. Enter the last four digits of your Social Security Number (xxxx) and Date of Birth (mm/dd/yyyy) as indicated and click Submit. You will be taken to the next sign-up page. 5. Create a [employer] ID. This will be your [employer] login ID and cannot be changed, so think of one that is secure and easy to remember. 6. Create a [employer] password. You can change your password at any time. 7. Enter your Password Reset Question and Answer. This can be used at a later time if you forget your password. 8. Enter your e-mail address. You will receive e-mail notification when new information is available in [address] 9. Click Sign Up. You can now view and download portions of your medical record. 10. Click the Download Summary menu link to download a portable copy of your medical information. Additional Information    If you have questions, please visit the Frequently Asked Questions section of the [employer] website at [email or web address] Remember, [employer] is NOT to be used for urgent needs. For medical emergencies, dial 911.

## 2017-07-10 NOTE — ED PROVIDER NOTES
[facility] EMERGENCY DEPT      8:07 PM Yolanda Lynch is a 80 y.o. female with a history of A-Fib, Gout, and Cellulitis who presents to the emergency department c/o L hand swelling onset this morning. The patient explains she has gout in her hand, and initially thought it was a flare up. Pt states she called her pcp and was told to come to the ED. Pt notes being on fluid pills, but is being taken off of it due to concern of her kidneys. Pt also states she went to a hand doctor and had the hand drained, but was told it would come back. Pt denies using any new products or creams. No other concerns at this time. PCP: Francisco Gudino MD       No current facility-administered medications for this encounter. Current Outpatient Prescriptions   Medication Sig    Potassium Citrate 15 mEq TbER Take  by mouth.  febuxostat (ULORIC) 40 mg tab tablet Take 40 mg by mouth daily.  traMADol (ULTRAM) 50 mg tablet Take 1 Tab by mouth every six (6) hours as needed. Max Daily Amount: 200 mg.    dilTIAZem XR (DILACOR XR) 120 mg XR capsule Take  by mouth daily.  levothyroxine (SYNTHROID) 88 mcg tablet Take 88 mcg by mouth Daily (before breakfast).  simvastatin (ZOCOR) 20 mg tablet Take 20 mg by mouth nightly.  telmisartan (MICARDIS) 80 mg tablet Take 80 mg by mouth daily.  rivaroxaban (XARELTO) 15 mg tab tablet Take 15 mg by mouth daily (with breakfast).  furosemide (LASIX) 40 mg tablet Take 40 mg by mouth daily.  ergocalciferol (ERGOCALCIFEROL) 50,000 unit capsule Take 400 Units by mouth daily.  multivitamin, tx-iron-ca-min (THERA-M W/ IRON) 9 mg iron-400 mcg tab tablet Take 1 Tab by mouth daily.  Flaxseed Oil oil by Does Not Apply route.  metoprolol tartrate (LOPRESSOR) 50 mg tablet Take 100 mg by mouth two (2) times a day.  digoxin (LANOXIN) 0.125 mg tablet Take 0.0625 mg by mouth daily.  colchicine 0.6 mg tablet Take 0.3 mg by mouth daily.     allopurinol (ZYLOPRIM) 100 mg tablet Take 100 mg by mouth daily. Past Medical History:   Diagnosis Date    Atrial fibrillation (HCC)     Cellulitis     Gout     Sleep apnea        Past Surgical History:   Procedure Laterality Date    HX HEART CATHETERIZATION         Family History   Problem Relation Age of Onset    Breast Cancer Child 39    Breast Cancer Maternal Grandmother        Social History     Social History    Marital status:      Spouse name: N/A    Number of children: N/A    Years of education: N/A     Occupational History    Not on file. Social History Main Topics    Smoking status: Never Smoker    Smokeless tobacco: Never Used    Alcohol use No    Drug use: No    Sexual activity: Not on file     Other Topics Concern    Not on file     Social History Narrative       Allergies   Allergen Reactions    Augmentin [Amoxicillin-Pot Clavulanate] Unknown (comments)    Biaxin [Clarithromycin] Unknown (comments)    Clindamycin Rash    Codeine Unknown (comments)    Doxycycline Diarrhea    Procardia [Nifedipine] Unknown (comments)    Trimox [Amoxicillin] Unknown (comments)       Patient's primary care provider (as noted in EPIC):  DOTTIE HUERTA MD    REVIEW OF SYSTEMS:    Constitutional:  Negative for diaphoresis. HENT:  Negative for congestion. Respiratory:  Negative for cough and shortness of breath. Cardiovascular:  Negative for chest pain and palpitations. Gastrointestinal:  Negative for diarrhea. Genitourinary:  Negative for flank pain. Skin:  Negative for pallor. Neurological:  Negative for weakness. Psychiatric:  Negative for hallucinations. Visit Vitals    /82    Pulse (!) 56    Temp 98 °F (36.7 °C)    Resp 15    Wt 64.4 kg (142 lb)    SpO2 97%    BMI 28.68 kg/m2       PHYSICAL EXAM:    CONSTITUTIONAL:  Alert, in no apparent distress;  well developed;  well nourished. HEAD:  Normocephalic, atraumatic. EYES:  EOMI. Non-icteric sclera. Normal conjunctiva.   ENTM:  Nose:  no rhinorrhea. Throat:  no erythema or exudate, mucous membranes moist.  NECK:  No JVD. Supple  RESPIRATORY:  Chest clear, equal breath sounds, good air movement. CARDIOVASCULAR:  Regular rate and rhythm. No murmurs, rubs, or gallops. GI:  Normal bowel sounds, abdomen soft and non-tender. No rebound or guarding. BACK:  Non-tender. UPPER EXT:  Normal inspection. LOWER EXT:  No edema, no calf tenderness. Distal pulses intact. NEURO:  Moves all four extremities, and grossly normal motor exam.  SKIN:  No rashes;  Normal for age. PSYCH:  Alert and normal affect. Area of cellulitis:  Left dorsal hand has redness, warmth, and mild TTP c/w cellulitis. There is swelling from prior gout deposition on dorsal hand. No fluctuance/abscess. DIFFERENTIAL DIAGNOSES/ MEDICAL DECISION MAKING:  Urticaria, viral rash, contact dermatitis, bacterial infection, fungal/ candidal rash, or symptom of underlying disease, connective tissue disorder, vasculitis (to include ITP and TTP), versus other etiologies or a combination of the above. Abnormal lab results from this emergency department encounter:  Labs Reviewed   CBC WITH AUTOMATED DIFF - Abnormal; Notable for the following:        Result Value    RBC 3.47 (*)     HGB 11.1 (*)     HCT 33.4 (*)     RDW 16.0 (*)     LYMPHOCYTES 11 (*)     MONOCYTES 16 (*)     ABS.  LYMPHOCYTES 0.6 (*)     All other components within normal limits   METABOLIC PANEL, BASIC - Abnormal; Notable for the following:     Sodium 135 (*)     BUN 27 (*)     Creatinine 1.45 (*)     GFR est AA 41 (*)     GFR est non-AA 34 (*)     All other components within normal limits       Lab values for this patient within approximately the last 12 hours:  Recent Results (from the past 12 hour(s))   EKG, 12 LEAD, INITIAL    Collection Time: 07/09/17  7:42 PM   Result Value Ref Range    Ventricular Rate 52 BPM    Atrial Rate 48 BPM    QRS Duration 100 ms    Q-T Interval 432 ms    QTC Calculation (Bezet) 401 ms Calculated R Axis -37 degrees    Calculated T Axis 1 degrees    Diagnosis       Atrial fibrillation with slow ventricular response  Left axis deviation  Incomplete right bundle branch block  Nonspecific ST abnormality  Abnormal ECG  When compared with ECG of 26-NOV-2016 20:41,  Previous ECG has undetermined rhythm, needs review  Incomplete right bundle branch block has replaced Nonspecific   intraventricular block  Nonspecific T wave abnormality, improved in Lateral leads     CBC WITH AUTOMATED DIFF    Collection Time: 07/09/17  8:20 PM   Result Value Ref Range    WBC 5.8 4.6 - 13.2 K/uL    RBC 3.47 (L) 4.20 - 5.30 M/uL    HGB 11.1 (L) 12.0 - 16.0 g/dL    HCT 33.4 (L) 35.0 - 45.0 %    MCV 96.3 74.0 - 97.0 FL    MCH 32.0 24.0 - 34.0 PG    MCHC 33.2 31.0 - 37.0 g/dL    RDW 16.0 (H) 11.6 - 14.5 %    PLATELET 485 443 - 447 K/uL    MPV 11.4 9.2 - 11.8 FL    NEUTROPHILS 70 40 - 73 %    LYMPHOCYTES 11 (L) 21 - 52 %    MONOCYTES 16 (H) 3 - 10 %    EOSINOPHILS 2 0 - 5 %    BASOPHILS 1 0 - 2 %    ABS. NEUTROPHILS 4.1 1.8 - 8.0 K/UL    ABS. LYMPHOCYTES 0.6 (L) 0.9 - 3.6 K/UL    ABS. MONOCYTES 0.9 0.05 - 1.2 K/UL    ABS. EOSINOPHILS 0.1 0.0 - 0.4 K/UL    ABS. BASOPHILS 0.0 0.0 - 0.06 K/UL    DF AUTOMATED     METABOLIC PANEL, BASIC    Collection Time: 07/09/17  8:20 PM   Result Value Ref Range    Sodium 135 (L) 136 - 145 mmol/L    Potassium PENDING mmol/L    Chloride 103 100 - 108 mmol/L    CO2 25 21 - 32 mmol/L    Anion gap 7 3.0 - 18 mmol/L    Glucose 95 74 - 99 mg/dL    BUN 27 (H) 7.0 - 18 MG/DL    Creatinine 1.45 (H) 0.6 - 1.3 MG/DL    BUN/Creatinine ratio 19 12 - 20      GFR est AA 41 (L) >60 ml/min/1.73m2    GFR est non-AA 34 (L) >60 ml/min/1.73m2    Calcium 9.5 8.5 - 10.1 MG/DL       Radiologist and cardiologist interpretations if available at time of this note:  No results found.     Medication(s) ordered for patient during this emergency visit encounter:  Medications   piperacillin-tazobactam (ZOSYN) 3.375 g in 0.9% sodium chloride (MBP/ADV) 100 mL MBP (0 g IntraVENous IV Completed 7/9/17 2119)       ED COURSE:     IMPRESSION AND MEDICAL DECISION MAKING:  Based upon the patient's presentation with noted HPI and PE, along with the work up done in the emergency department, I believe that the patient is having noted cellulitis. Will treat with antibiotics which include coverage for possible MRSA. DIAGNOSIS:  1. Cellulitis. SPECIFIC PATIENT INSTRUCTIONS FROM THE PHYSICIAN WHO TREATED YOU IN THE ER TODAY:  1. Return if any concerns or worsening of condition(s)  2. Keflex and Bactrim DS as prescribed until finished. 3. IF Norco was prescribed, take the vicodin for pain not controlled by over the counter ibuprofen. 4. Follow up with your primary doctor in 2 days or reevaluation in the ER in 48 hours. Bladimir Day M.D. Provider Attestation:  If a scribe was utilized in generation of this patient record, I personally performed the services described in the documentation, reviewed the documentation, as recorded by the scribe in my presence, and it accurately records the patient's history of presenting illness, review of systems, patient physical examination, and procedures performed by me as the attending physician. Bladimir Day M.D. [facility] Board Certified Emergency Physician  7/9/2017.  8:06 PM    [address] for and in presence of Becky Medina MD (07/09/17)      Physician Attestation  I personally preformed the services described in this documentation, reviewed and edited the documentation which was dictated to the scribe in my presence, and it accurately records my words and actions.      Becky Medina MD (07/09/17)      Signed by: Claude Man, 07/09/17, 8:07 PM

## 2017-07-10 NOTE — ED NOTES
Dr. Lashon Peres at bedside marking skin infection on left hand with skin marker. Dr. Lashon Peres giving education on monitoring wound/infection.  Patient and daughter verbalizing understanding

## 2017-07-27 NOTE — PROGRESS NOTES
Beryl Danielle [address]  [facility] PHYSICAL THERAPY  [address], [place], [place] [address] - Phone: [phone]  Fax: [phone]  DISCHARGE SUMMARY FOR PHYSICAL THERAPY          Patient Name: Clover Dakin : 1925   Treatment/Medical Diagnosis: Right knee pain [M25.561]   Onset Date: Chronic     Referral Source: Carlton Smith MD [facility]): 17   Prior Hospitalization: See Medical History Provider #: [id number]   Prior Level of Function: Sedentary, previously amb with [facility]   Comorbidities: frequent falls, latex allergy, allergies, PVD, [place], [place], Hx BLE cellulitis, arthritis, HTN, back pain, Hx skin CA, visual impairment, Hx surgeries   Medications: Verified on Patient Summary List   Visits from [facility]:         10 Missed Visits:        1          Goal/Measure of Progress Goal Met? 1. Patient will be independent with HEP to self-manage and prevent symptoms upon DC. Status at last Eval: NA Current Status: independent yes   2. Patient will improve DGI score to 13/24 to decrease risk of falls. Status at last Eval: 10 Current Status: 12 Progressing   3. Patient will improve FOTO score to 48 points to indicate improved functional status. Status at last Eval: 43 Current Status: 41 no     Key Functional Changes/Progress: Pt's FOTO score decreased since her last score indicating 59% limitation in functional ability. The pt is more efficient with sit<>stand transfers, but has otherwise made limited progress    G-Codes (GP): Mobility:   Goal  CK= 40-59%  D/C  CL= 60-79%. The severity rating is based on the FOTO Score    Assessments/Recommendations: Discontinue therapy due to lack of appreciable progress towards goals. If you have any questions/comments please contact us directly at [phone]. Thank you for allowing us to assist in the care of your patient.     Therapist Signature: Tyree Grimm DPT Date: 2017    Reporting Period: 17 - 17 Time: 4:21 PM     ===========================================================================================  NOTE TO PHYSICIAN:  PLEASE COMPLETE THE ORDERS BELOW AND FAX TO   [facility]: [phone]. If you are unable to process this request in 24 hours please contact our office: [phone].    ___ I have read the above report and request that my patient continue as recommended.   ___ I have read the above report and request that my patient continue therapy with the following changes/special instructions: ________________________________________________   ___ I have read the above report and request that my patient be discharged from therapy.      Physician Signature:        Date:       Time:

## 2017-07-27 NOTE — PROGRESS NOTES
PHYSICAL THERAPY - DAILY TREATMENT NOTE    Patient Name: Gilmer Pineda        Date: 2017  : 1925   YES Patient  Verified  Visit #:   10     Insurance: Payor: Marbella Koo / Plan: [place] PART [place] & B / Product Type: Medicare /      In time: 2:55 Out time: 3:55   Total Treatment Time: 61     Medicare Time Tracking (below)   Total Timed Codes (min):  60 1:1 Treatment Time:  60     TREATMENT AREA =  Right knee pain [M25.561]    SUBJECTIVE    Pain Level (on 0 to 10 scale):  5  / 10   Medication Changes/New allergies or changes in medical history, any new surgeries or procedures? NO     If yes, update Summary List   Subjective Functional Status/Changes:  []  No changes reported     \"My pain isn't really changing. \"          OBJECTIVE    35 min Therapeutic Exercise:  [x]  See flow sheet   Rationale:      increase ROM and increase strength to improve the patients ability to perform ADL's with improved hip girdle stability and knee AROM. 25 min Therapeutic Activity: DGI, step-up's, FOTO administration   Rationale: To increase safety and efficiency with ADL's.     min Patient Education:  YES  Reviewed HEP   []  Progressed/Changed HEP based on:   Patient reports compliance     Other Objective/Functional Measures:    See DC note. Post Treatment Pain Level (on 0 to 10) scale:   5  / 10     ASSESSMENT    Assessment/Changes in Function:     See DC note. []  See Progress Note/Recertification   Patient will continue to benefit from skilled PT services to modify and progress therapeutic interventions, address functional mobility deficits, address ROM deficits, address strength deficits, analyze and address soft tissue restrictions, analyze and cue movement patterns, analyze and modify body mechanics/ergonomics and assess and modify postural abnormalities to attain remaining goals. Progress toward goals / Updated goals: Will reassess NV. WILLOW.     [x]  Upgrade activities as tolerated YES Continue plan of care   []  Discharge due to :    []  Other:      Therapist: Soco Cifuentes    Date: 7/27/2017 Time: 2:55 PM     Future Appointments  Date Time Provider Josephine [place]   7/27/2017 3:00 PM [facility]

## 2017-09-26 NOTE — DISCHARGE INSTRUCTIONS
Urinary Tract Infection in Women: Care Instructions  Your Care Instructions    A urinary tract infection, or UTI, is a general term for an infection anywhere between the kidneys and the urethra (where urine comes out). Most UTIs are bladder infections. They often cause pain or burning when you urinate. UTIs are caused by bacteria and can be cured with antibiotics. Be sure to complete your treatment so that the infection goes away. Follow-up care is a key part of your treatment and safety. Be sure to make and go to all appointments, and call your doctor if you are having problems. It's also a good idea to know your test results and keep a list of the medicines you take. How can you care for yourself at home? · Take your antibiotics as directed. Do not stop taking them just because you feel better. You need to take the full course of antibiotics. · Drink extra water and other fluids for the next day or two. This may help wash out the bacteria that are causing the infection. (If you have kidney, heart, or liver disease and have to limit fluids, talk with your doctor before you increase your fluid intake.)  · Avoid drinks that are carbonated or have caffeine. They can irritate the bladder. · Urinate often. Try to empty your bladder each time. · To relieve pain, take a hot bath or lay a heating pad set on low over your lower belly or genital area. Never go to sleep with a heating pad in place. To prevent UTIs  · Drink plenty of water each day. This helps you urinate often, which clears bacteria from your system. (If you have kidney, heart, or liver disease and have to limit fluids, talk with your doctor before you increase your fluid intake.)  · Urinate when you need to. · Urinate right after you have sex. · Change sanitary pads often. · Avoid douches, bubble baths, feminine hygiene sprays, and other feminine hygiene products that have deodorants.   · After going to the bathroom, wipe from front to back.  When should you call for help? Call your doctor now or seek immediate medical care if:  · Symptoms such as fever, chills, nausea, or vomiting get worse or appear for the first time. · You have new pain in your back just below your rib cage. This is called flank pain. · There is new blood or pus in your urine. · You have any problems with your antibiotic medicine. Watch closely for changes in your health, and be sure to contact your doctor if:  · You are not getting better after taking an antibiotic for 2 days. · Your symptoms go away but then come back. Where can you learn more? Go to http://david-luanne.info/. Enter K075 in the search box to learn more about \"Urinary Tract Infection in Women: Care Instructions. \"  Current as of: November 28, 2016  Content Version: 11.3  © 0231-1688 [employer], [employer]. Care instructions adapted under license by [employer] (which disclaims liability or warranty for this information). If you have questions about a medical condition or this instruction, always ask your healthcare professional. [address] any warranty or liability for your use of this information.

## 2017-09-26 NOTE — ED PROVIDER NOTES
HPI Comments: Yolanda Trevino is a 80 y.o. Female with PMHx of A-Fib who presents to the ED with c/o possible code S. Patient's family perceives slight slurring of words this morning. Daughter notes she took pt to PCP yesterday where she had blood work and received a flu shot. Notes patient got up in the middle of the night last night and had an episode of emesis. Patient reports she is at baseline currently however family states, Verdis Rhein is not 100%. \" Pt c/o dry \"cotton mouth. \" Patient states she has caregivers to take care of her while at home throughout the week. Denies SOB, admits she is on CPAP at home. Denies any pain. Notes her last BM was this morning. Reports compliance with daily dose of Lasix. No other symptoms or concerns were expressed. The history is provided by the patient. Past Medical History:   Diagnosis Date    Atrial fibrillation (HCC)     Cellulitis     Gout     Sleep apnea        Past Surgical History:   Procedure Laterality Date    HX HEART CATHETERIZATION           Family History:   Problem Relation Age of Onset    Breast Cancer Child 39    Breast Cancer Maternal Grandmother        Social History     Social History    Marital status:      Spouse name: N/A    Number of children: N/A    Years of education: N/A     Occupational History    Not on file. Social History Main Topics    Smoking status: Never Smoker    Smokeless tobacco: Never Used    Alcohol use No    Drug use: No    Sexual activity: Not on file     Other Topics Concern    Not on file     Social History Narrative         ALLERGIES: Augmentin [amoxicillin-pot clavulanate]; Biaxin [clarithromycin]; Clindamycin; Codeine; Doxycycline; Procardia [nifedipine]; and Trimox [amoxicillin]    Review of Systems   Constitutional: Negative for diaphoresis and fever. HENT: Negative for congestion and sore throat. Eyes: Negative for pain and itching. Respiratory: Negative for cough and shortness of breath. Cardiovascular: Negative for chest pain and palpitations. Gastrointestinal: Positive for vomiting. Negative for abdominal pain and diarrhea. Endocrine: Negative for polydipsia and polyuria. Genitourinary: Negative for dysuria and hematuria. Musculoskeletal: Negative for arthralgias and myalgias. Skin: Negative for rash and wound. Neurological: Positive for speech difficulty. Negative for seizures and syncope. Hematological: Does not bruise/bleed easily. Psychiatric/Behavioral: Negative for agitation and hallucinations. Vitals:    09/26/17 1024   BP: 118/71   Pulse: (!) 107   Resp: 16   Temp: 99.3 °F (37.4 °C)   SpO2: 93%   Weight: 59.9 kg (132 lb)   Height: 4' 11\" (1.499 m)            Physical Exam   Constitutional: She appears well-developed and well-nourished. HENT:   Head: Normocephalic and atraumatic. Eyes: Conjunctivae and EOM are normal. Pupils are equal, round, and reactive to light. No scleral icterus. Neck: Normal range of motion. Neck supple. No JVD present. Cardiovascular: Normal rate, regular rhythm and normal heart sounds. 4 intact extremity pulses   Pulmonary/Chest: Effort normal and breath sounds normal.   Abdominal: Soft. She exhibits no mass. There is no tenderness. Musculoskeletal: Normal range of motion. Lymphadenopathy:     She has no cervical adenopathy. Neurological: She is alert. No cranial nerve deficit or sensory deficit. CN intact  Normal sensation and strength   Skin: Skin is warm and dry. Nursing note and vitals reviewed. MDM  Number of Diagnoses or Management Options  Diagnosis management comments: Code S was called pt seen by Dr. Apurva Gregorio, discussed with me, does not feel it was consistent with stroke. Will evaluate for hypoglycemia, hyponatremia, SEJAL, UTI.     ED Course       Procedures       Vitals:  Patient Vitals for the past 12 hrs:   Temp Pulse Resp BP SpO2   09/26/17 1024 99.3 °F (37.4 °C) (!) 107 16 118/71 93 %       Medications Ordered:  Medications   sodium chloride 0.9 % bolus infusion 500 mL (500 mL IntraVENous New Bag 9/26/17 1422)   acetaminophen (TYLENOL) tablet 650 mg (650 mg Oral Given 9/26/17 1431)       Lab Findings:  Recent Results (from the past 12 hour(s))   CBC W/O DIFF    Collection Time: 09/26/17 11:00 AM   Result Value Ref Range    WBC 5.3 4.6 - 13.2 K/uL    RBC 3.79 (L) 4.20 - 5.30 M/uL    HGB 11.6 (L) 12.0 - 16.0 g/dL    HCT 34.3 (L) 35.0 - 45.0 %    MCV 90.5 74.0 - 97.0 FL    MCH 30.6 24.0 - 34.0 PG    MCHC 33.8 31.0 - 37.0 g/dL    RDW 14.8 (H) 11.6 - 14.5 %    PLATELET 95 (L) 993 - 420 K/uL    MPV 13.4 (H) 9.2 - 32.4 FL   METABOLIC PANEL, COMPREHENSIVE    Collection Time: 09/26/17 11:00 AM   Result Value Ref Range    Sodium 139 136 - 145 mmol/L    Potassium 3.9 3.5 - 5.5 mmol/L    Chloride 102 100 - 108 mmol/L    CO2 26 21 - 32 mmol/L    Anion gap 11 3.0 - 18 mmol/L    Glucose 123 (H) 74 - 99 mg/dL    BUN 66 (H) 7.0 - 18 MG/DL    Creatinine 1.92 (H) 0.6 - 1.3 MG/DL    BUN/Creatinine ratio 34 (H) 12 - 20      GFR est AA 30 (L) >60 ml/min/1.73m2    GFR est non-AA 24 (L) >60 ml/min/1.73m2    Calcium 9.6 8.5 - 10.1 MG/DL    Bilirubin, total 0.9 0.2 - 1.0 MG/DL    ALT (SGPT) 25 13 - 56 U/L    AST (SGOT) 46 (H) 15 - 37 U/L    Alk.  phosphatase 172 (H) 45 - 117 U/L    Protein, total 6.1 (L) 6.4 - 8.2 g/dL    Albumin 3.5 3.4 - 5.0 g/dL    Globulin 2.6 2.0 - 4.0 g/dL    A-G Ratio 1.3 0.8 - 1.7     CARDIAC PANEL,(CK, CKMB & TROPONIN)    Collection Time: 09/26/17 11:00 AM   Result Value Ref Range    CK 24 (L) 26 - 192 U/L    CK - MB <1.0 <3.6 ng/ml    CK-MB Index  0.0 - 4.0 %     CALCULATION NOT PERFORMED WHEN RESULT IS BELOW LINEAR LIMIT    Troponin-I, Qt. 0.06 (H) 0.0 - 0.045 NG/ML   URINALYSIS W/ RFLX MICROSCOPIC    Collection Time: 09/26/17  2:10 PM   Result Value Ref Range    Color YELLOW      Appearance CLEAR      Specific gravity 1.016 1.005 - 1.030      pH (UA) 5.0 5.0 - 8.0      Protein NEGATIVE  NEG mg/dL    Glucose NEGATIVE  NEG mg/dL    Ketone NEGATIVE  NEG mg/dL    Bilirubin NEGATIVE  NEG      Blood TRACE (A) NEG      Urobilinogen 0.2 0.2 - 1.0 EU/dL    Nitrites NEGATIVE  NEG      Leukocyte Esterase LARGE (A) NEG     URINE MICROSCOPIC ONLY    Collection Time: 09/26/17  2:10 PM   Result Value Ref Range    WBC TOO NUMEROUS TO COUNT 0 - 4 /hpf    RBC 0 0 - 5 /hpf    Epithelial cells FEW 0 - 5 /lpf    Bacteria 1+ (A) NEG /hpf   TROPONIN I    Collection Time: 09/26/17  2:14 PM   Result Value Ref Range    Troponin-I, Qt. 0.07 (H) 0.0 - 0.045 NG/ML     X-ray, CT or radiology findings or impressions:  XR CHEST PORT   Final Result      CT HEAD WO CONT   Final Result      CT Head:  1. Parenchymal volume loss/atrophy. Age-indeterminate, punctate right  caudate/internal capsule lacunar infarct, which should be clinically correlated  for significance. White matter findings nonspecific but typically reflecting  chronic ischemic change in a patient of this age.      2. Bifrontal subdural hygromas.     3. No CT evidence to suggest acute intracranial hematoma, cortical infarct, or  mass effect/mass lesion. CXR:  Cardiomegaly, unchanged from 11/2016. Possible small R sided pleural effusion. Progress notes, consult notes, or additional procedure notes:  10:50 AM: discussed with radiology, R lacunar infarct, otherwise no acute findings. Code S canceled by Dr. Dari Villalba, teleneurologist.    Discussed labs, uti, abx, small pleural effusion, discussed with family. Questions answered and happy with the plan. No speech abnormality. Diagnosis:   1. Urinary tract infection without hematuria, site unspecified    2. Pleural effusion        Disposition: Discharge.     Follow-up Information     Follow up With Details Comments Maykel Sheridan MD In 2 days  [address] [facility] [address] [place] [address]  [phone]             Patient's Medications   Start Taking    NITROFURANTOIN, MACROCRYSTAL-MONOHYDRATE, (MACROBID) 100 MG CAPSULE    Take 1 Cap by mouth two (2) times a day for 3 days. Continue Taking    ALLOPURINOL (ZYLOPRIM) 100 MG TABLET    Take 100 mg by mouth daily. COLCHICINE 0.6 MG TABLET    Take 0.3 mg by mouth daily. DIGOXIN (LANOXIN) 0.125 MG TABLET    Take 0.0625 mg by mouth daily. DILTIAZEM XR (DILACOR XR) 120 MG XR CAPSULE    Take  by mouth daily. ERGOCALCIFEROL (ERGOCALCIFEROL) 50,000 UNIT CAPSULE    Take 400 Units by mouth daily. FEBUXOSTAT (ULORIC) 40 MG TAB TABLET    Take 40 mg by mouth daily. FLAXSEED OIL OIL    by Does Not Apply route. FUROSEMIDE (LASIX) 40 MG TABLET    Take 40 mg by mouth daily. LEVOTHYROXINE (SYNTHROID) 88 MCG TABLET    Take 88 mcg by mouth Daily (before breakfast). METOPROLOL TARTRATE (LOPRESSOR) 50 MG TABLET    Take 100 mg by mouth two (2) times a day. MULTIVITAMIN, TX-IRON-CA-MIN (THERA-M W/ IRON) 9 MG IRON-400 MCG TAB TABLET    Take 1 Tab by mouth daily. POTASSIUM CITRATE 15 MEQ TBER    Take  by mouth. RIVAROXABAN (XARELTO) 15 MG TAB TABLET    Take 15 mg by mouth daily (with breakfast). SIMVASTATIN (ZOCOR) 20 MG TABLET    Take 20 mg by mouth nightly. TELMISARTAN (MICARDIS) 80 MG TABLET    Take 80 mg by mouth daily. TRAMADOL (ULTRAM) 50 MG TABLET    Take 1 Tab by mouth every six (6) hours as needed. Max Daily Amount: 200 mg. These Medications have changed    No medications on file   Stop Taking    No medications on file       Scribe Attestation      Branden Saldaña acting as a scribe for and in the presence of Radha Russell MD      September 26, 2017 at 10:52 AM       Provider Attestation:      I personally performed the services described in the documentation, reviewed the documentation, as recorded by the scribe in my presence, and it accurately and completely records my words and actions.  September 26, 2017 at 10:52 AM - Radha Russell MD

## 2017-09-26 NOTE — ED TRIAGE NOTES
Patient arrived by EMS with chief c/o nausea and vomiting after receiving flu shot yesterday. EMS states that family said patient had a brief period of difficulty speaking this morning. Patient states \"i just have dry mouth\".  Patient will be moved from [place] [address] room 2 to Main Side Bed 4 at6 this time to have Dr. Deedee Liu assess patient for possible Stroke

## 2017-11-28 PROBLEM — R19.7 DIARRHEA: Status: ACTIVE | Noted: 2017-01-01

## 2017-11-28 PROBLEM — N17.9 ACUTE RENAL FAILURE SUPERIMPOSED ON STAGE 3 CHRONIC KIDNEY DISEASE (HCC): Status: ACTIVE | Noted: 2017-01-01

## 2017-11-28 PROBLEM — I48.91 ATRIAL FIBRILLATION (HCC): Status: ACTIVE | Noted: 2017-01-01

## 2017-11-28 PROBLEM — M10.9 GOUT: Status: ACTIVE | Noted: 2017-01-01

## 2017-11-28 PROBLEM — N18.30 ACUTE RENAL FAILURE SUPERIMPOSED ON STAGE 3 CHRONIC KIDNEY DISEASE (HCC): Status: ACTIVE | Noted: 2017-01-01

## 2017-11-28 PROBLEM — I87.2 CHRONIC VENOUS STASIS DERMATITIS: Status: ACTIVE | Noted: 2017-01-01

## 2017-11-28 NOTE — ED NOTES
Kacy Hernandez  [facility] EMERGENCY DEPT      80 y.o. female with noted past medical history who presents to the emergency department after being sent here by her pcp. PCP called and states she is having acute on chronic renal insufficiency. Patient admits to decreased urination. I performed a brief evaluation, including history and physical, of the patient here in triage and I have determined that pt will need further treatment and evaluation from the main side ER physician. I have placed initial orders to help in expediting patients care. Des Oquendo M.D.

## 2017-11-28 NOTE — ED NOTES
Pt oxygen saturation noted to dip into mid to high 80s on room air. Dr. Tonja Bang and Dr. Mustafa Pac aware. Pt oxygen will increase when encouraged to take deep breaths.

## 2017-11-28 NOTE — H&P
Internal Medicine History and Physical          Subjective     HPI: Yolanda Chappell is a 80 y.o. female with a PMHx of Afib, venous stasis disease, gout, ckd III who presented to the ED with 2-3 week history of fatigue, weakness, poor appetite and weight gain. She was sent here from her [de-identified] office after regular check up because of abnormal labs. She admits to persistent diarrhea as well for the past 2 weeks. She states she was placed on an antibx by per PCP for lower ext cellulitis about 3 weeks ago, but can't remember which one and was on it for 2 weeks, finishing up last week. She states the diarrhea began while she was on the antibx. The diarrhea is not voluminous. It is smelly per daughter. It occurs throughout the day and sometimes she can't make it to the bathroom. She denies a history of cdiff. She states increased edema in her legs, leading her PCP to increase her daily lasix. She denies a history of heart failure. Her PCP has been managing her lower ext edema and blisters with local wound care. She admits to over a 15lb weight gain in the past several weeks despite a decreased appetite. She states she feels dehydrated. In the ED, she was given one small 250cc bolus of IVFs after being found to be in acute renal failure. PMHx:  Past Medical History:   Diagnosis Date    Atrial fibrillation ([address]     Cellulitis     Gout     Sleep apnea        PSurgHx:  Past Surgical History:   Procedure Laterality Date    HX HEART CATHETERIZATION         SocialHx:  Social History     Social History    Marital status:      Spouse name: N/A    Number of children: N/A    Years of education: N/A     Occupational History    Not on file.      Social History Main Topics    Smoking status: Never Smoker    Smokeless tobacco: Never Used    Alcohol use No    Drug use: No    Sexual activity: Not on file     Other Topics Concern    Not on file     Social History Narrative       FamilyHx:  Family History Problem Relation Age of Onset    Breast Cancer Child 39    Breast Cancer Maternal Grandmother        Prior to Admission Medications   Prescriptions Last Dose Informant Patient Reported? Taking? Flaxseed Oil oil 11/28/2017 at Unknown time  Yes Yes   Sig: by Does Not Apply route. Potassium Citrate 15 mEq TbER 11/28/2017 at Unknown time  Yes Yes   Sig: Take  by mouth.   colchicine 0.6 mg tablet 11/28/2017 at Unknown time  Yes Yes   Sig: Take 0.3 mg by mouth daily. digoxin (LANOXIN) 0.125 mg tablet 11/28/2017 at Unknown time  Yes Yes   Sig: Take 0.0625 mg by mouth daily. dilTIAZem XR (DILACOR XR) 120 mg XR capsule 11/28/2017 at Unknown time  Yes Yes   Sig: Take  by mouth daily. ergocalciferol (ERGOCALCIFEROL) 50,000 unit capsule 11/28/2017 at Unknown time  Yes Yes   Sig: Take 400 Units by mouth daily. febuxostat (ULORIC) 40 mg tab tablet 11/28/2017 at Unknown time  Yes Yes   Sig: Take 40 mg by mouth daily. furosemide (LASIX) 40 mg tablet 11/28/2017 at Unknown time  Yes Yes   Sig: Take 40 mg by mouth daily. levothyroxine (SYNTHROID) 88 mcg tablet 11/28/2017 at Unknown time  Yes Yes   Sig: Take 88 mcg by mouth Daily (before breakfast). metoprolol tartrate (LOPRESSOR) 50 mg tablet 11/28/2017 at Unknown time  Yes Yes   Sig: Take 100 mg by mouth two (2) times a day. multivitamin, tx-iron-ca-min (THERA-M W/ IRON) 9 mg iron-400 mcg tab tablet 11/28/2017 at Unknown time  Yes Yes   Sig: Take 1 Tab by mouth daily. rivaroxaban (XARELTO) 15 mg tab tablet 11/28/2017 at Unknown time  Yes Yes   Sig: Take 15 mg by mouth daily (with breakfast). simvastatin (ZOCOR) 20 mg tablet 11/28/2017 at Unknown time  Yes Yes   Sig: Take 20 mg by mouth nightly. telmisartan (MICARDIS) 80 mg tablet 11/28/2017 at Unknown time  Yes Yes   Sig: Take 80 mg by mouth daily. traMADol (ULTRAM) 50 mg tablet 11/28/2017 at Unknown time  No Yes   Sig: Take 1 Tab by mouth every six (6) hours as needed. Max Daily Amount: 200 mg. Facility-Administered Medications: None       Review of Systems:  CONST: Fever, weight gain, fatigue or chills, decreased appetite  HEENT: Recent changes in vision, vertigo, epistaxis, dysphagia and hoarseness  CV: Chest pain, palpitations, HTN, edema and varicosities  RESP: Cough, shortness of breath, wheezing, hemoptysis, snoring and reactive airway disease  GI: Nausea, vomiting, abdominal pain, change in bowel habits, diarrhea, hematochezia, melena, and GERD   : Hematuria, dysuria, decreased frequency, urgency, nocturia and stress urinary incontinence   MS: Weakness, joint pain and arthritis  ENDO: Diabetes, thyroid disease, polyuria, polydipsia, polyphagia, poor wound healing, heat intolerance, cold intolerance  LYMPH/HEME: Anemia, bruising and history of blood transfusions  INTEG: Dermatitis, abnormal moles  NEURO: Dizziness, headache, fainting, seizures and stroke  PSYCH: Anxiety and depression      Objective      Visit Vitals    /60    Pulse (!) 57    Temp 97.2 °F (36.2 °C)    Resp 16    Ht 5' (1.524 m)    Wt 67.1 kg (148 lb)    SpO2 97%    BMI 28.9 kg/m2       Physical Exam:  General Appearance: NAD, conversant  HENT: normocephalic/atraumatic, dry mucus membranes  Lungs: CTA with normal respiratory effort  Cardiovascular: IRIR, no m/r/g, capillary refill < 2 sec, B/L DP/PT pulses +3/4  Abdomen: soft, non-tender, protuberant, normal bowel sounds  Extremities: no cyanosis, +1 B/L LE pitting edema L>R, ulcer L lateral shin, + blister RLE medial aspect  Neuro: moves all extremities, no focal deficits  Psych: appropriate affect, alert and oriented to person, place and time    Laboratory Studies:  BMP:   Lab Results   Component Value Date/Time     11/28/2017 04:35 PM    K 5.1 11/28/2017 04:35 PM     11/28/2017 04:35 PM    CO2 16 (L) 11/28/2017 04:35 PM    AGAP 15 11/28/2017 04:35 PM    GLU 68 (L) 11/28/2017 04:35 PM     (H) 11/28/2017 04:35 PM    CREA 4.16 (H) 11/28/2017 04:35 PM    GFRAA 12 (L) 11/28/2017 04:35 PM    GFRNA 10 (L) 11/28/2017 04:35 PM     CBC:   Lab Results   Component Value Date/Time    WBC 3.3 (L) 11/28/2017 04:35 PM    HGB 10.6 (L) 11/28/2017 04:35 PM    HCT 31.7 (L) 11/28/2017 04:35 PM    PLT 98 (L) 11/28/2017 04:35 PM       Imaging Reviewed:  No results found. EKG:   Atrial fibrillation with slow ventricular response   Left axis deviation   Incomplete right bundle branch block   Abnormal ECG   When compared with ECG of 09-JUL-2017 19:42,   QT has lengthened     Assessment/Plan     Active Hospital Problems    Diagnosis Date Noted    Acute renal failure superimposed on stage 3 chronic kidney disease ([facility] [address] 11/28/2017    Atrial fibrillation ([facility] [address] 11/28/2017    Gout 11/28/2017    Chronic venous stasis dermatitis 11/28/2017    Diarrhea 11/28/2017     - Cont IVFs, slower rate given age  - Nephro consulted in ED  - Check urine lytes  - PVR  - Renal US  - Hold nephrotoxins  - Trend BMP  - Check stool culture, CDiff given history of 2 weeks of antibx, although pt w/o leukocytosis  - Isolation until then  - Observe off antibx for now  - Wound care consult  - Cont acceptable home medications for chronic conditions   - DVT protocol    I have personally reviewed all pertinent labs, films and EKGs that have officially resulted. I reviewed available electronic documentation outlining the initial presentation as well as the emergency room physician's encounter.     Alpa Munguia, DO  Internal Medicine, Hospitalist  Pager: [phone] [facility]

## 2017-11-28 NOTE — ED PROVIDER NOTES
EMERGENCY DEPARTMENT HISTORY AND PHYSICAL EXAM    4:30 PM      Date: 11/28/2017  Patient Name: Diana Mckenzie    History of Presenting Illness     Chief Complaint   Patient presents with    Abnormal Lab Results         History Provided By: Patient and Patient's Daughter    Chief Complaint: dehydration   Duration: 1 Days  Timing:  N/A  Location: [facility]   Quality: N/A  Severity: Moderate  Modifying Factors: none  Associated Symptoms: fatigue      Additional History (Context): Yolanda Small is a 80 y.o. female with gout, a-fib, sleep apnea and cellulitis who presents to the ED with a chief complaint of dehydration since yesterday with associated symptoms of generalized fatigue. Pt states she was at her doctor's office and was called to notify that she had abnormal kidney lab values and was instructed to visit the ED to get fluids. Pt does not state any alleviating or precipitating factors for her tiredness. Pt reports that she currently uses topical ABx for chronic blisters on bilateral legs. Pt also reports she has cellulitis and was given shots in the past, but not anymore. Pt's daughter present bedside states that the patient takes synthroid and xarelto. Daughter also reports that the patient had an episode of diarrhea last night. Patient denies any fever, chills, cough, chest pain, SOB, abdominal pain, N/V, headache, dysuria, diaphoresis, syncope or any other symptoms or complaints. PCP: Leonora Ontiveros MD    Current Facility-Administered Medications   Medication Dose Route Frequency Provider Last Rate Last Dose    levoFLOXacin (LEVAQUIN) 750 mg in D5W IVPB  750 mg IntraVENous NOW Jasper Boston MD         Current Outpatient Prescriptions   Medication Sig Dispense Refill    Potassium Citrate 15 mEq TbER Take  by mouth.  febuxostat (ULORIC) 40 mg tab tablet Take 40 mg by mouth daily.  traMADol (ULTRAM) 50 mg tablet Take 1 Tab by mouth every six (6) hours as needed.  Max Daily Amount: 200 mg. 10 Tab 0  dilTIAZem XR (DILACOR XR) 120 mg XR capsule Take  by mouth daily.  levothyroxine (SYNTHROID) 88 mcg tablet Take 88 mcg by mouth Daily (before breakfast).  simvastatin (ZOCOR) 20 mg tablet Take 20 mg by mouth nightly.  telmisartan (MICARDIS) 80 mg tablet Take 80 mg by mouth daily.  rivaroxaban (XARELTO) 15 mg tab tablet Take 15 mg by mouth daily (with breakfast).  furosemide (LASIX) 40 mg tablet Take 40 mg by mouth daily.  ergocalciferol (ERGOCALCIFEROL) 50,000 unit capsule Take 400 Units by mouth daily.  multivitamin, tx-iron-ca-min (THERA-M W/ IRON) 9 mg iron-400 mcg tab tablet Take 1 Tab by mouth daily.  Flaxseed Oil oil by Does Not Apply route.  metoprolol tartrate (LOPRESSOR) 50 mg tablet Take 100 mg by mouth two (2) times a day.  digoxin (LANOXIN) 0.125 mg tablet Take 0.0625 mg by mouth daily.  colchicine 0.6 mg tablet Take 0.3 mg by mouth daily. Past History     Past Medical History:  Past Medical History:   Diagnosis Date    Atrial fibrillation ([address]     Cellulitis     Gout     Sleep apnea        Past Surgical History:  Past Surgical History:   Procedure Laterality Date    HX HEART CATHETERIZATION         Family History:  Family History   Problem Relation Age of Onset    Breast Cancer Child 39    Breast Cancer Maternal Grandmother        Social History:  Social History   Substance Use Topics    Smoking status: Never Smoker    Smokeless tobacco: Never Used    Alcohol use No       Allergies: Allergies   Allergen Reactions    Augmentin [Amoxicillin-Pot Clavulanate] Unknown (comments)    Biaxin [Clarithromycin] Unknown (comments)    Clindamycin Rash    Codeine Unknown (comments)    Doxycycline Diarrhea    Procardia [Nifedipine] Unknown (comments)    Trimox [Amoxicillin] Unknown (comments)         Review of Systems       Review of Systems   Constitutional: Positive for fatigue. Negative for activity change and fever.    HENT: Negative for congestion and rhinorrhea. Dehydrated   Eyes: Negative for visual disturbance. Respiratory: Negative for shortness of breath. Cardiovascular: Negative for chest pain and palpitations. Gastrointestinal: Positive for diarrhea. Negative for abdominal pain, nausea and vomiting. Genitourinary: Negative for dysuria and hematuria. Musculoskeletal: Negative for back pain. Skin: Negative for rash. Neurological: Positive for weakness (generalized). Negative for dizziness and light-headedness. Psychiatric/Behavioral: Negative for agitation. All other systems reviewed and are negative. Physical Exam     Visit Vitals    /60    Pulse (!) 57    Temp 97.2 °F (36.2 °C)    Resp 16    Ht 5' (1.524 m)    Wt 67.1 kg (148 lb)    SpO2 97%    BMI 28.9 kg/m2         Physical Exam   Constitutional: She appears well-developed and well-nourished. No distress. HENT:   Head: Normocephalic and atraumatic. Right Ear: External ear normal.   Left Ear: External ear normal.   Nose: Nose normal.   Mouth/Throat: Oropharynx is clear and moist.   Eyes: Conjunctivae and EOM are normal. Pupils are equal, round, and reactive to light. No scleral icterus. Neck: Normal range of motion. Neck supple. No JVD present. No tracheal deviation present. No thyromegaly present. Cardiovascular: An irregular rhythm present. Bradycardia present. Exam reveals no friction rub. No murmur heard. Pulmonary/Chest: Effort normal and breath sounds normal. No stridor. She exhibits no tenderness. Abdominal: Soft. Bowel sounds are normal. She exhibits no distension. There is no tenderness. There is no rebound and no guarding. Musculoskeletal: Normal range of motion. She exhibits no edema or tenderness. 1+ edema bilateral lower extremities. Lymphadenopathy:     She has no cervical adenopathy. Neurological: She is alert. No cranial nerve deficit. Coordination normal.   Skin: Skin is warm and dry. -No obvious sign of cellulitis. -Healing blisters on left medial lower leg.   -Right lower leg extremity blisters filled with erythema, but no sign of infection.   -Distal feet are warm. -Bilateral lower extremity doppler signals were present.  -Normal capillary refill. Psychiatric: She has a normal mood and affect. Her behavior is normal. Judgment and thought content normal.   Nursing note and vitals reviewed.         Diagnostic Study Results     Labs -  Recent Results (from the past 12 hour(s))   EKG, 12 LEAD, INITIAL    Collection Time: 11/28/17  4:25 PM   Result Value Ref Range    Ventricular Rate 52 BPM    Atrial Rate 227 BPM    QRS Duration 118 ms    Q-T Interval 490 ms    QTC Calculation (Bezet) 455 ms    Calculated R Axis -38 degrees    Calculated T Axis 26 degrees    Diagnosis       Atrial fibrillation with slow ventricular response  Left axis deviation  Incomplete right bundle branch block  Abnormal ECG  When compared with ECG of 09-JUL-2017 19:42,  QT has lengthened     METABOLIC PANEL, BASIC    Collection Time: 11/28/17  4:35 PM   Result Value Ref Range    Sodium 137 136 - 145 mmol/L    Potassium 5.1 3.5 - 5.5 mmol/L    Chloride 106 100 - 108 mmol/L    CO2 16 (L) 21 - 32 mmol/L    Anion gap 15 3.0 - 18 mmol/L    Glucose 68 (L) 74 - 99 mg/dL     (H) 7.0 - 18 MG/DL    Creatinine 4.16 (H) 0.6 - 1.3 MG/DL    BUN/Creatinine ratio 31 (H) 12 - 20      GFR est AA 12 (L) >60 ml/min/1.73m2    GFR est non-AA 10 (L) >60 ml/min/1.73m2    Calcium 9.5 8.5 - 10.1 MG/DL   MAGNESIUM    Collection Time: 11/28/17  4:35 PM   Result Value Ref Range    Magnesium 2.4 1.6 - 2.6 mg/dL   CBC WITH AUTOMATED DIFF    Collection Time: 11/28/17  4:35 PM   Result Value Ref Range    WBC 3.3 (L) 4.6 - 13.2 K/uL    RBC 3.44 (L) 4.20 - 5.30 M/uL    HGB 10.6 (L) 12.0 - 16.0 g/dL    HCT 31.7 (L) 35.0 - 45.0 %    MCV 92.2 74.0 - 97.0 FL    MCH 30.8 24.0 - 34.0 PG    MCHC 33.4 31.0 - 37.0 g/dL    RDW 19.9 (H) 11.6 - 14.5 % PLATELET 98 (L) 554 - 420 K/uL    MPV 12.9 (H) 9.2 - 11.8 FL    NEUTROPHILS 59 40 - 73 %    LYMPHOCYTES 15 (L) 21 - 52 %    MONOCYTES 11 (H) 3 - 10 %    EOSINOPHILS 14 (H) 0 - 5 %    BASOPHILS 1 0 - 2 %    ABS. NEUTROPHILS 2.0 1.8 - 8.0 K/UL    ABS. LYMPHOCYTES 0.5 (L) 0.9 - 3.6 K/UL    ABS. MONOCYTES 0.4 0.05 - 1.2 K/UL    ABS. EOSINOPHILS 0.5 (H) 0.0 - 0.4 K/UL    ABS. BASOPHILS 0.0 0.0 - 0.06 K/UL    DF AUTOMATED     CARDIAC PANEL,(CK, CKMB & TROPONIN)    Collection Time: 11/28/17  4:35 PM   Result Value Ref Range    CK 66 26 - 192 U/L    CK - MB 3.9 (H) <3.6 ng/ml    CK-MB Index 5.9 (H) 0.0 - 4.0 %    Troponin-I, Qt. 0.03 0.0 - 0.045 NG/ML   TSH 3RD GENERATION    Collection Time: 11/28/17  4:35 PM   Result Value Ref Range    TSH 9.98 (H) 0.36 - 3.74 uIU/mL   POC LACTIC ACID    Collection Time: 11/28/17  4:40 PM   Result Value Ref Range    Lactic Acid (POC) 1.4 0.4 - 2.0 mmol/L   URINALYSIS W/ RFLX MICROSCOPIC    Collection Time: 11/28/17  4:45 PM   Result Value Ref Range    Color YELLOW      Appearance TURBID      Specific gravity 1.018 1.005 - 1.030      pH (UA) 5.0 5.0 - 8.0      Protein 100 (A) NEG mg/dL    Glucose NEGATIVE  NEG mg/dL    Ketone NEGATIVE  NEG mg/dL    Bilirubin NEGATIVE  NEG      Blood LARGE (A) NEG      Urobilinogen 0.2 0.2 - 1.0 EU/dL    Nitrites NEGATIVE  NEG      Leukocyte Esterase LARGE (A) NEG     URINE MICROSCOPIC ONLY    Collection Time: 11/28/17  4:45 PM   Result Value Ref Range    WBC TOO NUMEROUS TO COUNT 0 - 4 /hpf    RBC TOO NUMEROUS TO COUNT 0 - 5 /hpf    Epithelial cells FEW 0 - 5 /lpf    Bacteria 3+ (A) NEG /hpf       Radiologic Studies -   No orders to display         Medical Decision Making   Provider Notes (Medical Decision Making): Yolanda Edwards is a 80 y.o. female who presents to the ED has dehydration, chronic leg swelling and generalized fatigue. I will need to rule out UTI/thyroid disease. Patient's EKG is non-specific and troponin is not elevated.  Patient states she does not have SOB or CP. From today's labs, patient has BUN of 129 and creatinine of 4.16 which are twice the values from SEP-2017 lab results. Pt did have an UTI and so I will check UA for UTI. I will also check thyroid function and start hydration on patient. I will request the patient to be admitted for gentle hydration. I will also communicate this with her PCP. I am the first provider for this patient. I reviewed the vital signs, available nursing notes, past medical history, past surgical history, family history and social history. Vital Signs-Reviewed the patient's vital signs. EKG:  Rhythm: Irregular with A-Fib. Rate: 52 bpm  Interpretation: left axis, normal intervals. Compared to [facility], no significant changes other than RBBB. EKG interpret by Rebeca Mcgraw MD 4:28 PM      Records Reviewed: Nursing Notes, Old Medical Records and Previous electrocardiograms (Time of Review: 4:30 PM)    ED Course: Progress Notes, Reevaluation, and Consults:  5:39 PM  I spoke to patient's PCP and she requests a nephrology consult and wants the patient to stay for acute kidney injury. Case discuss with Dr. Melanie Arias and he will see patient in consultation    Diagnosis     Clinical Impression:   1. SEJAL (acute kidney injury) ([facility]    2. Dehydration    3. Leg edema    4. Diarrhea, unspecified type    5. Urinary tract infection with hematuria, site unspecified        Disposition: Admit    Follow-up Information     None           Patient's Medications   Start Taking    No medications on file   Continue Taking    COLCHICINE 0.6 MG TABLET    Take 0.3 mg by mouth daily. DIGOXIN (LANOXIN) 0.125 MG TABLET    Take 0.0625 mg by mouth daily. DILTIAZEM XR (DILACOR XR) 120 MG XR CAPSULE    Take  by mouth daily. ERGOCALCIFEROL (ERGOCALCIFEROL) 50,000 UNIT CAPSULE    Take 400 Units by mouth daily. FEBUXOSTAT (ULORIC) 40 MG TAB TABLET    Take 40 mg by mouth daily. FLAXSEED OIL OIL    by Does Not Apply route. FUROSEMIDE (LASIX) 40 MG TABLET    Take 40 mg by mouth daily. LEVOTHYROXINE (SYNTHROID) 88 MCG TABLET    Take 88 mcg by mouth Daily (before breakfast). METOPROLOL TARTRATE (LOPRESSOR) 50 MG TABLET    Take 100 mg by mouth two (2) times a day. MULTIVITAMIN, TX-IRON-CA-MIN (THERA-M W/ IRON) 9 MG IRON-400 MCG TAB TABLET    Take 1 Tab by mouth daily. POTASSIUM CITRATE 15 MEQ TBER    Take  by mouth. RIVAROXABAN (XARELTO) 15 MG TAB TABLET    Take 15 mg by mouth daily (with breakfast). SIMVASTATIN (ZOCOR) 20 MG TABLET    Take 20 mg by mouth nightly. TELMISARTAN (MICARDIS) 80 MG TABLET    Take 80 mg by mouth daily. TRAMADOL (ULTRAM) 50 MG TABLET    Take 1 Tab by mouth every six (6) hours as needed. Max Daily Amount: 200 mg. These Medications have changed    No medications on file   Stop Taking    ALLOPURINOL (ZYLOPRIM) 100 MG TABLET    Take 100 mg by mouth daily. _______________________________    Attestations:  Scribe Attestation     Jaqueline Alcocer acting as a scribe for and in the presence of Domo Kirk MD      November 28, 2017 at 4:30 PM       Provider Attestation:      I personally performed the services described in the documentation, reviewed the documentation, as recorded by the scribe in my presence, and it accurately and completely records my words and actions.  November 28, 2017 at 4:30 PM - Domo Kirk MD    _______________________________

## 2017-11-29 PROBLEM — N18.9 ACUTE ON CHRONIC RENAL FAILURE (HCC): Status: ACTIVE | Noted: 2017-01-01

## 2017-11-29 PROBLEM — N17.9 ACUTE ON CHRONIC RENAL FAILURE (HCC): Status: ACTIVE | Noted: 2017-01-01

## 2017-11-29 PROBLEM — N39.0 UTI (URINARY TRACT INFECTION): Status: ACTIVE | Noted: 2017-01-01

## 2017-11-29 NOTE — PHYSICIAN ADVISORY
Letter of Determination: Upgrade from Observation to Inpatient Status    Yolanda Camacho was hospitalized as observation status on 11/28/2017. Her hospital stay has already crossed one medically necessary midnight for management of acute renal failure requiring IV hydration. This patient with multiple comorbidities and high risk of of poor outcome is reasonably expected to require medically necessary hospital care spanning at least two midnights, based on concern for renal failure and sepsis, requiring IV fluids, further imaging, nephrology evaluation, and close clinical and laboratory monitoring. Since the patient is expected to need at least two midnights of medically necessary hospital care, Inpatient Admission status is appropriate in accordance with CMS regulation Section 43 .3. Based on the documented clinical condition and care plan, we recommend upgrading patient's hospitalization status from OBSERVATION to INPATIENT status. The final decision regarding the patient's hospitalization status depends on the attending physician's clinical judgment.        Velta Ganser, MD, ROB, FACP, [facility]  Physician [place].    November 29, 2017   11:45 AM

## 2017-11-29 NOTE — PROGRESS NOTES
Patient and/or next of kin has been given and has signed the [facility] Outpatient Observation  Notification letter and all questions answered. Copy of this notice given to patient and copy placed on chart. Patient and/or next of kin has been given the Outpatient Observation Information and Notification letter and all questions answered.

## 2017-11-29 NOTE — ED NOTES
TRANSFER - OUT REPORT:    Verbal report given to Temo George RN(name) on June Braun  being transferred to 2108(unit) for routine progression of care       Report consisted of patients Situation, Background, Assessment and   Recommendations(SBAR). Information from the following report(s) SBAR, ED Summary, Intake/Output, MAR, Recent Results and Med Rec Status was reviewed with the receiving nurse. Lines:       Opportunity for questions and clarification was provided.       Patient transported with:   Dibsie

## 2017-11-29 NOTE — PROGRESS NOTES
Problem: Falls - Risk of  Goal: *Absence of Falls  Document Cornell Fall Risk and appropriate interventions in the flowsheet.    Outcome: Progressing Towards Goal  Fall Risk Interventions:            Medication Interventions: Patient to call before getting OOB    Elimination Interventions: Patient to call for help with toileting needs

## 2017-11-29 NOTE — PROGRESS NOTES
2015: Assumed patient care. Patient transferred from ED via stretcher. Patient's daughter at bedside, patient verbalized permission to discuss health care information and medications with daughter present. Patient states she is in mild pain at this time and is in chronic pain with a constant level of 7/10 in back and legs. 2020: Performed complex assessment    2025: Daughter informed me that patient is allergic to Bacitracin, a medication given in the ED. Dr. Jenny Hazel    2145: Spoke with Dr. Rebekah Hernández, doctor informed of patient's Bacitracin allergy and rash side effect.  No orders given at this time

## 2017-11-29 NOTE — PROGRESS NOTES
conducted an initial consultation and Spiritual Assessment for Yolanda Camacho, who is a 80 y.o.,female. Patients Primary Language is: [place]. According to the patients EMR Church Affiliation is: [employer]. The reason the Patient came to the hospital is:   Patient Active Problem List    Diagnosis Date Noted    Acute renal failure superimposed on stage 3 chronic kidney disease ([facility] 11/28/2017    Atrial fibrillation ([facility] 11/28/2017    Gout 11/28/2017    Chronic venous stasis dermatitis 11/28/2017    Diarrhea 11/28/2017    Cellulitis of right leg 05/02/2017    Pneumonia 11/26/2016    Sepsis ([facility] 11/26/2016    Cellulitis of leg, left 11/26/2016    Cellulitis of left lower extremity 11/18/2016    Wound dehiscence, traumatic injury repair 10/28/2016    Traumatic open wound of right lower leg with infection 10/28/2016        The  provided the following Interventions:  Initiated a relationship of care and support with patient in room 2108 this morning. Listened empathically as she talked about her being here multiple times in the past.  Jinny Quiroz shared what has brought her back this time and her hopes to still be able to return to her own home. Provided information about Spiritual Care Services. Offered prayer and assurance of continued prayers on patients behalf. The following outcomes were achieved:  Patient shared limited information about her medical narrative and spiritual journey/beliefs. Patient processed feeling about current hospitalization. Patient expressed gratitude for pastoral care visit. Assessment:  Patient does not have any Adventist/cultural needs that will affect patients preferences in health care. There are no further spiritual or Adventist issues which require Spiritual Care Services interventions at this time. Plan:  Chaplains will continue to follow and will provide pastoral care on an as needed/requested basis    . Miguel Angel 3   Board Certified [address] [place] [address]   [phone]

## 2017-11-29 NOTE — PROGRESS NOTES
Patient received at beginning of shift from [address], A&Ox4, in bed, awake. Pt does not appear to be in distress and denies any pain and/or discomfort. Safety measures take include bed in lowest locked position, call bell within reach, and personal items at bedside within reach. Pt verbalizes understanding of use of call bell and the need to call for assistance to ensure safety. In response to Sepsis alert, I obtained a set of vitals. I noticed that the patient's heart rate went from sinus rhythm to sinus james in the 40s. Paged Dr. Rhonda Elizalde.       0900: Bladder scanned pt. First scan was 286 mL. Pt then requested to use bedside commode. Post void bladder scan 19 mL.     0850: Spoke with Dr. Rhonda Elizalde about heart rate and morning cardiac meds. Said to give half the Lopressor and give all other cardiac meds as previously ordered. 0920: Spoke with Dr. Rhonda Elizalde about Tele. Said she was fine off of tele. Will check vitals q4 to monitor. 1930: Bedside and Verbal shift change report given to [id number] (oncoming nurse) by Jimmy Oseguera RN (offgoing nurse). Report included the following information SBAR, Kardex, Intake/Output, MAR and Recent Results.

## 2017-11-29 NOTE — ED NOTES
Pt c/o itching at IV site once medication was started. RN noted redness at site and tracking up arm following patient's visible vein. Infusion immediately stopped. MD notified. Benadryl ordered as well as new antibiotic. Levaquin added to list of pt allergies.

## 2017-11-29 NOTE — PROGRESS NOTES
Internal Medicine Progress Note    Patient's Name: Caterina Rosario Date: 11/28/2017  Length of Stay: 0      Assessment/Plan     Active Hospital Problems    Diagnosis Date Noted    Acute on chronic renal failure ([facility] 11/29/2017    UTI (urinary tract infection) 11/29/2017    Acute renal failure superimposed on stage 3 chronic kidney disease ([facility] 11/28/2017    Atrial fibrillation ([facility] 11/28/2017    Gout 11/28/2017    Chronic venous stasis dermatitis 11/28/2017    Diarrhea 11/28/2017     - Cont IVFs per nephro  - Digoxin dose lowered  - HR in 40s at times, will 1/2 BB  - Switched eliquis to xarelto  - Trend BMP  - Urine cult w/ GNR, given weakness, etc will start antibx coverage w/ rocephin  - F/u speciation  - Cont acceptable home medications for chronic conditions   - DVT protocol    I have personally reviewed all pertinent labs and films that have officially resulted over the last 24 hours. I have personally checked for all pending labs that are awaiting final results.     Subjective     Pt s/e @ bedside  No major events overnight  Feeling better this AM  No further diarrhea this AM  Denies CP or SOB    Objective     Visit Vitals    /86    Pulse 67    Temp 96.2 °F (35.7 °C)    Resp 12    Ht 5' (1.524 m)    Wt 67.1 kg (148 lb)    SpO2 100%    BMI 28.9 kg/m2       Physical Exam:  General Appearance: NAD, conversant  Lungs: CTA with normal respiratory effort  CV: IRIR, no m/r/g  Abdomen: soft, non-tender, normal bowel sounds  Extremities: no cyanosis, B/L LE pitting edema, stasis dermatitis  Neuro: No focal deficits, motor/sensory intact    Lab/Data Reviewed:  BMP:   Lab Results   Component Value Date/Time     11/29/2017 04:30 AM    K 5.1 11/29/2017 04:30 AM     11/29/2017 04:30 AM    CO2 17 (L) 11/29/2017 04:30 AM    AGAP 14 11/29/2017 04:30 AM    GLU 67 (L) 11/29/2017 04:30 AM     (H) 11/29/2017 04:30 AM    CREA 4.02 (H) 11/29/2017 04:30 AM    GFRAA 13 (L) 11/29/2017 04:30 AM    GFRNA 10 (L) 11/29/2017 04:30 AM     CBC:   Lab Results   Component Value Date/Time    WBC 3.3 (L) 11/28/2017 04:35 PM    HGB 10.6 (L) 11/28/2017 04:35 PM    HCT 31.7 (L) 11/28/2017 04:35 PM    PLT 98 (L) 11/28/2017 04:35 PM       Imaging Reviewed:  No results found.     Medications Reviewed:  Current Facility-Administered Medications   Medication Dose Route Frequency    metoprolol tartrate (LOPRESSOR) tablet 50 mg  50 mg Oral BID    apixaban (ELIQUIS) tablet 2.5 mg  2.5 mg Oral BID    sodium bicarbonate (8.4%) 75 mEq in dextrose 5 % - 0.45% NaCl 1,000 mL infusion   IntraVENous CONTINUOUS    digoxin (LANOXIN) tablet 0.0625 mg  0.0625 mg Oral DAILY    dilTIAZem CD (CARDIZEM CD) capsule 120 mg  120 mg Oral DAILY    febuxostat (ULORIC) tablet 40 mg  40 mg Oral DAILY    levothyroxine (SYNTHROID) tablet 88 mcg  88 mcg Oral ACB    multivitamin, tx-iron-ca-min (THERA-M w/ IRON) tablet 1 Tab  1 Tab Oral DAILY    simvastatin (ZOCOR) tablet 20 mg  20 mg Oral QHS    traMADol (ULTRAM) tablet 50 mg  50 mg Oral Q6H PRN    acetaminophen (TYLENOL) tablet 650 mg  650 mg Oral Q4H PRN           Fadumo Sibley DO  Internal Medicine, Hospitalist  Pager: [phone]  [address] [place] [address]

## 2017-11-29 NOTE — CONSULTS
Consult Note    Assessment:   · SEJAL on ckd 3. Etio- volume depletion due to diarrhea, poor oral intake and use of lasix. ARB contributed to sejal. · CKD 3 presumably due to htn and age related decrease in gfr. Suspect chronic use of diuretic to address chronic le edema also contributes to chronically elevated bun/scr. · Met acidosis of sejal (predominanly normal ag). · HTN, controlled. · A.fib. Rate is controlled. · Chronic le edema/chronic lt le ulcer due to lymphedema/venous insufficiency rather then volume overload. · Leukocyturia. Asymptomatic. Urine culture is pending. Recommendations:   · Agree with ivf, will use isotonic combination of NaCl and [place]b. · Continue current antihtn. Continue to hold micardis. · Obtain urine Na/creat. · Avoid NSAID's, IV dye. · Avoid Gadolinium due to its association with nephrogenic systemic fibrosis in a patients with severe ARF and ESRD. · Please dose all medications for approximate creatinine clearance <15. Agree with substituting eliquis for xarelto. Also agree with reducing the dose of digoxin, will check level. Thank you. Consult requested by: Rico Schuler DO    ADMIT DATE: 11/28/2017  CONSULT DATE: November 29, 2017                 Admission diagnosis: Acute renal failure superimposed on stage 3 chronic kidney disease [facility])   Reason for Nephrology Consultation: SEJAL on CKD 3. HPI: Skyler Rendon is a 80 y.o. female [facility] with h/o of htn, chronic a.fib, chronic bl le edema, lt le chronic ulcer and ckd 3 for which she is [place] Dr. Garrett Marcos [place] [facility] [place]. Over past 2 weeks patient has developed frequent diarrhea. Appetite also has been poor. She continued to take her bp meds and lasix. Patient presented to the office of Dr. Lurene Ganser where labs were drawn. Patient was subsequently called and instructed to go to ED due to severe sejal. Upon arrival to [facility] ED she was borderline hypotensive but o/w stable. She was started on ivf. Most recent baseline scr on 9/26 was 1.92. Yesterday scr was up to 4.16, today it is 4.02. Past Medical History:   Diagnosis Date    Atrial fibrillation (HCC)     Cellulitis     Gout     Sleep apnea       Past Surgical History:   Procedure Laterality Date    HX HEART CATHETERIZATION         Social History     Social History    Marital status:      Spouse name: N/A    Number of children: N/A    Years of education: N/A     Occupational History    Not on file. Social History Main Topics    Smoking status: Never Smoker    Smokeless tobacco: Never Used    Alcohol use No    Drug use: No    Sexual activity: Not on file     Other Topics Concern    Not on file     Social History Narrative       Family History   Problem Relation Age of Onset    Breast Cancer Child 39    Breast Cancer Maternal Grandmother      Allergies   Allergen Reactions    Augmentin [Amoxicillin-Pot Clavulanate] Unknown (comments)    Bacitracin Rash    Biaxin [Clarithromycin] Unknown (comments)    Clindamycin Rash    Codeine Unknown (comments)    Doxycycline Diarrhea    Levaquin [Levofloxacin] Rash and Itching    Procardia [Nifedipine] Unknown (comments)    Sulfa (Sulfonamide Antibiotics) Diarrhea and Rash    Trimox [Amoxicillin] Unknown (comments)        Home Medications:     Prescriptions Prior to Admission   Medication Sig    Potassium Citrate 15 mEq TbER Take  by mouth.  febuxostat (ULORIC) 40 mg tab tablet Take 40 mg by mouth daily.  traMADol (ULTRAM) 50 mg tablet Take 1 Tab by mouth every six (6) hours as needed. Max Daily Amount: 200 mg.    dilTIAZem XR (DILACOR XR) 120 mg XR capsule Take  by mouth daily.  levothyroxine (SYNTHROID) 88 mcg tablet Take 88 mcg by mouth Daily (before breakfast).  simvastatin (ZOCOR) 20 mg tablet Take 20 mg by mouth nightly.  telmisartan (MICARDIS) 80 mg tablet Take 80 mg by mouth daily.     rivaroxaban (XARELTO) 15 mg tab tablet Take 15 mg by mouth daily (with breakfast).  furosemide (LASIX) 40 mg tablet Take 40 mg by mouth daily.  ergocalciferol (ERGOCALCIFEROL) 50,000 unit capsule Take 400 Units by mouth daily.  multivitamin, tx-iron-ca-min (THERA-M W/ IRON) 9 mg iron-400 mcg tab tablet Take 1 Tab by mouth daily.  Flaxseed Oil oil by Does Not Apply route.  metoprolol tartrate (LOPRESSOR) 50 mg tablet Take 50 mg by mouth two (2) times a day.  digoxin (LANOXIN) 0.125 mg tablet Take 0.0625 mg by mouth daily.  colchicine 0.6 mg tablet Take 0.3 mg by mouth daily. Current Inpatient Medications:     Current Facility-Administered Medications   Medication Dose Route Frequency    metoprolol tartrate (LOPRESSOR) tablet 50 mg  50 mg Oral BID    apixaban (ELIQUIS) tablet 2.5 mg  2.5 mg Oral BID    digoxin (LANOXIN) tablet 0.0625 mg  0.0625 mg Oral DAILY    dilTIAZem CD (CARDIZEM CD) capsule 120 mg  120 mg Oral DAILY    febuxostat (ULORIC) tablet 40 mg  40 mg Oral DAILY    levothyroxine (SYNTHROID) tablet 88 mcg  88 mcg Oral ACB    multivitamin, tx-iron-ca-min (THERA-M w/ IRON) tablet 1 Tab  1 Tab Oral DAILY    potassium citrate (UROCIT-K10) tablet 10 mEq  10 mEq Oral DAILY    simvastatin (ZOCOR) tablet 20 mg  20 mg Oral QHS    traMADol (ULTRAM) tablet 50 mg  50 mg Oral Q6H PRN    dextrose 5% and 0.9% NaCl infusion  75 mL/hr IntraVENous CONTINUOUS    acetaminophen (TYLENOL) tablet 650 mg  650 mg Oral Q4H PRN       Review of Systems:   C/o fatigue. No fever or chills. No sore throat. No cough or hemoptysis. No shortness of breath or chest pain. No orthopnea or paroxysmal nocturnal dyspnea. Poor appetite. No nausea, vomiting, abdominal pain, melena or hematochezia. No constipation. No dysuria, no gross hematuria of voiding difficulties. No muscle aches. No skin changes. No dizziness or lightheadedness. No headaches.        Physical Assessment:     Vitals:    11/28/17 2257 11/29/17 0231 11/29/17 0644 11/29/17 0810   BP: 111/75 102/68 113/45 110/53   Pulse: 84 67 (!) 42 64   Resp:  16 16 12   Temp:   94.9 °F (34.9 °C) 96.2 °F (35.7 °C)   SpO2:  97%  100%   Weight:       Height:         Last 3 Recorded Weights in this Encounter    11/28/17 1533   Weight: 67.1 kg (148 lb)     Admission weight: Weight: 67.1 kg (148 lb) (11/28/17 1533)      Intake/Output Summary (Last 24 hours) at 11/29/17 1207  Last data filed at 11/29/17 0900   Gross per 24 hour   Intake              240 ml   Output                0 ml   Net              240 ml       Patient is in no apparent distress. HEENT: Head is normocephalic and atraumatic. Pupils are round, equal, reactive to light. Sclerae are anicteric. Oral mucosa is dry. Neck: no cervical lymphadenopathy or thyromegaly. Lungs: good air entry, clear to auscultation bilaterally. Trachea at the midline. Cardiovascular system: S1, S2, irregular. No murmurs, gallops or rubs. No jvd. Carotid upstroke 2 + bilaterally. Abdomen: soft, non tender, non distended. Positive bowel sounds. No hepatosplenomegaly. No abdominal bruits. Extremities: no clubbing, cyanosis. 2+ bl pretibial edema. Delayed capillary refill on the toes bilaterally. Lt shin with large ulcer, irregular borders, no purulence. Pretibial skin is erythematous, cyanotic bl. Integumentary: skin is grossly intact. Neurologic: Alert, oriented time three. Cooperative and appropriate. No gross motor or sensory deficits. Data Review:    Labs: Results:       Chemistry Recent Labs      11/29/17   0430  11/28/17   1635   GLU  67*  68*   NA  138  137   K  5.1  5.1   CL  107  106   CO2  17*  16*   BUN  132*  129*   CREA  4.02*  4.16*   CA  9.5  9.5   AGAP  14  15   BUCR  33*  31*         CBC w/Diff Recent Labs      11/28/17   1635   WBC  3.3*   RBC  3.44*   HGB  10.6*   HCT  31.7*   PLT  98*   GRANS  59   LYMPH  15*   EOS  14*         Iron/Ferritin No results for input(s): IRON in the last 72 hours.     No lab exists for component: TIBCCALC PTH/VIT D No results for input(s): PTH in the last 72 hours.     No lab exists for component: MELBA Morris M.D  [facility]  Office [phone]  Pager [phone]    November 29, 2017

## 2017-11-29 NOTE — PROGRESS NOTES
[facility]   Discharge Planning/ Assessment    Reasons for Intervention: Spoke with pt, lives alone. Has hired cargivers 830 am to 1030 pm. They assist her with adls she amb with a walker, also has a wc. Has a handicap BR. Has used hh and wd clinic services in past.pcp dr ring. obs letter, copy to chart. Pt was then changed to inpt. Plan home follow for hh services.     High Risk Criteria  [x] Yes  []No   Physician Referral  [] Yes  [x]No        Date    Nursing Referral  [] Yes  [x]No        Date    Patient/Family Request  [] Yes  [x]No        Date       Resources:    Medicare  [x] Yes  []No   Medicaid  [] Yes  []No   No Resources  [] Yes  []No   Private Insurance  [x] Yes  []No    Name/Phone Number    Other  [] Yes  []No        (i.e. Workman's Comp)         Prior Services:    Prior Services  [x] Yes  []No   Home Health  [x] Yes  []No   [address]  [] Yes  []No        Number of [address]  [] Yes  []No       Meals on Wheels  [] Yes  []No   Office on Aging  [] Yes  []No   Transportation Services  [] Yes  []No   [address]  [] Yes  []No        Nursing Home Name    [address]  [] Yes  []No        [address] Name    Other       Information Source:      Information obtained from  [x] Patient  [] Parent   [] [address]   [] Child  [] Spouse   [] Significant Other/Partner   [] Friend      [] EMS    [] Nursing Home Chart          [] Other:   Chart Review  [] Yes  []No     Family/Support System:    Patient lives with  [x] Alone    [] Spouse   [] Significant Other  [] Children  [] Caretaker   [] Parent  [] Sibling     [] Other       Other Support System:    Is the patient responsible for care of others  [] Yes  [x]No   Information of person caring for patient on  discharge    Managers financial affairs independently  [] Yes  [x]No   If no, explain:      Status Prior to Admission:    Mental Status  [] Awake  [] Alert  [x] Oriented  [] Quiet/Calm [] Lethargic/Sedated   [] Disoriented  [] Restless/Anxious  [] Combative   Personal Care  [] Dependent  [] [address]  [x] Requires Assistance   Meal Preparation Ability  [] Independent   [] Standby Assistance   [x] Minimal Assistance   [] Moderate Assistance  [] Maximum Assistance     [] Total Assistance   Chores  [] Independent with Chores   [] N/A Nursing Home Resident   [x] Requires Assistance   Bowel/Bladder  [] Continent  [] Catheter  [] Incontinent  [] Ostomy Self-Care    [] Urine Diversion Self-Care  [] Maximum Assistance     [] Total Assistance   Number of Persons needed for assistance    DME at home  [] Fahad Wood  [] Talya Wood   [] Commode    [] Bathroom/Grab Bars  [] Hospital Bed  [] Nebulizer  [] Oxygen           [] Raised Toilet Seat  [] Shower Chair  [] Side Rails for Bed   [] Tub Transfer Bench   [x] Walker, Rolling  [] Vikram Christen, Standard      [x] Other:w/c   Vendor      Treatment Presently Receiving:    Current Treatments  [] Chemotherapy  [] Dialysis  [] Insulin  [] IVAB [] IVF   [] O2  [] PCA   [] PT   [] RT   [] Tube Feedings   [] Wound Care     Psychosocial Evaluation:    Verbalized Knowledge of Disease Process  [x] Patient  []Family   Coping with Disease Process  [] Patient  []Family   Requires Further Counseling Coping with Disease Process  [] Patient  []Family     Identified Projected Needs:    Home Health Aid  [] Yes  []No   Transportation  [] Yes  []No   Education  [] Yes  []No        Specific Education     Financial Counseling  [] Yes  []No   Inability to Care for Self/Will Require 24 hour care  [x] Yes  []No   Pain Management  [] Yes  []No   Home Infusion Therapy  [] Yes  []No   Oxygen Therapy  [] Yes  []No   DME  [] Yes  []No   Long Term Care Placement  [] Yes  []No   Rehab  [] Yes  []No   Physical Therapy  [] Yes  []No   Needs Anticipated At This Time  [x] Yes  []No     Intra-Hospital Referral:    [facility]  [] Yes  [x]No     [] Yes [x]No   Patient Representative  [] Yes  [x]No   Staff for Teaching Needs  [] Yes  [x]No   Specialty Teaching Needs     Diabetic Educator  [] Yes  [x]No   Referral for Diabetic Educator Needed  [] Yes  [x]No  If Yes, place order for Nutritionist or Diabetic Consult     Tentative Discharge Plan:    Home with No Services  [] Yes  []No   Home with [address]  [x] Yes  []No        If Yes, specify type    [address]  [] Yes  []No        If Yes, specify type    Meals on Wheels  [] Yes  []No   Office of Aging  [] Yes  []No   NHP  [] Yes  []No   Return to the [employer]  [] Yes  []No   Rehab Therapy  [] Yes  []No   Acute Rehab  [] Yes  []No   Subacute Rehab  [] Yes  []No   Private Care  [] Yes  []No   Substance Abuse Referral  [] Yes  []No   Transportation  [] Yes  []No   Chore Service  [] Yes  []No   Inpatient Hospice  [] Yes  []No   OP RT  [] Yes  [] No   OP Hemo  [] Yes  [] No   OP PT  [] Yes  []No   Support Group  [] Yes  []No   Reach to Recovery  [] Yes  []No   OP Oncology Clinic  [] Yes  []No   Clinic Appointment  [] Yes  []No   DME  [] Yes  []No   Comments    Name of D/C Planner or  Given to Patient or Family Felicity pizano rn, cm    Phone Number [phone]        Extension    Date 11/29/17   Time    If you are discharged home, whom do you designate to participate in your discharge plan and receive any information needed?      Enter name of designee         Phone # of designee         Address of designee         Updated         Patient refused to designate any           individual

## 2017-11-29 NOTE — PROGRESS NOTES
Problem: Falls - Risk of  Goal: *Absence of Falls  Document Cornell Fall Risk and appropriate interventions in the flowsheet.    Outcome: Progressing Towards Goal  Fall Risk Interventions:

## 2017-11-29 NOTE — PROGRESS NOTES
Patient has designated ___________dtr_____________ to participate in his/her discharge plan and to receive any needed information.      Name: Ne Cardozo  Address:  Phone number:[phone]

## 2017-11-29 NOTE — PROGRESS NOTES
0820: Bedside and Verbal shift change report given to Hardik Markham RN (oncoming nurse) by ROLF Burger (offgoing nurse). Report included the following information SBAR, Kardex, and MAR.

## 2017-11-30 PROBLEM — I48.20 CHRONIC ATRIAL FIBRILLATION (HCC): Status: ACTIVE | Noted: 2017-01-01

## 2017-11-30 NOTE — PROGRESS NOTES
Attempted twice to see for evalatuion first time  Getting cleaned up  With CNA second time in  evaluation with OT will attempt tomorrow,.

## 2017-11-30 NOTE — PROGRESS NOTES
OT order received and chart reviewed. 1042: 1st attempt for OT tx session; RNs present cleaning up pt. Will follow up.     Sherrie Vazquez MS OTR/L  Office Ext: [phone]  Pager: [phone]

## 2017-11-30 NOTE — PROGRESS NOTES
Internal Medicine Progress Note    Patient's Name: Jose M Harper  Admit Date: 11/28/2017  Length of Stay: 1      Assessment/Plan     C/Jennie Pennington 1106 Problems    Diagnosis Date Noted    Acute on chronic renal failure ([facility] 11/29/2017    UTI (urinary tract infection) 11/29/2017    Acute renal failure superimposed on stage 3 chronic kidney disease ([facility] 11/28/2017    Chronic atrial fibrillation ([facility] 11/28/2017    Gout 11/28/2017    Chronic venous stasis dermatitis 11/28/2017    Diarrhea 11/28/2017     - Cont IVFs with bicarb per nephro  - Varela cath per nephro  - HR in low 30s earlier, will d/c BB  - Cont eliquis  - Trend BMP  - Urine cult w/ kleb pneu  - Cont acceptable home medications for chronic conditions   - DVT protocol    I have personally reviewed all pertinent labs and films that have officially resulted over the last 24 hours. I have personally checked for all pending labs that are awaiting final results.     Subjective     Pt s/e @ bedside  No major events overnight  Admits to feeling better  Constipated now  Denies CP or SOB    Objective     Visit Vitals    BP 93/55 (BP 1 Location: Right arm, BP Patient Position: At rest;Sitting;Supine)    Pulse 80    Temp (!) 93.9 °F (34.4 °C)    Resp 16    Ht 5' (1.524 m)    Wt 67.1 kg (148 lb)    SpO2 96%    BMI 28.9 kg/m2       Physical Exam:  General Appearance: NAD, conversant  Lungs: CTA with normal respiratory effort  CV: IRIR, no m/r/g  Abdomen: soft, non-tender, normal bowel sounds  Extremities: no cyanosis, B/L LE pitting edema, stasis dermatitis  Neuro: No focal deficits, motor/sensory intact    Lab/Data Reviewed:  BMP:   Lab Results   Component Value Date/Time     11/30/2017 04:00 AM    K 5.1 11/30/2017 04:00 AM     11/30/2017 04:00 AM    CO2 21 11/30/2017 04:00 AM    AGAP 10 11/30/2017 04:00 AM    GLU 95 11/30/2017 04:00 AM     (H) 11/30/2017 04:00 AM    CREA 4.02 (H) 11/30/2017 04:00 AM    GFRAA 13 (L) 11/30/2017 04:00 AM GFRNA 10 (L) 11/30/2017 04:00 AM     CBC:   No results found for: WBC, HGB, HGBEXT, HCT, HCTEXT, PLT, PLTEXT, HGBEXT, HCTEXT, PLTEXT    Imaging Reviewed:  No results found.     Medications Reviewed:  Current Facility-Administered Medications   Medication Dose Route Frequency    bisacodyl (DULCOLAX) tablet 10 mg  10 mg Oral NOW    apixaban (ELIQUIS) tablet 2.5 mg  2.5 mg Oral BID    sodium bicarbonate (8.4%) 75 mEq in dextrose 5 % - 0.45% NaCl 1,000 mL infusion   IntraVENous CONTINUOUS    cefTRIAXone (ROCEPHIN) 1 g in sterile water (preservative free) 10 mL IV syringe  1 g IntraVENous Q24H    digoxin (LANOXIN) tablet 0.0625 mg  0.0625 mg Oral DAILY    dilTIAZem CD (CARDIZEM CD) capsule 120 mg  120 mg Oral DAILY    febuxostat (ULORIC) tablet 40 mg  40 mg Oral DAILY    levothyroxine (SYNTHROID) tablet 88 mcg  88 mcg Oral ACB    multivitamin, tx-iron-ca-min (THERA-M w/ IRON) tablet 1 Tab  1 Tab Oral DAILY    simvastatin (ZOCOR) tablet 20 mg  20 mg Oral QHS    traMADol (ULTRAM) tablet 50 mg  50 mg Oral Q6H PRN    acetaminophen (TYLENOL) tablet 650 mg  650 mg Oral Q4H PRN           Allan Boyd DO  Internal Medicine, Hospitalist  Pager: [phone]  [address] [place] [address]

## 2017-11-30 NOTE — PROGRESS NOTES
Patient received at beginning of shift from [facility], &Ox4, in bed, awake. Pt does not appear to be in distress and denies any pain and/or discomfort. Safety measures take include bed in lowest locked position, call bell within reach, and personal items at bedside within reach. Pt verbalizes understanding of use of call bell and the need to call for assistance to ensure safety. 1350: Therapy reported rectal bleeding. Paged Dr. Ezio Petit. Patient also reported pain that is not control by medication.

## 2017-11-30 NOTE — PROGRESS NOTES
Problem: Falls - Risk of  Goal: *Absence of Falls  Document Cornell Fall Risk and appropriate interventions in the flowsheet.    Outcome: Progressing Towards Goal  Fall Risk Interventions:  Mobility Interventions: Patient to call before getting OOB         Medication Interventions: Patient to call before getting OOB    Elimination Interventions: Patient to call for help with toileting needs

## 2017-11-30 NOTE — PROGRESS NOTES
RENAL PROGRESS NOTE        June Camacho         Assessment/Plan:     1. SEJAL on CKD3: nonoliguric. BUN falling, Creat stable. 2. Met Acidosis: IVF with bicarb. 3. HTN: controlled. 4. UTI: Rocephin. Place Varela for better output charting. Subjective:  Patient complaints of: No complaints. No SOB/CP/N/V. Patient Active Problem List   Diagnosis Code    Wound dehiscence, traumatic injury repair T81.33XA    Traumatic open wound of right lower leg with infection S81.801A, L08.9    Cellulitis of left lower extremity L03. 116    Pneumonia J18.9    Sepsis (HCC) A41.9    Cellulitis of leg, left L03. 116    Cellulitis of right leg L03.115    Acute renal failure superimposed on stage 3 chronic kidney disease (HCC) N17.9, N18.3    Atrial fibrillation (HCC) I48.91    Gout M10.9    Chronic venous stasis dermatitis I87.2    Diarrhea R19.7    Acute on chronic renal failure (HCC) N17.9, N18.9    UTI (urinary tract infection) N39.0       Current Facility-Administered Medications   Medication Dose Route Frequency Provider Last Rate Last Dose    apixaban (ELIQUIS) tablet 2.5 mg  2.5 mg Oral BID Irven Saver, DO   2.5 mg at 11/29/17 1813    sodium bicarbonate (8.4%) 75 mEq in dextrose 5 % - 0.45% NaCl 1,000 mL infusion   IntraVENous CONTINUOUS Lydia Aaron MD   Stopped at 11/30/17 0027    cefTRIAXone (ROCEPHIN) 1 g in sterile water (preservative free) 10 mL IV syringe  1 g IntraVENous Q24H Irven Saver, DO   1 g at 11/29/17 1508    digoxin (LANOXIN) tablet 0.0625 mg  0.0625 mg Oral DAILY Irven Saver, DO   0.0625 mg at 11/29/17 1220    dilTIAZem CD (CARDIZEM CD) capsule 120 mg  120 mg Oral DAILY Irven Saver, DO   120 mg at 11/29/17 1221    febuxostat (ULORIC) tablet 40 mg  40 mg Oral DAILY Irven Saver, DO        levothyroxine (SYNTHROID) tablet 88 mcg  88 mcg Oral ACB Bri Preciadoin, DO   88 mcg at 11/29/17 1239    multivitamin, tx-iron-ca-min (THERA-M w/ IRON) tablet 1 Tab  1 Tab Oral DAILY Bri Preciadoin, DO   1 Tab at 11/29/17 1221    simvastatin (ZOCOR) tablet 20 mg  20 mg Oral QHS Bri Preciadoin, DO   20 mg at 11/29/17 2330    traMADol (ULTRAM) tablet 50 mg  50 mg Oral Q6H PRN Bri Preciadoin, DO   50 mg at 11/29/17 1813    acetaminophen (TYLENOL) tablet 650 mg  650 mg Oral Q4H PRN Bri Preciadoin, DO   650 mg at 11/29/17 2022       Objective  Vitals:    11/29/17 1739 11/29/17 2302 11/30/17 0221 11/30/17 0615   BP: 101/58 94/66 98/65 95/57   Pulse: (!) 59 (!) 52 (!) 37 (!) 31   Resp: 14 14 15 14   Temp: 96.2 °F (35.7 °C)  98.6 °F (37 °C) (!) 93.9 °F (34.4 °C)   SpO2: 97% 99% 97% 92%   Weight:       Height:             Intake/Output Summary (Last 24 hours) at 11/30/17 1007  Last data filed at 11/30/17 0650   Gross per 24 hour   Intake              545 ml   Output                0 ml   Net              545 ml           Admission weight: Weight: 67.1 kg (148 lb) (11/28/17 1533)  Last Weight Metrics:  Weight Loss Metrics 11/28/2017 9/26/2017 7/9/2017 5/1/2017 11/26/2016   Today's Wt 148 lb 132 lb 142 lb 145 lb 154 lb   BMI 28.9 kg/m2 26.66 kg/m2 28.68 kg/m2 29.29 kg/m2 31.1 kg/m2             Physical Assessment:     General: NAD, alert and oriented. Neck: No jvd. LUNGS: Clear to Auscultation, No rales, rhonchi or wheezes. CVS EXM: S1, S2  RRR, no murmurs/gallops/rubs. Abdomen: soft, non tender. Lower Extremities:  + edema.        Lab    CBC w/Diff Recent Labs      11/28/17   1635   WBC  3.3*   RBC  3.44*   HGB  10.6*   HCT  31.7*   PLT  98*   GRANS  59   LYMPH  15*   EOS  14*        Chemistry Recent Labs      11/30/17   0400  11/29/17   0430  11/28/17   1635   GLU  95  67*  68*   NA  138  138  137   K  5.1  5.1  5.1   CL  107  107  106   CO2  21  17*  16*   BUN  122*  132*  129*   CREA  4.02*  4.02*  4.16*   CA  8.8  9.5 9. 5   AGAP  10  14  15   BUCR  30*  33*  31*   ALB  3.1*   --    --    PHOS  8.3*   --    --          No results found for: IRON, FE, TIBC, IBCT, PSAT, FERR Lab Results   Component Value Date/Time    Calcium 8.8 11/30/2017 04:00 AM    Phosphorus 8.3 11/30/2017 04:00 AM        Neo Wray MD  [facility]  Pager: [phone]

## 2017-11-30 NOTE — PROGRESS NOTES
Problem: Self Care Deficits Care Plan (Adult)  Goal: *Acute Goals and Plan of Care (Insert Text)  Occupational Therapy Goals  Initiated 11/30/2017 within 7 day(s). 1.  Patient will perform grooming tasks while standing with supervision for balance. 2.  Patient will perform lower body dressing with minimal assistance utilizing AE, prn.  3.  Patient will perform functional task in standing for 8 minutes with supervision for balance to increase activity tolerance for ADLs. 4.  Patient will perform toilet transfers with supervision/set-up. 5.  Patient will perform all aspects of toileting with minimal assistance. 6.  Patient will participate in upper extremity therapeutic exercise/activities with supervision/set-up for 8 minutes to increase BUE strength and activity tolerance for increased participation in ADLs. 7.  Patient will utilize energy conservation techniques during functional activities with minimal verbal cues. Outcome: Progressing Towards Goal  Occupational Therapy EVALUATION    Patient: Yolanda Haro (80 y.o. female)  Date: 11/30/2017  Primary Diagnosis: Acute renal failure superimposed on stage 3 chronic kidney disease (HCC)  Acute on chronic renal failure [facility])        Precautions: Fall    ASSESSMENT :  Based on the objective data described below, the patient presents with impairments with regard to activity tolerance, functional mobility and independence in ADLs. Pt supine on arrival, no c/o pain. Min A/additional time for bed mobility; impaired sitting balance due to generalized weakness & back pain. Pt tolerated ~10 mins at EOB during self-feeding to increase activity tolerance & dynamic sitting balance. Mod A to stand from EOB; min A to maneuver to [facility]. Max/total A for pericare; Therapist observed blood during bowel hygiene; Kellie Ghosh RN notified. Pt left in recliner chair, BLEs elevated in chair & propped on pillow, pt reports comfort and no other concerns.  Pt/son educated on role of OT and POC; they verbalized understanding. Needs left within reach. Patient will benefit from skilled intervention to address the above impairments. Patients rehabilitation potential is considered to be Good  Factors which may influence rehabilitation potential include:   []             None noted  []             Mental ability/status  [x]             Medical condition  []             Home/family situation and support systems  []             Safety awareness  []             Pain tolerance/management  []             Other:     Recommendations for nursing: up with assist x1 & RW  Verbally communicated to: Jose Daniel Valerio RN       PLAN :  Recommendations and Planned Interventions:  [x]               Self Care Training                  [x]        Therapeutic Activities  [x]               Functional Mobility Training    []        Cognitive Retraining  [x]               Therapeutic Exercises           [x]        Endurance Activities  [x]               Balance Training                   []        Neuromuscular Re-Education  []               Visual/Perceptual Training     [x]   Home Safety Training  [x]               Patient Education                 [x]        Family Training/Education  []               Other (comment):    Frequency/Duration: Patient will be followed by occupational therapy 3-5 times a week to address goals. Discharge Recommendations: Short rehab stay vs. [facility] (pending assistance at home)  Further Equipment Recommendations for Discharge: shower chair     SUBJECTIVE:   Patient stated This bag is bothering me\" (referring to sofie).     OBJECTIVE DATA SUMMARY:     Past Medical History:   Diagnosis Date    Atrial fibrillation ([address]     Cellulitis     Gout     Sleep apnea      Past Surgical History:   Procedure Laterality Date    HX HEART CATHETERIZATION       Barriers to Learning/Limitations: None  Compensate with: visual, verbal, tactile, kinesthetic cues/model    GCODES:  Self Care  Current  CL= 60-79%   Goal  CJ= 20-39%. The severity rating is based on the Other Functional Assessment, MMT, ROM    Eval Complexity: History: MEDIUM Complexity : Expanded review of history including physical, cognitive and psychosocial  history ; Examination: MEDIUM Complexity : 3-5 performance deficits relating to physical, cognitive , or psychosocial skils that result in activity limitations and / or participation restrictions; Decision Making:MEDIUM Complexity : Patient may present with comorbidities that affect occupational performnce. Miniml to moderate modification of tasks or assistance (eg, physical or verbal ) with assesment(s) is necessary to enable patient to complete evaluation     Prior Level of Function/Home Situation: Pt was somewhat independent with basic self care tasks and utilized RW for functional mobility PTA. Pt reports having assistance with IADLs PTA. Home Situation  Home Environment: Private residence  # Steps to Enter: 2  Rails to Enter: Yes  Hand Rails : Bilateral  One/Two Story Residence: One story  Living Alone: Yes  Support Systems: Family member(s)  Patient Expects to be Discharged to[de-identified] Private residence  Current DME Used/Available at Home: Cole Avila, litzy, Grab bars  Tub or Shower Type: Shower (has grab bars; no shower chair)  [x]  Right hand dominant   []  Left hand dominant    Cognitive/Behavioral Status:  Neurologic State: Alert  Orientation Level: Oriented to person;Oriented to place;Oriented to situation  Cognition: Appropriate decision making; Appropriate for age attention/concentration; Appropriate safety awareness; Follows commands  Safety/Judgement: Awareness of environment; Fall prevention     Skin: Intact (BUEs)  Edema: None noted (BUEs)    Vision/Perceptual:    Acuity: Able to read clock/calendar on wall without difficulty    Corrective Lenses: Glasses     Coordination:  Coordination: Within functional limits (BUEs)  Fine Motor Skills-Upper: Right Intact; Left Intact    Gross Motor Skills-Upper: Right Intact; Left Intact     Balance:  Sitting: Impaired  Sitting - Static: Fair (occasional)  Sitting - Dynamic: Fair (occasional)  Standing: Impaired; With support  Standing - Static: Fair  Standing - Dynamic : Fair     Strength:  Strength: Generally decreased, functional (BUEs: approx 3+/5)    Tone & Sensation:  Sensation: Intact (BUEs)    Range of Motion:  AROM: Generally decreased, functional (BUEs: approx 3/4 shoulder flex)  PROM: Within functional limits (BUEs)    Functional Mobility and Transfers for ADLs:  Bed Mobility:  Supine to Sit: Minimum assistance; Additional time  Sit to Supine:  (not assessed; pt left up in chair)  Scooting: Minimum assistance; Additional time;Assist x1     Transfers:  Sit to Stand: Moderate assistance; Additional time;Assist x1  Bed to Chair: Minimum assistance;Assist x1   Toilet Transfer : Minimum assistance; Additional time;Assist x1    ADL Assessment:  Feeding: Setup;Supervision  Oral Facial Hygiene/Grooming: Setup;Supervision  Bathing: Maximum assistance  Upper Body Dressing: Minimum assistance  Lower Body Dressing: Maximum assistance  Toileting: Total assistance (carrizales catheter)    ADL Intervention:  Toileting  Toileting Assistance: Maximum assistance  Bowel Hygiene: Maximum assistance  Clothing Management: Minimum assistance  Cues: Physical assistance for pants up;Verbal cues provided  Adaptive Equipment: Elevated seat;Walker    Min A for [facility] transfer with skilled instruction on BUE positioning. Max/total A for pericare secondary to fair supported balance with RW. Cognitive Retraining  Safety/Judgement: Awareness of environment; Fall prevention    Therapeutic Activity:  Pt tolerated ~10 mins at EOB during self-feeding to increase activity tolerance and static/dynamic sitting balance in prep for ADLs. Functional mobility from EOB --> BSC and BSC --> recliner chair in prep for bathroom mobility with RW and fair balance.  Pt demo'd fair activity tolerance and safety awareness during functional transfers. Pain:  Pre treatment pain level: 0/10  Post treatment pain level: 0/10  Pain Scale 1: Visual    Activity Tolerance:  Fair  Please refer to the flowsheet for vital signs taken during this treatment. After treatment:   [x] Patient left in no apparent distress sitting up in chair  [] Patient left in no apparent distress in bed  [x] Call bell left within reach  [x] Nursing notified  [x] Caregiver present  [] Bed alarm activated    COMMUNICATION/EDUCATION: Pt educated on role of  OT and POC; she verbalized understanding. [x] Home safety education was provided and the patient/caregiver indicated understanding. [x] Patient/family have participated as able in goal setting and plan of care. [x] Patient/family agree to work toward stated goals and plan of care. [] Patient understands intent and goals of therapy, but is neutral about his/her participation. [] Patient is unable to participate in goal setting and plan of care.     Thank you for this referral.    Warren Sullivan MS OTR/L  Time Calculation: 35 mins

## 2017-11-30 NOTE — WOUND CARE
Patient was seen today at bedside per consult request for lower extremity ulcers. The patient is lying in bed sleeping, but she is easily roused and awake and alert once she is addressed. She states \"I can't get any sleep here. \" Mrs. Betty Boston is well known to wound care from previous admission and outpatient treatment in the wound clinic for a lower leg ulcer. Her son is currently at her bedside. The patient states she has been going to [facility]'s wound care clinic for lymphedema treatments, where they have been applying \"thermal wraps\" which, when removed, caused two blisters to appear on her lower legs. Today the wounds were cleansed, assessed, and measured. There are two ulcers on her bilateral lower legs which appear to be flattened unroofed blisters. The wounds appear to be caused by an external device, which is consistent with the patient's report. Both blisters being on the right medial and left medial leg respectively and at the same height. They are dry and appear to be healing well. Her lower legs are edematous and the skin is thin and fragile. A dressing of silver foam was applied to promote faster healing and also for protection. Roll gauze was used as the patient is allergic to any kind of tape. Her heels were off-loaded with Prevalon boots which the patient and her son understand must be removed before she gets out of bed, due to risk of fall. Off-loading wedges are also placed under her back and legs to protect the sacral area as we cannot use a sacral foam dressing either. Report was given to patient's nurse, Raquel.

## 2017-11-30 NOTE — CDMP QUERY
For ICD 10 specificity , please clarify type of atrial fibrillation:     1) chronic  2) paroxysmal   3) permanent   4) persistent      Thank you     Angelito Cary RN [facility] [phone]

## 2017-11-30 NOTE — PROGRESS NOTES
Bedside and Verbal shift change report given to Gill Saeed, [facility] (oncoming nurse) by Jose Daniel Valerio RN (offgoing nurse). Report included the following information SBAR, Kardex and MAR. Assessment completed. Patient AAOX4, calm and cooperative. SonDa at bedside. Chair locked. Call bell within reach. Patient resting comfortably in chair. 2015-  Patient c/o nausea. No antiemetics ordered. Will page doctor. 2020-Bedside and Verbal shift change report given to Katie Saavedra RN (oncoming nurse) by Gill Saeed RN (offgoing nurse). Report included the following information SBAR, Kardex and MAR. Patient resting in bed, family at bedside. Patient requested to bring her sleep pap from home. Patient has sleep apnea. SonDa said he will bring the cpap machine in in the am.  Notified PM RN to alert attending and [facility] for orders for use and approval of equipment in the am.  Notified Dr. Kaylan Fox in regards to patient feeling nauceous. Dr. Kaylan Fox ordered 4mg of Zofran IV q6hr PRN.  trb

## 2017-11-30 NOTE — PROGRESS NOTES
0715 : Bedside and Verbal shift change report given to Feroz Connolly RN (oncoming nurse) by Eliseo Olvera RN (offgoing nurse). Report included the following information SBAR, Kardex and MAR   .

## 2017-11-30 NOTE — PROGRESS NOTES
1930: Assumed patient care. Received report from Shira Jaimes [facility] (offgoing nurse). Report included SBAR, KARDEX, and MAR. Patient resting in bed, states mild pain 8/10. Stated I will bring tylenol after I finish getting report on the rest of patients.  Patient verbalized understanding and acceptance    2022: Performed complex assessment- please see corresponding flowsheet

## 2017-12-01 PROBLEM — D61.818 PANCYTOPENIA (HCC): Status: ACTIVE | Noted: 2017-01-01

## 2017-12-01 NOTE — CONSULTS
[facility] - Consult Note    Consultation request by Dr. Aubrey Gutierrez for advice/opinion related to evaluating bradycardia    Date of  Admission: 11/28/2017  3:34 PM   Primary Care Physician:  Robby Woodruff MD      I saw, evaluated, interviewed and examined the patient personally. I agree with the findings and plan of care as documented below with PA-C note  Admitted with fatigue  Bradycardia , a.fib and Acute on CKD with creat uto 4.1 now  Marginal U/O  Cardiology asked to help management bradycardia  Now with severe james and Low BP  Free T4 leval WNL. ECHO today  Start dopamine infusion with Goal BP MAP > 60 and HR > 50   DC all AV gallo blocking agent including Digoxin, BB and CCB  Transfer to ICU if needed  DW patient and son about PPM if needed. She appears to be aggregable but considering her advance age, comorbidity and ?? UTI and advance kidney disease, I believe she is suboptimals candidate. WE discussed this with son. Recommend palliative care . DW dr. Aubrey Gutierrez as well. Iveth Bhakta MD       Assessment:     -Acute renal failure, Cr stable ~4, most recent Cr otherwise prior to admission 1.92  -Bradycardia  -Hx atrial fibrillation, was on chronic therapy with Metoprolol, Cardizem and Digoxin, all of which have been stopped this admission  -UTI, UCx 11/28/17 positive for Klebsiella Pneumoniae, on Rocephin  -Recent treatment for bilateral lower extremity cellultis  -Hx sleep apnea     Pt does not currently have a cardiologist.     Plan:     Pt has been taken off her home Metoprolol, Cardizem and Digoxin. Continue to monitor over the next few days while off these medications. Dopamine drip ordered. Pt said she would consider a pacemaker. History of Present Illness: This is a 80 y.o. female admitted for Acute renal failure superimposed on stage 3 chronic kidney disease ([facility] [address]  Acute on chronic renal failure ([facility] [address] [address]     Patient complains of:  Fatigue    Pt is a 81 yo F with Hx atrial fibrillation, CKD, gout, venous stasis who presented to the ED due to abnormal labs. Pt reported 2-3 week history of fatigue, weakness, poor appetite, persistent diarrhea, and weight gain. She was recently on antibiotics as outpatient due to cellulitis. We were asked to consult on patient due to hypotension and bradycardia. Patient denies any MI or CHF in past.  Has a.fib  Takes BB,CCB and digoxin at home  Currently feels timed and fatigue. No dizziness     Cardiac risk factors: post-menopausal      Review of Symptoms:  Except as stated above include:  Constitutional:  + fatigue, weight gain  Respiratory:  negative  Cardiovascular:  negative  Gastrointestinal: + diarrhea, decreased appetite  Genitourinary:  negative  Musculoskeletal:  + leg swelling  Neurological:  Negative  Dermatological:  Negative  Endocrinological: Negative  Psychological:  Negative       Past Medical History:     Past Medical History:   Diagnosis Date    Atrial fibrillation (HCC)     Cellulitis     Gout     Sleep apnea          Social History:     Social History     Social History    Marital status:      Spouse name: N/A    Number of children: N/A    Years of education: N/A     Social History Main Topics    Smoking status: Never Smoker    Smokeless tobacco: Never Used    Alcohol use No    Drug use: No    Sexual activity: Not Asked     Other Topics Concern    None     Social History Narrative        Family History:     Family History   Problem Relation Age of Onset    Breast Cancer Child 39    Breast Cancer Maternal Grandmother         Medications:      Allergies   Allergen Reactions    Augmentin [Amoxicillin-Pot Clavulanate] Unknown (comments)    Bacitracin Rash    Biaxin [Clarithromycin] Unknown (comments)    Clindamycin Rash    Codeine Unknown (comments)    Doxycycline Diarrhea    Levaquin [Levofloxacin] Rash and Itching    Procardia [Nifedipine] Unknown (comments)    Sulfa (Sulfonamide Antibiotics) Diarrhea and Rash    Trimox [Amoxicillin] Unknown (comments)        Current Facility-Administered Medications   Medication Dose Route Frequency    melatonin tablet 1.5 mg  1.5 mg Oral QHS    DOPamine in 5 % dextrose (INTROPIN) 800 mg/500 mL (1,600 mcg/mL) infusion  0-20 mcg/kg/min IntraVENous TITRATE    ondansetron (ZOFRAN) injection 4 mg  4 mg IntraVENous Q6H PRN    sodium bicarbonate (8.4%) 75 mEq in dextrose 5 % - 0.45% NaCl 1,000 mL infusion   IntraVENous CONTINUOUS    cefTRIAXone (ROCEPHIN) 1 g in sterile water (preservative free) 10 mL IV syringe  1 g IntraVENous Q24H    levothyroxine (SYNTHROID) tablet 88 mcg  88 mcg Oral ACB    multivitamin, tx-iron-ca-min (THERA-M w/ IRON) tablet 1 Tab  1 Tab Oral DAILY    simvastatin (ZOCOR) tablet 20 mg  20 mg Oral QHS    traMADol (ULTRAM) tablet 50 mg  50 mg Oral Q6H PRN    acetaminophen (TYLENOL) tablet 650 mg  650 mg Oral Q4H PRN         Physical Exam:     Visit Vitals    BP (!) 86/24 (BP 1 Location: Right arm, BP Patient Position: Supine)    Pulse (!) 38    Temp 97.6 °F (36.4 °C)    Resp 12    Ht 5' (1.524 m)    Wt 148 lb (67.1 kg)    SpO2 93%    BMI 28.9 kg/m2     BP Readings from Last 3 Encounters:   12/01/17 (!) 86/24   09/26/17 118/76   07/09/17 151/50     Pulse Readings from Last 3 Encounters:   12/01/17 (!) 38   09/26/17 90   07/09/17 (!) 56     Wt Readings from Last 3 Encounters:   11/28/17 148 lb (67.1 kg)   09/26/17 132 lb (59.9 kg)   07/09/17 142 lb (64.4 kg)       General:  alert, cooperative, no distress, appears stated age  Neck:  no carotid bruit  Lungs:  clear to auscultation bilaterally to anterolateral lung fields  Heart:  bradycardic  Abdomen:  abdomen is soft without significant tenderness, masses, organomegaly or guarding  Extremities:  edema 1-2+ to bilateral lower extremities  Skin: Erythema noted to distal bilateral lower extremities with bandages in place.   Neuro: alert, oriented x3, affect appropriate, no focal neurological deficits, moves all extremities well, no involuntary movements  Psych: non focal     Data Review:     Recent Labs      12/01/17   0410  11/28/17   1635   WBC  2.9*  3.3*   HGB  9.9*  10.6*   HCT  30.0*  31.7*   PLT  72*  98*     Recent Labs      12/01/17   0410  11/30/17   0400  11/29/17   0430  11/28/17   1635   NA  137  138  138  137   K  4.6  5.1  5.1  5.1   CL  104  107  107  106   CO2  20*  21  17*  16*   GLU  106*  95  67*  68*   BUN  122*  122*  132*  129*   CREA  4.11*  4.02*  4.02*  4.16*   CA  8.6  8.8  9.5  9.5   MG   --    --    --   2.4   PHOS  8.4*  8.3*   --    --    ALB  3.2*  3.1*  3.1*   --    --    SGOT  36   --    --    --    ALT  29   --    --    --        Results for orders placed or performed during the hospital encounter of 11/28/17   EKG, 12 LEAD, INITIAL   Result Value Ref Range    Ventricular Rate 52 BPM    Atrial Rate 227 BPM    QRS Duration 118 ms    Q-T Interval 490 ms    QTC Calculation (Bezet) 455 ms    Calculated R Axis -38 degrees    Calculated T Axis 26 degrees    Diagnosis       Atrial fibrillation with slow ventricular response  Left axis deviation  Incomplete right bundle branch block  Abnormal ECG  When compared with ECG of 09-JUL-2017 19:42,  QT has lengthened  Confirmed by Midge Dance ([id number]) on 11/29/2017 7:54:20 AM         Last Lipid:    Lab Results   Component Value Date/Time    Cholesterol, total 132 07/07/2010 10:57 AM    HDL Cholesterol 65 07/07/2010 10:57 AM    LDL, calculated 55.6 07/07/2010 10:57 AM    Triglyceride 57 07/07/2010 10:57 AM    CHOL/HDL Ratio 2.0 07/07/2010 10:57 AM       Signed By: Carlie Montesinos PA-C     December 1, 2017

## 2017-12-01 NOTE — PROGRESS NOTES
Visit Vitals    BP (!) 84/48    Pulse (!) 35    Temp 97.6 °F (36.4 °C)    Resp 16    Ht 5' (1.524 m)    Wt 67.1 kg (148 lb)    SpO2 90%    BMI 28.9 kg/m2     0525  Pt with UO of 11 mL/hr this shift. Dr Kevin Rhodes paged     5129  Pt given 250 mL bolus per order. 5003  Dr Kevin Rhodes paged to notify of pt's vitals. Unable to take temp at this time. Prev MEWS score 4. Discussed pt with Shanda Mason, ROLF  ICU, and her recommendation was to contact Kevin Rhodes to discuss concerns; awaiting return call. Patient Vitals for the past 8 hrs:   Temp Pulse Resp BP SpO2   12/01/17 0604 - (!) 33 16 (!) 88/40 96 %   12/01/17 0534 - (!) 36 - (!) 88/40 96 %   12/01/17 0523 97.6 °F (36.4 °C) (!) 35 - (!) 84/48 90 %   12/01/17 0515 - (!) 34 - 91/51 -       0645  Informed Dr Kevin Rhodes of pt's status. Dr Kevin Rhodes reports he will discuss with Dr Moon Botello when he comes in and have cardiology consult. Bedside and Verbal shift change report given to Freddie Sutton RN by Erik Guerrero RN. Report included the following information SBAR, Kardex, Intake/Output and MAR.

## 2017-12-01 NOTE — PROGRESS NOTES
Problem: Self Care Deficits Care Plan (Adult)  Goal: *Acute Goals and Plan of Care (Insert Text)  Occupational Therapy Goals  Initiated 11/30/2017 within 7 day(s). 1.  Patient will perform grooming tasks while standing with supervision for balance. 2.  Patient will perform lower body dressing with minimal assistance utilizing AE, prn.  3.  Patient will perform functional task in standing for 8 minutes with supervision for balance to increase activity tolerance for ADLs. 4.  Patient will perform toilet transfers with supervision/set-up. 5.  Patient will perform all aspects of toileting with minimal assistance. 6.  Patient will participate in upper extremity therapeutic exercise/activities with supervision/set-up for 8 minutes to increase BUE strength and activity tolerance for increased participation in ADLs. 7.  Patient will utilize energy conservation techniques during functional activities with minimal verbal cues. Outcome: Progressing Towards Goal  Occupational Therapy TREATMENT    Patient: Yolanda Boston (80 y.o. female)  Date: 12/1/2017  Diagnosis: Acute renal failure superimposed on stage 3 chronic kidney disease ([facility] [address]  Acute on chronic renal failure (HCC) Acute renal failure superimposed on stage 3 chronic kidney disease ([facility] [address]       Precautions: Fall  Chart, occupational therapy assessment, plan of care, and goals were reviewed. ASSESSMENT: Pt supine on arrival, son at bedside. Pt agreeable to therapy. Min A for bed mobility; fair+/fair sitting balance. BLE edema appears to have decreased slightly since eval on 11/30/2017. Mod A & additional time to stand from EOB with RW. Dynamic standing activity with CGA in prep for item retrieval during ADLs. Functional transfer to recliner chair in prep for bathroom mobility; skilled instruction for transfer strategies to ensure safety.  In chair, BUE TherEx x10 each to increase strength, AROM & activity tolerance for increased strength & independence in transfers & ADLs. Brief rest breaks required btw each set. Pt left up in chair, needs within reach, son present. No complaints. Anel Tanner, RN aware of session. EDUCATION: Pt/son educated on continued OT POC, safety during functional transfers, and benefits of sitting up in chair. They verbalized understanding. Progression toward goals:  [x]          Improving appropriately and progressing toward goals  []          Improving slowly and progressing toward goals  []          Not making progress toward goals and plan of care will be adjusted     PLAN:  Patient continues to benefit from skilled intervention to address the above impairments. Continue treatment per established plan of care. Discharge Recommendations:  Ricardo Schulte  Further Equipment Recommendations for Discharge:  shower chair     SUBJECTIVE:   Patient stated \"Will I see you again? \"    OBJECTIVE DATA SUMMARY:     G CODES:  Self Care  Current  CK= 40-59%  [id number] Goal  CJ= 20-39%. The severity rating is based on the Other Functional Assessment, MMT, ROM    Cognitive/Behavioral Status:  Neurologic State: Alert  Orientation Level: Oriented X4  Cognition: Appropriate for age attention/concentration, Appropriate decision making, Appropriate safety awareness, Follows commands  Safety/Judgement: Awareness of environment, Fall prevention  Functional Mobility and Transfers for ADLs:   Bed Mobility:  Supine to Sit: Minimum assistance; Additional time  Sit to Supine:  (pt left up in chair)      Transfers:  Sit to Stand: Moderate assistance; Additional time;Assist x1    Balance:  Sitting: Impaired  Sitting - Static: Fair (occasional) (Fair+)  Sitting - Dynamic: Fair (occasional)  Standing: Impaired; With support  Standing - Static: Fair  Standing - Dynamic : Fair    Cognitive Retraining  Safety/Judgement: Awareness of environment; Fall prevention    Therapeutic Activity:  Dynamic standing activity with CGA for balance and RW for stability with fair balance. Skilled instruction on trunk and BUE postioning to ensure safety in prep for item retrieval during ADLs such as grooming at sink. Functional transfer to recliner chair in prep for bathroom mobility & toilet transfer; skilled instruction for sequencing and transfer strategies to ensure safety. Therapeutic Exercises:   While sitting up in chair, skilled instruction & supervision for BUE TherEx (shoulder flex, bicep curls, and \"rows\") x10 each to increase strength, AROM, and actiivty tolerance for increased independence in ADLs. Brief rest breaks required btw each set. Pain:  Pre Treatment: 0/10  Post Treatment: 0/10  Pain Scale 1: Numeric (0 - 10)    Activity Tolerance:   Fair  Please refer to the flowsheet for vital signs taken during this treatment.   After treatment:   [x]  Patient left in no apparent distress sitting up in chair  []  Patient left in no apparent distress in bed  [x]  Call bell left within reach  [x]  Nursing notified  [x]  Caregiver present  []  Bed alarm activated  MS MARY ANN Fletcher/TOM  Time Calculation: 29 mins

## 2017-12-01 NOTE — PROGRESS NOTES
Problem: Falls - Risk of  Goal: *Absence of Falls  Document Cornell Fall Risk and appropriate interventions in the flowsheet.    Outcome: Progressing Towards Goal  Fall Risk Interventions:  Mobility Interventions: Patient to call before getting OOB         Medication Interventions: Evaluate medications/consider consulting pharmacy, Patient to call before getting OOB, Teach patient to arise slowly    Elimination Interventions: Patient to call for help with toileting needs, Toilet paper/wipes in reach, Toileting schedule/hourly rounds

## 2017-12-01 NOTE — PROGRESS NOTES
Assumed care of pt from night nurse ROLF Johnson. Received with patient in bed, A&O x 4. Patient denies pain and/or discomfort. Call light in reach. Encouraged to call for assistance, Pt verbalized understanding. 0900 -  Patient sitting up in chair, blood pressure improved some, 500 mL bolus normal saline administered per order. 0945 - Bolus completed. 1000 - Patient assisted back to bed. Blood pressure decreased as well as HR, physician aware. Patient placed on cardiac monitoring.

## 2017-12-01 NOTE — PROGRESS NOTES
Problem: Falls - Risk of  Goal: *Absence of Falls  Document Cornell Fall Risk and appropriate interventions in the flowsheet.    Outcome: Progressing Towards Goal  Fall Risk Interventions:  Mobility Interventions: Patient to call before getting OOB         Medication Interventions: Patient to call before getting OOB, Teach patient to arise slowly    Elimination Interventions: Call light in reach, Patient to call for help with toileting needs, Toileting schedule/hourly rounds

## 2017-12-01 NOTE — PROGRESS NOTES
Problem: General Medical Care Plan    Goal: *Vital signs within specified parameters  Outcome: Progressing Towards Goal  Patient Vitals for the past 8 hrs:   Temp Pulse Resp BP SpO2   11/30/17 2220 95 °F (35 °C) (!) 41 16 108/74 95 %       Goal: *Optimal pain control at patient's stated goal  Outcome: Progressing Towards Goal  Pt reports pain well controlled    Goal: *Skin integrity maintained  Outcome: Progressing Towards Goal  Dressings changed today per report    Goal: *Optimize nutritional status  Outcome: Progressing Towards Goal  Pt with nausea/vomiting earlier in shift; denies at this time    Problem: Falls - Risk of    Goal: *Absence of Falls  Document Cornell Fall Risk and appropriate interventions in the flowsheet.    Outcome: Progressing Towards Goal  Fall Risk Interventions:  Mobility Interventions: Patient to call before getting OOB    Medication Interventions: Patient to call before getting OOB, Teach patient to arise slowly    Elimination Interventions: Call light in reach, Patient to call for help with toileting needs, Toileting schedule/hourly rounds

## 2017-12-01 NOTE — ROUTINE PROCESS
1415: arrived with Harjinder Valadez, [address] [facility]: O2 sats 88 sustained, placed NC at 3L. Pt brought home CPAP machine, called [employer] to have it checked, stated they will be here tonight, not sure of time. Ticket number [phone]    4815: Dopamine is maxed yet BP has been dropping. Dr. Lashawn Leija paged. 1910: Dr. Katherine Burch paged. Stated that since he has not been consulted he cannot advise pt care. 1918: Dr. Ayesha Alfaro paged. 1940: Dr. Ayesha Alfaro paged again. Called back I gave him a brief SBAR on the pt and asked that he consult intensivist. He called back and asked why I had not called cardiology as they have been consulted. I stated that there was not an order listed. He stated that Dr. Burnetta Goltz had entered a note. I stated I would page the cardiologist on call. 1950: Marit Curling called, telephone orders for atropine and she requested a call back for status update after atropine given. Bedside shift change report given to Femi Fitzpatrick, RN (oncoming nurse) by Silverio Wilkerson RN (offgoing nurse). Report included the following information SBAR, Kardex, ED Summary, Procedure Summary, Intake/Output, MAR, Accordion, Recent Results and Med Rec Status. 2020: Dr. Burnetta Goltz called to ask about pt status, no new orders at this time.

## 2017-12-01 NOTE — PROGRESS NOTES
Internal Medicine Progress Note    Patient's Name: Akiko Crowe  Admit Date: 11/28/2017  Length of Stay: 2      Assessment/Plan     Active Hospital Problems    Diagnosis Date Noted    Pancytopenia ([facility] [address] 12/01/2017    Acute on chronic renal failure ([facility] [address] 11/29/2017    UTI (urinary tract infection) 11/29/2017    Acute renal failure superimposed on stage 3 chronic kidney disease ([facility] [address] 11/28/2017    Chronic atrial fibrillation ([facility] [address] 11/28/2017    Gout 11/28/2017    Chronic venous stasis dermatitis 11/28/2017    Diarrhea 11/28/2017     - BP remains hypotension  - HR persistently in low 30s   - D/w cardiology, will start dopamine gtt and cont to monitor off dig, CCB, BB  - Hold eliquis given bleeding  - Very minimal urine output despite bolus and IVFs with no improvement in renal function  - Pt may need dialysis, but given age she may be poor candidate  - t/c palliative care  - F/u nephro  - Trend BMP  - Pancytopenia noted, may be 2/2 infection, will clinically monitor  - Cont rocephin  - Transfer to stepdown  - PT/OT  - DVT protocol    I have personally reviewed all pertinent labs and films that have officially resulted over the last 24 hours. I have personally checked for all pending labs that are awaiting final results.     Subjective     Pt s/e @ bedside  No major events overnight  Started having some bleeding again around urethra  Still feeling fatigued  Denies lightheadedness    Objective     Visit Vitals    BP (!) 87/30 (BP 1 Location: Right arm, BP Patient Position: At rest)    Pulse (!) 35    Temp 97.6 °F (36.4 °C)    Resp 10    Ht 5' (1.524 m)    Wt 67.1 kg (148 lb)    SpO2 96%    BMI 28.9 kg/m2       Physical Exam:  General Appearance: NAD, conversant  Lungs: CTA with normal respiratory effort  CV: IRIR, no m/r/g  Abdomen: soft, non-tender, normal bowel sounds  Extremities: no cyanosis, B/L +2 LE pitting edema, stasis dermatitis  : + visible blood around carrizales exit at urethra  Neuro: No focal deficits, motor/sensory intact    Lab/Data Reviewed:  BMP:   Lab Results   Component Value Date/Time     12/01/2017 04:10 AM    K 4.6 12/01/2017 04:10 AM     12/01/2017 04:10 AM    CO2 20 (L) 12/01/2017 04:10 AM    AGAP 13 12/01/2017 04:10 AM     (H) 12/01/2017 04:10 AM     (H) 12/01/2017 04:10 AM    CREA 4.11 (H) 12/01/2017 04:10 AM    GFRAA 12 (L) 12/01/2017 04:10 AM    GFRNA 10 (L) 12/01/2017 04:10 AM     CBC:   Lab Results   Component Value Date/Time    WBC 2.9 (L) 12/01/2017 04:10 AM    HGB 9.9 (L) 12/01/2017 04:10 AM    HCT 30.0 (L) 12/01/2017 04:10 AM    PLT 72 (L) 12/01/2017 04:10 AM       Imaging Reviewed:  No results found.     Medications Reviewed:  Current Facility-Administered Medications   Medication Dose Route Frequency    melatonin tablet 1.5 mg  1.5 mg Oral QHS    ondansetron (ZOFRAN) injection 4 mg  4 mg IntraVENous Q6H PRN    apixaban (ELIQUIS) tablet 2.5 mg  2.5 mg Oral BID    sodium bicarbonate (8.4%) 75 mEq in dextrose 5 % - 0.45% NaCl 1,000 mL infusion   IntraVENous CONTINUOUS    cefTRIAXone (ROCEPHIN) 1 g in sterile water (preservative free) 10 mL IV syringe  1 g IntraVENous Q24H    febuxostat (ULORIC) tablet 40 mg  40 mg Oral DAILY    levothyroxine (SYNTHROID) tablet 88 mcg  88 mcg Oral ACB    multivitamin, tx-iron-ca-min (THERA-M w/ IRON) tablet 1 Tab  1 Tab Oral DAILY    simvastatin (ZOCOR) tablet 20 mg  20 mg Oral QHS    traMADol (ULTRAM) tablet 50 mg  50 mg Oral Q6H PRN    acetaminophen (TYLENOL) tablet 650 mg  650 mg Oral Q4H PRN           Rupesh Patterson DO  Internal Medicine, Hospitalist  Pager: [phone]  [address] [place] [address]

## 2017-12-01 NOTE — PROGRESS NOTES
RENAL PROGRESS NOTE        June Braun         Assessment/Plan:   · SEJAL (volume depletion/ischemic atn). UO is marginal, scr is up since yesterday as expected given hypotension/bradycardia. May need acute dialysis. D/w patient and son, they will thinks about it. · CKD 3 presumably due to htn and age related decrease in gfr. Suspect chronic use of diuretic to address chronic le edema also contributes to chronically elevated bun/scr. · Met acidosis of sejal (predominanly normal ag). Improving, continue ivf with [place]. · Hypotension/a.fib with slow ventricular response. Agree with plans for icu transfer/dopamin. · UTI, on abx. · Pancytopenia. Plt are decreasing. Off note had mild thrombocytopenia in the past. Will defer w/u to primary team.                                                                                                                                   Subjective:  Patient complaints off: Feels week. No SOB at rest, no CP/N/V. Appetite is poor. Patient Active Problem List   Diagnosis Code    Wound dehiscence, traumatic injury repair T81.33XA    Traumatic open wound of right lower leg with infection S81.801A, L08.9    Cellulitis of left lower extremity L03. 116    Pneumonia J18.9    Sepsis (HCC) A41.9    Cellulitis of leg, left L03. 116    Cellulitis of right leg L03.115    Acute renal failure superimposed on stage 3 chronic kidney disease (HCC) N17.9, N18.3    Chronic atrial fibrillation (HCC) I48.2    Gout M10.9    Chronic venous stasis dermatitis I87.2    Diarrhea R19.7    Acute on chronic renal failure (HCC) N17.9, N18.9    UTI (urinary tract infection) N39.0    Pancytopenia (HCC) D61.818       Current Facility-Administered Medications   Medication Dose Route Frequency Provider Last Rate Last Dose    melatonin tablet 1.5 mg  1.5 mg Oral QHS Any Kathleen, DO        DOPamine in 5 % dextrose (INTROPIN) 800 mg/500 mL (1,600 mcg/mL) infusion  0-20 mcg/kg/min IntraVENous TITRATE Denise Bass MD        ondansetron [facility]) injection 4 mg  4 mg IntraVENous Q6H PRN Edgard Watt MD   4 mg at 11/30/17 2057    sodium bicarbonate (8.4%) 75 mEq in dextrose 5 % - 0.45% NaCl 1,000 mL infusion   IntraVENous CONTINUOUS Nagi Wagner MD 75 mL/hr at 12/01/17 0155      cefTRIAXone (ROCEPHIN) 1 g in sterile water (preservative free) 10 mL IV syringe  1 g IntraVENous Q24H Delcambre Mangle, DO   1 g at 12/01/17 1436    levothyroxine (SYNTHROID) tablet 88 mcg  88 mcg Oral ACB Delcambre Mangle, DO   88 mcg at 12/01/17 1144    multivitamin, tx-iron-ca-min (THERA-M w/ IRON) tablet 1 Tab  1 Tab Oral DAILY Delcambre Mangle, DO   1 Tab at 12/01/17 0902    simvastatin (ZOCOR) tablet 20 mg  20 mg Oral QHS Delcambre Mangle, DO   20 mg at 11/30/17 2227    traMADol (ULTRAM) tablet 50 mg  50 mg Oral Q6H PRN Delcambre Mangle, DO   50 mg at 12/01/17 0910    acetaminophen (TYLENOL) tablet 650 mg  650 mg Oral Q4H PRN Delcambre Mangle, DO   650 mg at 11/30/17 2101       Objective  Vitals:    12/01/17 0900 12/01/17 0952 12/01/17 1415 12/01/17 1432   BP: 104/65 (!) 87/30 (!) 79/40 (!) 86/24   Pulse: 65 (!) 35 (!) 35 (!) 38   Resp: 16 10 10 12   Temp:       SpO2: 95% 96% 93% 93%   Weight:       Height:             Intake/Output Summary (Last 24 hours) at 12/01/17 1509  Last data filed at 12/01/17 1421   Gross per 24 hour   Intake          2251.25 ml   Output              400 ml   Net          1851.25 ml           Admission weight: Weight: 67.1 kg (148 lb) (11/28/17 1533)  Last Weight Metrics:  Weight Loss Metrics 11/28/2017 9/26/2017 7/9/2017 5/1/2017 11/26/2016   Today's Wt 148 lb 132 lb 142 lb 145 lb 154 lb   BMI 28.9 kg/m2 26.66 kg/m2 28.68 kg/m2 29.29 kg/m2 31.1 kg/m2             Physical Assessment:     General: NAD, alert and oriented. Neck: No jvd. LUNGS: diminished air entry at the bases, bl exp rhonchi. CVS EXM: S1, S2, irregular. Abdomen: soft, non tender.    Lower Extremities:  1+ edema. Lt pretibial dressings intact. Lab    CBC w/Diff Recent Labs      12/01/17   0410  11/28/17   1635   WBC  2.9*  3.3*   RBC  3.27*  3.44*   HGB  9.9*  10.6*   HCT  30.0*  31.7*   PLT  72*  98*   GRANS  68  59   LYMPH  12*  15*   EOS  13*  14*        Chemistry Recent Labs      12/01/17   0410  11/30/17   0400   11/29/17   0430   GLU  106*  95   --   67*   NA  137  138   --   138   K  4.6  5.1   --   5.1   CL  104  107   --   107   CO2  20*  21   --   17*   BUN  122*  122*   --   132*   CREA  4.11*  4.02*   --   4.02*   CA  8.6  8.8   --   9.5   AGAP  13  10   --   14   BUCR  30*  30*   --   33*   AP  172*   --    --    --    TP  5.9*   --    --    --    ALB  3.2*  3.1*  3.1*   --    --    GLOB  2.7   --    --    --    AGRAT  1.2   --    --    --    PHOS  8.4*  8.3*   < >   --     < > = values in this interval not displayed. No results found for: IRON, FE, TIBC, IBCT, PSAT, FERR Lab Results   Component Value Date/Time    Calcium 8.6 12/01/2017 04:10 AM    Phosphorus 8.4 12/01/2017 04:10 AM        Leif Jones M.D.   [facility]  Phone

## 2017-12-01 NOTE — INTERDISCIPLINARY ROUNDS
IDR Summary      Patient: Tessy Singh MRN: [id number]    Age: 80 y.o.  : 1925     Admit Diagnosis: Acute renal failure superimposed on stage 3 chronic kidney disease ([facility] [address]  Acute on chronic renal failure (HCC)      Problems pertinent to hospital stay: Bradycardia     Consults:P.T, O.T. and Case Management     Testing due for patient today? YES    Nutrition plan:Yes     Mobility needs: Yes      Lines/Tubes:   IV: YES   Needed: YES  Varela: YES  Needed:YES  Central Line: NO Needed: NO      VTE Prophylaxis: Not needed    Influenza Vaccine received?  YES        Care Management:  Discharge plan: SNF   PCP: Ya Good MD    : NO  Financial concerns:No   Interventions:       LOS: 2 days     Expected days until discharge: 3+ days        Signed:     JULIETA Krishnamurthy  [address]  Hospitalist Division  Pager:  [phone]  Office:  [phone]

## 2017-12-01 NOTE — PROGRESS NOTES
Attempted to complete evaluation patient's family member reported patient about to have ekg done and requested to hold at this time. Attempted at [phone] and nurse, Marily Cheung RN asked to hold due to low heart rate and blood pressure will check back tomorrow.

## 2017-12-01 NOTE — PROGRESS NOTES
Pt agrees to snf. Son in room. [facility] facility [facility], she has been there before. Posted in epic. Adm date is 11/29 as pt was changed to inpt.

## 2017-12-01 NOTE — PROGRESS NOTES
2018: Assumed pt care. Received pt resting in bed, alert and oriented x 4. Pt is nauseous and has generalized pain. With carrizales catheter, intact,draining. Family at bedside. Call bell within reach. 2057: Zofran 4 mg IV given for nausea. 2101: Tylenol given for generalized pain. 2200: patient sleeping.     2320: Bedside and Verbal shift change report given to Darshan Alvarado (oncoming nurse) by Ismael Martinez   (offgoing nurse). Report included the following information SBAR, Kardex, Intake/Output and MAR.

## 2017-12-02 NOTE — PROGRESS NOTES
conducted a Follow up consultation and Spiritual Assessment for Yolanda Camacho, who is a 80 y.o.,female. The  provided the following Interventions:  Continued the relationship of care and support. Listened empathically. Offered prayer and assurance of continued prayer on patients behalf. Chart reviewed. The following outcomes were achieved:  Patient expressed gratitude for 's visit. Assessment:  There are no spiritual or Scientologist issues which require intervention at this time. Plan:  Chaplains will continue to follow and will provide pastoral care on an as needed/requested basis.  recommends bedside caregivers page  on duty if patient shows signs of acute spiritual or emotional distress. ROSI GonzalezDiv.   [facility]  [phone]

## 2017-12-02 NOTE — CONSULTS
[facility]  Name: Yoly Godwin   : 1925   MRN: [id number]   Date: 2017    []I have reviewed the flowsheet and previous days notes. []The patient is unable to give any meaningful history or review of systems because the patient is:  []Intubated []Sedated   []Unresponsive      []The patient is critically ill on      []Mechanical ventilation []Pressors   []BiPAP []   S: None              ROS: Unable to obtain. Events and notes from last 24 hours reviewed. Care plan discussed on multidisciplinary rounds. Vital Signs:    Visit Vitals    BP (!) 78/33    Pulse 63    Temp (!) 94 °F (34.4 °C)    Resp 16    Ht 5' (1.524 m)    Wt 67.1 kg (148 lb)    SpO2 97%    BMI 28.9 kg/m2       O2 Device: Nasal cannula   O2 Flow Rate (L/min): 3 l/min   Temp (24hrs), Av.5 °F (34.7 °C), Min:94 °F (34.4 °C), Max:96 °F (35.6 °C)       Intake/Output:   Last shift:      1901 -  07  In: 1983.2 [I.V.:1983.2]  Out: -   Last 3 shifts: 701 - 1900  In: 3436.1 [P.O.:900; I.V.:2536.1]  Out: 400 [Urine:400]    Intake/Output Summary (Last 24 hours) at 17 0574  Last data filed at 17 0500   Gross per 24 hour   Intake          3877.98 ml   Output                0 ml   Net          3877.98 ml     Hemodynamics:       :      Ventilator Settings:                Physical Exam:    General: in no apparent distress   HEENT: Normal   Neck: No abnormally enlarged lymph nodes.    Chest: increased AP diameter   Lungs: decreased air exchange bibasilar and bilaterally, distant lung sounds and prolonged expiration  no dullness/ tenderness/ rash   Heart: Regular rate and rhythm or S1S2 present   Abdomen: non distended, bowel sounds normoactive, tympanic, abdomen is soft without significant tenderness, masses, organomegaly or guarding   Extremity: Trace edema   Neuro: lethargic   Skin: Skin color, texture, turgor normal. No rashes or lesions      DATA:   Current Facility-Administered Medications   Medication Dose Route Frequency    morphine (PF)  mg/30 ml   IntraVENous TITRATE    PHENYLephrine (MEAGHAN-SYNEPHRINE) 10 mg/mL injection        PHENYLephrine (MEAGHAN-SYNEPHRINE) 10 mg/mL injection        melatonin tablet 1.5 mg  1.5 mg Oral QHS    DOPamine (INTROPIN) 1,600 mcg/mL infusion (STANDARD)  0-20 mcg/kg/min IntraVENous TITRATE    DOBUTamine (DOBUTREX) 500 mg/250 mL (2,000 mcg/mL) infusion  0-10 mcg/kg/min IntraVENous TITRATE    PHENYLephrine (MEAGHAN-SYNEPHRINE) 90 mg in 0.9% sodium chloride 250 mL infusion (premix or compounded)  0-300 mcg/min IntraVENous TITRATE    ondansetron (ZOFRAN) injection 4 mg  4 mg IntraVENous Q6H PRN    sodium bicarbonate (8.4%) 75 mEq in dextrose 5 % - 0.45% NaCl 1,000 mL infusion   IntraVENous CONTINUOUS    cefTRIAXone (ROCEPHIN) 1 g in sterile water (preservative free) 10 mL IV syringe  1 g IntraVENous Q24H    levothyroxine (SYNTHROID) tablet 88 mcg  88 mcg Oral ACB    multivitamin, tx-iron-ca-min (THERA-M w/ IRON) tablet 1 Tab  1 Tab Oral DAILY    simvastatin (ZOCOR) tablet 20 mg  20 mg Oral QHS    traMADol (ULTRAM) tablet 50 mg  50 mg Oral Q6H PRN    acetaminophen (TYLENOL) tablet 650 mg  650 mg Oral Q4H PRN       Telemetry: []Sinus []A-flutter []Paced    [x]A-fib []Multiple PVCs                  Labs:  Recent Labs      12/01/17   0410   WBC  2.9*   HGB  9.9*   HCT  30.0*   PLT  72*     Recent Labs      12/01/17   0410  11/30/17   0400   NA  137  138   K  4.6  5.1   CL  104  107   CO2  20*  21   GLU  106*  95   BUN  122*  122*   CREA  4.11*  4.02*   CA  8.6  8.8   PHOS  8.4*  8.3*   ALB  3.2*  3.1*  3.1*   SGOT  36   --    ALT  29   --      No results for input(s): PH, PCO2, PO2, HCO3, FIO2 in the last 72 hours.     Imaging:  [x]I have personally reviewed the patients radiographs  []Radiographs reviewed with radiologist   [x] No CXR study available for review today   []No change from prior, tubes and lines in adequate position  []Improved []Worsening       IMPRESSION:   Patient Active Problem List   Diagnosis Code    Wound dehiscence, traumatic injury repair T81.33XA    Traumatic open wound of right lower leg with infection S81.801A, L08.9    Cellulitis of left lower extremity L03. 116    Pneumonia J18.9    Sepsis ([facility]) A41.9    Cellulitis of leg, left L03. 116    Cellulitis of right leg L03.115    Acute renal failure superimposed on stage 3 chronic kidney disease (HCC) N17.9, N18.3    Chronic atrial fibrillation (HCC) I48.2    Gout M10.9    Chronic venous stasis dermatitis I87.2    Diarrhea R19.7    Acute on chronic renal failure (HCC) N17.9, N18.9    UTI (urinary tract infection) N39.0    Pancytopenia ([facility]) D61.818     · A Fib: Seen by Cardiology already  · Bradycardia: seen by Cardiology already  · SEJAL on CKD III: seen by Nephrology already  · Arterial Hypotension: Volume depletion (poor intake, Diuretic, ARB. Diarrhea)  · Poor response to Dopamine 20 ug/kg/min + Dobutamine 30 ug/kg/min  · Pancytopenia: On Multiple ATBx  · Advanced age  · Code status NOT addressed          PLAN:   · DC ALL blood work  · DC all X rays  · Patient's family decline NE and or an IO/CVP/PICC or any invasive diagnostic or therapeutic intervention  · Palliative/ comfort care     The patient is: [x] acutely ill Risk of deterioration: [] moderate    [] critically ill  [x] high     [x]See my orders for details    My assessment, plan of care, findings, medications, side effects etc were discussed with:  [x]nursing []PT/OT    []respiratory therapy [x]Dr Emmanuel Cardona: His inaccurate note read. The daytime ICU RN, Dejah Eid / paged because Dr Emmanuel Cardona was NOT answering his page. Charge nurse aware that hospirtalist did not answer his multiple pages. When on the phone with him we had a very bad line and he and I had difficulty hearing each other. I asked if the patient needed a procedure: he did not know.  We lost the line, I tried to call him to ext [phone] several times with NO success. He obviously does not know ALL my multiple interventions with this patient and her family. I have a very good relationship with this patient's family and they are happy with my caring for her.      [x]family: 2 daughters this AM/ I son and son in law last night. []     [x]Total critical care time exclusive of procedures 30  minutes  Eliezer Mon MD

## 2017-12-02 NOTE — DISCHARGE SUMMARY
Internal Medicine Discharge Summary        Patient: Yolanda Camacho    YOB: 1925    Age:  80 y.o.                       Admit Date: 2017    Discharge Date: 2017    LOS:  LOS: 3 days     Discharge To: Patient     Consults: Cardiology, Nephrology and Pulmonary/Critical Care    Admission Diagnoses: Acute renal failure superimposed on stage 3 chronic kidney disease ([facility]  Acute on chronic renal failure ([facility]    Discharge Diagnoses:    Problem List as of 2017  Never Reviewed          Codes Class Noted - Resolved    Pancytopenia ([facility] ICD-10-CM: U31.173  ICD-9-CM: 284.19  2017 - Present        Acute on chronic renal failure ([facility] ICD-10-CM: N17.9, N18.9  ICD-9-CM: 584.9, 585.9  2017 - Present        UTI (urinary tract infection) ICD-10-CM: N39.0  ICD-9-CM: 599.0  2017 - Present        * (Principal)Acute renal failure superimposed on stage 3 chronic kidney disease ([facility] ICD-10-CM: N17.9, N18.3  ICD-9-CM: 584.9, 585.3  2017 - Present        Chronic atrial fibrillation ([facility] ICD-10-CM: I48.2  ICD-9-CM: 427.31  2017 - Present        Gout ICD-10-CM: M10.9  ICD-9-CM: 274.9  2017 - Present        Chronic venous stasis dermatitis ICD-10-CM: I87.2  ICD-9-CM: 454.1  2017 - Present        Diarrhea ICD-10-CM: R19.7  ICD-9-CM: 787.91  2017 - Present        Cellulitis of right leg ICD-10-CM: L03.115  ICD-9-CM: 682.6  2017 - Present        Pneumonia ICD-10-CM: J18.9  ICD-9-CM: 819  2016 - Present        Sepsis ([facility] ICD-10-CM: A41.9  ICD-9-CM: 038.9, 995.91  2016 - Present        Cellulitis of leg, left ICD-10-CM: L03.116  ICD-9-CM: 682.6  2016 - Present        Cellulitis of left lower extremity ICD-10-CM: L03.116  ICD-9-CM: 682.6  2016 - Present        Wound dehiscence, traumatic injury repair ICD-10-CM: T81.33XA  ICD-9-CM: 998.33  10/28/2016 - Present        Traumatic open wound of right lower leg with infection ICD-10-CM: F40.247H, L08.9  ICD-9-CM: 891.1  10/28/2016 - Present              Discharge Condition:  Improved    Procedures: None         HPI: Yolanda Boston is a 80 y.o. female with a PMHx of Afib, venous stasis disease, gout, ckd III who presented to the ED with 2-3 week history of fatigue, weakness, poor appetite and weight gain. She was sent here from her  [id number] office after regular check up because of abnormal labs. She admits to persistent diarrhea as well for the past 2 weeks. She states she was placed on an antibx by per PCP for lower ext cellulitis about 3 weeks ago, but can't remember which one and was on it for 2 weeks, finishing up last week. She states the diarrhea began while she was on the antibx. The diarrhea is not voluminous. It is smelly per daughter. It occurs throughout the day and sometimes she can't make it to the bathroom. She denies a history of cdiff. She states increased edema in her legs, leading her PCP to increase her daily lasix. She denies a history of heart failure. Her PCP has been managing her lower ext edema and blisters with local wound care. She admits to over a 15lb weight gain in the past several weeks despite a decreased appetite. She states she feels dehydrated. In the ED, she was given one small 250cc bolus of IVFs after being found to be in acute renal failure. Hospital Course (Including Significant Findings): The patient was admitted to the hospital for further management of their presenting condition. She was treated for UTI with Rocephin. She was continued on sodium bicarb IVFs per nephrology for her renal failure, but, unfortunately, this did not improve her kidney function. Her heart rate became more bradycardic and her blood pressure very labile. Cardiology was consulted and recommended dopamine and transfer to the ICU. Her blood pressure continued to deteriorate and family decided on comfort care status and DNR.  She was placed on morphine PCA and all pressors were discontinued. She passed peacefully shortly after with family at the bedside. Visit Vitals    BP (!) 76/34    Pulse (!) 56    Temp 95.6 °F (35.3 °C)    Resp 16    Ht 5' (1.524 m)    Wt 80.3 kg (177 lb)    SpO2 (!) 87%    BMI 34.57 kg/m2       Labs Prior to Discharge:  Labs: Results:       Chemistry Recent Labs      12/01/17 0410 11/30/17 0400   GLU  106*  95   NA  137  138   K  4.6  5.1   CL  104  107   CO2  20*  21   BUN  122*  122*   CREA  4.11*  4.02*   CA  8.6  8.8   AGAP  13  10   BUCR  30*  30*   AP  172*   --    TP  5.9*   --    ALB  3.2*  3.1*  3.1*   GLOB  2.7   --    AGRAT  1.2   --       CBC w/Diff Recent Labs      12/01/17 0410   WBC  2.9*   RBC  3.27*   HGB  9.9*   HCT  30.0*   PLT  72*   GRANS  68   LYMPH  12*   EOS  13*      Cardiac Enzymes No results for input(s): CPK, CKND1, CRISTINA in the last 72 hours. No lab exists for component: CKRMB, TROIP   Coagulation No results for input(s): PTP, INR, APTT in the last 72 hours. No lab exists for component: INREXT    Lipid Panel Lab Results   Component Value Date/Time    Cholesterol, total 132 07/07/2010 10:57 AM    HDL Cholesterol 65 07/07/2010 10:57 AM    LDL, calculated 55.6 07/07/2010 10:57 AM    VLDL, calculated 11.4 07/07/2010 10:57 AM    Triglyceride 57 07/07/2010 10:57 AM    CHOL/HDL Ratio 2.0 07/07/2010 10:57 AM      BNP No results for input(s): BNPP in the last 72 hours. Liver Enzymes Recent Labs      12/01/17 0410   TP  5.9*   ALB  3.2*  3.1*   AP  172*   SGOT  36      Thyroid Studies Lab Results   Component Value Date/Time    TSH 9.98 11/28/2017 04:35 PM            Significant Imaging:  [address]    Result Date: 11/29/2017  EXAM: Limited retroperitoneal ultrasound INDICATION: Acute on chronic renal failure. COMPARISON: None. _______________ FINDINGS: RIGHT KIDNEY: 10.0 cm in length. Multiple cysts are identified. Largest 2 are measured. In the upper pole there is a septated cyst measuring 3.3 x 3.7 x 2.6 cm. In the lower pole there is a simple cyst measuring 1.3 x 1.6 x 1.4 cm. . No visible calculi. No hydronephrosis. Mildly increased cortical echogenicity. LEFT KIDNEY: 9.8 cm in length. Multiple small cysts are again identified. Largest is in the lower pole measuring 1.3 x 1.0 x 1.1 cm. . No calculi or hydronephrosis. Increased cortical echogenicity. OTHER: Incidental note is made of small bilateral pleural effusions and trace ascites in the right and left upper quadrant. _______________     IMPRESSION: 1. Increased renal cortical echogenicity bilaterally consistent with medical renal disease. 2. Multiple cysts bilaterally. There is a septated cyst in the upper pole of the right kidney. Other cysts appear to be simple cysts. 3. Small bilateral pleural effusions. 4. Trace ascites.             Patient          Total time spent including time spent on final examination anddischarge documentation and records reviewed: > 30 minutes    Lieutenant Simeon DO  Internal Medicine, Hospitalist  Pager: [phone]

## 2017-12-02 NOTE — PROGRESS NOTES
Levothyroxine PO to IV Interchange    Patient is on oral levothyroxine 88 mcg daily but also NPO. Per P&T policy, orders for IV levothyroxine for continuation of home therapy will be delayed 3 hospital days. If patient can take oral medication before hospital day 3, pharmacy will automatically convert the patient to oral therapy. If IV begins on day 3, pharmacy will monitor to convert the patient to oral as an automatic interchange as soon as the patient is taking oral medication.     Thanks,  Karlos Knowles, PHARMD

## 2017-12-02 NOTE — PROGRESS NOTES
responded to Death of  Yolanda Camacho, who was a 80 y.o.,female,     The  provided the following Interventions for Patient Family:  Provided crisis pastoral care, pastoral support and grief interventions. Offered prayers on behalf of the patient family. Chart reviewed. Plan:  Chaplains will continue to follow and will provide pastoral care on an as needed/requested basis and grief support for the family. Octavio Rasmussen M.Div.   [facility]  [phone]

## 2017-12-02 NOTE — PROGRESS NOTES
PHYSICAL THERAPY NOTE    8:51am  PT Order noted. Chart reviewed. Patient with decreased BP and HR. Spoke with patient's nurse. Per nurse, hold PT.    16:42pm Patient noted to be . Dawna Springer.  Isrrael Teague

## 2017-12-02 NOTE — ROUTINE PROCESS
0700: assumed care of pt from Yvan Toledo RN. Dobutamine, dopamine and phenylephrine gtt running. Gtt verified with off going RN. Family members in room with pt. Will continue to monitor. 1130: approached by pt's daughter that the family would like to d/c all gtt and make pt comfort care. Paged Dr. Jolene Lomeli    1200: spoke with Dr. Jolene Lomeli order to d/c gtt and make pt comfort care. 1215: order obtained from Dr Jolene Lomeli to titrate morphine and for ativan 1mg q1hr PRN. Family at bedside. 1415: Dr. Jolene Lomeli at bedside. [facility] notified of [facility]. Malena Aviles, pt's daughter in regards to left pillow, daughter stated that they did not want to keep pillow from home.

## 2017-12-02 NOTE — CONSULTS
[facility] - Consult Note    Consultation request by Dr. Franny Rios for advice/opinion related to evaluating bradycardia    Date of  Admission: 11/28/2017  3:34 PM   Primary Care Physician:  Lupe Hartman MD      Subjective:  Resting comfortably     Assessment:     -Acute renal failure, Cr stable ~4, most recent Cr otherwise prior to admission 1.92  -Bradycardia  -Hx atrial fibrillation, was on chronic therapy with Metoprolol, Cardizem and Digoxin, all of which have been stopped this admission  -UTI, UCx 11/28/17 positive for Klebsiella Pneumoniae, on Rocephin  -Recent treatment for bilateral lower extremity cellultis  -Hx sleep apnea     Pt does not currently have a cardiologist.     Plan:     - Overnight event noted  Patient made DNR and comfort care  On Dopamin, dobutamine and loi for BP support and HR support Unlikely to change outcome  IV morphine drip has been started. DW two daughters at bedside. They want her to be comfortable and no further invasive work up or management per family request.  Will be available as needed. Please call us back.       Physical Exam:     Visit Vitals    BP (!) 70/31    Pulse 60    Temp 95.6 °F (35.3 °C)    Resp 14    Ht 5' (1.524 m)    Wt 177 lb (80.3 kg)    SpO2 91%    BMI 34.57 kg/m2     BP Readings from Last 3 Encounters:   12/02/17 (!) 70/31   09/26/17 118/76   07/09/17 151/50     Pulse Readings from Last 3 Encounters:   12/02/17 60   09/26/17 90   07/09/17 (!) 56     Wt Readings from Last 3 Encounters:   12/02/17 177 lb (80.3 kg)   09/26/17 132 lb (59.9 kg)   07/09/17 142 lb (64.4 kg)       General:  Lethargic, not in distress, resting comfortablly  Neck:  no carotid bruit  Lungs:  Basilar rales  Heart: No obvious murmur  Abdomen:  abdomen is soft   Extremities:  edema +      Data Review:     Recent Labs      12/01/17   0410   WBC  2.9*   HGB  9.9*   HCT  30.0*   PLT  72*     Recent Labs      12/01/17   0410  11/30/17   0400   NA  137  138   K  4.6  5.1 CL  104  107   CO2  20*  21   GLU  106*  95   BUN  122*  122*   CREA  4.11*  4.02*   CA  8.6  8.8   PHOS  8.4*  8.3*   ALB  3.2*  3.1*  3.1*   SGOT  36   --    ALT  29   --        Results for orders placed or performed during the hospital encounter of 11/28/17   EKG, 12 LEAD, INITIAL   Result Value Ref Range    Ventricular Rate 52 BPM    Atrial Rate 227 BPM    QRS Duration 118 ms    Q-T Interval 490 ms    QTC Calculation (Bezet) 455 ms    Calculated R Axis -38 degrees    Calculated T Axis 26 degrees    Diagnosis       Atrial fibrillation with slow ventricular response  Left axis deviation  Incomplete right bundle branch block  Abnormal ECG  When compared with ECG of 09-JUL-2017 19:42,  QT has lengthened  Confirmed by Marisela Owusu ([id number]) on 11/29/2017 7:54:20 AM         Last Lipid:    Lab Results   Component Value Date/Time    Cholesterol, total 132 07/07/2010 10:57 AM    HDL Cholesterol 65 07/07/2010 10:57 AM    LDL, calculated 55.6 07/07/2010 10:57 AM    Triglyceride 57 07/07/2010 10:57 AM    CHOL/HDL Ratio 2.0 07/07/2010 10:57 AM       Signed By: Trudy Scott MD     December 2, 2017

## 2017-12-02 NOTE — PROGRESS NOTES
Nurse paged - mentioned that Cardiology & Intensivist have not been consulted on this pt   Chart reviewed - Cardiology is already on board & has seen pt & consult in chart. Advised nurse to page Cardiology. Paged Dr Jamaica Eason & tried to explain that the pt is in the ICU 2y to bradycardia & hypotension - he mentions if there is no procedure to be done , he does not need to see the pt & hung up.   Will follow

## 2017-12-02 NOTE — PROGRESS NOTES
1030-Called to bedside due to desaturation of pt - dropping into mid to low '80's  Pt has cpap machine at bedside she is using PRN - went into pt room - several family members at bedside - they refused  bipap therapy - increased Fio2=5 lpm - sats still only holding 84-86% but HR also low - in the '40's - physician has been paged & pt is DNR

## 2017-12-02 NOTE — PROGRESS NOTES
Problem: Falls - Risk of  Goal: *Absence of Falls  Document Cornell Fall Risk and appropriate interventions in the flowsheet.    Fall Risk Interventions:  Mobility Interventions: Assess mobility with egress test, Communicate number of staff needed for ambulation/transfer, OT consult for ADLs, Patient to call before getting OOB, PT Consult for mobility concerns, PT Consult for assist device competence, Strengthening exercises (ROM-active/passive)         Medication Interventions: Assess postural VS orthostatic hypotension, Evaluate medications/consider consulting pharmacy, Patient to call before getting OOB, Teach patient to arise slowly    Elimination Interventions: Call light in reach, Patient to call for help with toileting needs, Toilet paper/wipes in reach, Toileting schedule/hourly rounds

## 2017-12-02 NOTE — ROUTINE PROCESS
1915:  Rec'd Yolanda Camacho  from Heavenly Perez RN. SBAR reviewed with two-nurse check-offs done of meds, dressings, wounds, LDA's & revelant assessment findings. Per Ms. Davies Rito, Cardiology, after atropine x2, start dobutamine   2105:  Dr. Gavin Anaya called, will phone family. 2146:  Family  in. Dopa & dobuta max'd. Daughter states \"No respirator. \"    9655:  Notified Dr. Gavin Anaya of continued decrease in BP with current meds max'd:  Start norepi. D/W family pros, cons of starting norepi with PIV vs using IO for marrow access. Son, daughter at bedside concurred that they wish neither approach at this time and are comfortable withholding additional pressors. 8960: Son, daughter wishing to take photos, Ms. Adrián Barlow verbally consented, too weak to write. 0110:  Per family, \"stronger\" \"IV\" pain medicine requested; per Dr. Gavin Anaya ordered morphine. 0600:  Dr. Gavin Anaya in, know to family. They state agreement on limiting invasive activities and making Ms. Camacho as comfortable as possible. Current pressors, inotropes are at maximum ordered levels and daughters x2 & son concur that adding NE would be more complicated than Ms Adrián Barlow needs right now. 0715:  Verbal Patient-side shift change report given to dunia walton RN, (oncoming nurse) by Rachele Pollock RN  (offgoing nurse). Report included the following information SBAR, Kardex, ED Summary, Procedure Summary, Intake/Output, MAR, Recent Results and Med Rec Status. Included:  Intro, hx, two-RN eval of relevant assessment findings, LDAs, skin, diagnostics and infusions.

## 2017-12-02 NOTE — PROGRESS NOTES
Death Note:  I was paged to see patient for unresponsiveness around 1400. On exam, the patient was unresponsive, no spontaneous movement was observed, pt did not respond to verbal or noxious stimuli. Absent heart and breath sounds for more than 1 minute. Pupils are fixed and dilated, corneal reflex was absent. Patient pronounced dead at 0. Family was at the bedside for pronouncement.     Christian Leung, DO  Internal Medicine, Hospitalist  Pager: [phone] [facility]
